# Patient Record
Sex: FEMALE | Race: WHITE | Employment: UNEMPLOYED
[De-identification: names, ages, dates, MRNs, and addresses within clinical notes are randomized per-mention and may not be internally consistent; named-entity substitution may affect disease eponyms.]

---

## 2017-01-16 ENCOUNTER — TELEPHONE (OUTPATIENT)
Dept: INTERNAL MEDICINE | Facility: CLINIC | Age: 47
End: 2017-01-16

## 2017-01-20 ENCOUNTER — TELEPHONE (OUTPATIENT)
Dept: INTERNAL MEDICINE | Facility: CLINIC | Age: 47
End: 2017-01-20

## 2017-01-20 DIAGNOSIS — R19.7 DIARRHEA, UNSPECIFIED TYPE: Primary | ICD-10-CM

## 2017-01-26 DIAGNOSIS — Z79.4 TYPE 2 DIABETES MELLITUS WITH COMPLICATION, WITH LONG-TERM CURRENT USE OF INSULIN (HCC): ICD-10-CM

## 2017-01-26 DIAGNOSIS — E11.8 TYPE 2 DIABETES MELLITUS WITH COMPLICATION, WITH LONG-TERM CURRENT USE OF INSULIN (HCC): ICD-10-CM

## 2017-01-26 DIAGNOSIS — E78.5 DYSLIPIDEMIA: Chronic | ICD-10-CM

## 2017-01-27 RX ORDER — ASPIRIN 81 MG
TABLET, DELAYED RELEASE (ENTERIC COATED) ORAL
Qty: 30 TABLET | Refills: 0 | Status: SHIPPED | OUTPATIENT
Start: 2017-01-27 | End: 2017-02-23 | Stop reason: SDUPTHER

## 2017-01-27 RX ORDER — LEVOTHYROXINE SODIUM 0.12 MG/1
TABLET ORAL
Qty: 30 TABLET | Refills: 0 | Status: SHIPPED | OUTPATIENT
Start: 2017-01-27 | End: 2017-02-23 | Stop reason: SDUPTHER

## 2017-01-27 RX ORDER — ATORVASTATIN CALCIUM 40 MG/1
TABLET, FILM COATED ORAL
Qty: 30 TABLET | Refills: 0 | Status: SHIPPED | OUTPATIENT
Start: 2017-01-27 | End: 2017-02-23 | Stop reason: SDUPTHER

## 2017-02-08 ENCOUNTER — OFFICE VISIT (OUTPATIENT)
Dept: INTERNAL MEDICINE | Facility: CLINIC | Age: 47
End: 2017-02-08

## 2017-02-08 VITALS
TEMPERATURE: 97.7 F | WEIGHT: 270 LBS | DIASTOLIC BLOOD PRESSURE: 67 MMHG | BODY MASS INDEX: 45.63 KG/M2 | HEART RATE: 78 BPM | SYSTOLIC BLOOD PRESSURE: 120 MMHG | RESPIRATION RATE: 18 BRPM

## 2017-02-08 DIAGNOSIS — Z79.4 TYPE 2 DIABETES MELLITUS WITH COMPLICATION, WITH LONG-TERM CURRENT USE OF INSULIN (HCC): Chronic | ICD-10-CM

## 2017-02-08 DIAGNOSIS — K43.9 VENTRAL HERNIA WITHOUT OBSTRUCTION OR GANGRENE: Primary | ICD-10-CM

## 2017-02-08 DIAGNOSIS — I10 ESSENTIAL HYPERTENSION: Chronic | ICD-10-CM

## 2017-02-08 DIAGNOSIS — E11.8 TYPE 2 DIABETES MELLITUS WITH COMPLICATION, WITH LONG-TERM CURRENT USE OF INSULIN (HCC): Chronic | ICD-10-CM

## 2017-02-08 PROCEDURE — 99213 OFFICE O/P EST LOW 20 MIN: CPT | Performed by: INTERNAL MEDICINE

## 2017-02-08 RX ORDER — CLONAZEPAM 0.5 MG/1
0.5 TABLET ORAL NIGHTLY PRN
COMMUNITY
Start: 2017-02-02 | End: 2018-10-29

## 2017-02-08 RX ORDER — LOPERAMIDE HYDROCHLORIDE 2 MG/1
CAPSULE ORAL
Refills: 0 | COMMUNITY
Start: 2017-01-21 | End: 2017-02-08

## 2017-02-08 RX ORDER — ONDANSETRON 4 MG/1
TABLET, ORALLY DISINTEGRATING ORAL
Refills: 0 | COMMUNITY
Start: 2017-01-21 | End: 2017-05-26 | Stop reason: ALTCHOICE

## 2017-02-08 RX ORDER — DULOXETIN HYDROCHLORIDE 60 MG/1
60 CAPSULE, DELAYED RELEASE ORAL DAILY
COMMUNITY
Start: 2017-01-26 | End: 2019-02-18 | Stop reason: ALTCHOICE

## 2017-02-08 RX ORDER — BIMATOPROST 0.01 %
1 DROPS OPHTHALMIC (EYE) NIGHTLY
COMMUNITY
Start: 2016-12-22 | End: 2022-08-02

## 2017-02-08 ASSESSMENT — ENCOUNTER SYMPTOMS
RESPIRATORY NEGATIVE: 1
EYES NEGATIVE: 1
BACK PAIN: 1
ABDOMINAL PAIN: 1

## 2017-02-09 ENCOUNTER — TELEPHONE (OUTPATIENT)
Dept: INTERNAL MEDICINE | Facility: CLINIC | Age: 47
End: 2017-02-09

## 2017-02-09 DIAGNOSIS — K43.9 VENTRAL HERNIA WITHOUT OBSTRUCTION OR GANGRENE: Primary | ICD-10-CM

## 2017-02-16 ENCOUNTER — TELEPHONE (OUTPATIENT)
Dept: INTERNAL MEDICINE | Facility: CLINIC | Age: 47
End: 2017-02-16

## 2017-02-17 RX ORDER — FLUCONAZOLE 150 MG/1
150 TABLET ORAL DAILY
Qty: 3 TABLET | Refills: 0 | Status: SHIPPED | OUTPATIENT
Start: 2017-02-17 | End: 2017-02-20

## 2017-02-23 DIAGNOSIS — Z79.4 TYPE 2 DIABETES MELLITUS WITH COMPLICATION, WITH LONG-TERM CURRENT USE OF INSULIN (HCC): ICD-10-CM

## 2017-02-23 DIAGNOSIS — E11.8 TYPE 2 DIABETES MELLITUS WITH COMPLICATION, WITH LONG-TERM CURRENT USE OF INSULIN (HCC): ICD-10-CM

## 2017-02-23 DIAGNOSIS — E78.5 DYSLIPIDEMIA: Chronic | ICD-10-CM

## 2017-02-23 RX ORDER — ATORVASTATIN CALCIUM 40 MG/1
TABLET, FILM COATED ORAL
Qty: 30 TABLET | Refills: 0 | Status: SHIPPED | OUTPATIENT
Start: 2017-02-23 | End: 2017-03-06 | Stop reason: SDUPTHER

## 2017-02-23 RX ORDER — LEVOTHYROXINE SODIUM 0.12 MG/1
TABLET ORAL
Qty: 30 TABLET | Refills: 0 | Status: SHIPPED | OUTPATIENT
Start: 2017-02-23 | End: 2017-03-06 | Stop reason: SDUPTHER

## 2017-02-23 RX ORDER — ASPIRIN 81 MG
TABLET, DELAYED RELEASE (ENTERIC COATED) ORAL
Qty: 30 TABLET | Refills: 0 | Status: SHIPPED | OUTPATIENT
Start: 2017-02-23 | End: 2017-03-06 | Stop reason: SDUPTHER

## 2017-03-03 DIAGNOSIS — Z79.4 TYPE 2 DIABETES MELLITUS WITH COMPLICATION, WITH LONG-TERM CURRENT USE OF INSULIN (HCC): ICD-10-CM

## 2017-03-03 DIAGNOSIS — E11.8 TYPE 2 DIABETES MELLITUS WITH COMPLICATION, WITH LONG-TERM CURRENT USE OF INSULIN (HCC): ICD-10-CM

## 2017-03-06 DIAGNOSIS — E08.49 OTHER DIABETIC NEUROLOGICAL COMPLICATION ASSOCIATED WITH DIABETES MELLITUS DUE TO UNDERLYING CONDITION (HCC): ICD-10-CM

## 2017-03-06 DIAGNOSIS — E78.5 DYSLIPIDEMIA: Chronic | ICD-10-CM

## 2017-03-06 DIAGNOSIS — Z79.4 TYPE 2 DIABETES MELLITUS WITH COMPLICATION, WITH LONG-TERM CURRENT USE OF INSULIN (HCC): ICD-10-CM

## 2017-03-06 DIAGNOSIS — E11.8 TYPE 2 DIABETES MELLITUS WITH COMPLICATION, WITH LONG-TERM CURRENT USE OF INSULIN (HCC): ICD-10-CM

## 2017-03-07 RX ORDER — ASPIRIN 81 MG
TABLET, DELAYED RELEASE (ENTERIC COATED) ORAL
Qty: 30 TABLET | Refills: 0 | Status: SHIPPED | OUTPATIENT
Start: 2017-03-07 | End: 2017-04-21 | Stop reason: SDUPTHER

## 2017-03-07 RX ORDER — GABAPENTIN 300 MG/1
CAPSULE ORAL
Qty: 90 CAPSULE | Refills: 0 | Status: SHIPPED | OUTPATIENT
Start: 2017-03-07 | End: 2017-04-21 | Stop reason: SDUPTHER

## 2017-03-07 RX ORDER — LEVOTHYROXINE SODIUM 0.12 MG/1
TABLET ORAL
Qty: 30 TABLET | Refills: 0 | Status: SHIPPED | OUTPATIENT
Start: 2017-03-07 | End: 2017-04-21 | Stop reason: SDUPTHER

## 2017-03-07 RX ORDER — ATORVASTATIN CALCIUM 40 MG/1
TABLET, FILM COATED ORAL
Qty: 30 TABLET | Refills: 0 | Status: SHIPPED | OUTPATIENT
Start: 2017-03-07 | End: 2017-04-21 | Stop reason: SDUPTHER

## 2017-04-03 DIAGNOSIS — Z79.4 TYPE 2 DIABETES MELLITUS WITH COMPLICATION, WITH LONG-TERM CURRENT USE OF INSULIN (HCC): Primary | Chronic | ICD-10-CM

## 2017-04-03 DIAGNOSIS — E11.8 TYPE 2 DIABETES MELLITUS WITH COMPLICATION, WITH LONG-TERM CURRENT USE OF INSULIN (HCC): Primary | Chronic | ICD-10-CM

## 2017-04-04 ENCOUNTER — HOSPITAL ENCOUNTER (OUTPATIENT)
Dept: PREADMISSION TESTING | Age: 47
Discharge: HOME | End: 2017-04-04
Payer: COMMERCIAL

## 2017-04-04 ENCOUNTER — HOSPITAL ENCOUNTER (OUTPATIENT)
Dept: GENERAL RADIOLOGY | Age: 47
Discharge: HOME | End: 2017-04-04
Payer: COMMERCIAL

## 2017-04-04 VITALS
HEART RATE: 90 BPM | RESPIRATION RATE: 18 BRPM | SYSTOLIC BLOOD PRESSURE: 147 MMHG | BODY MASS INDEX: 43.62 KG/M2 | WEIGHT: 271.39 LBS | DIASTOLIC BLOOD PRESSURE: 88 MMHG | OXYGEN SATURATION: 95 % | HEIGHT: 66 IN

## 2017-04-04 LAB
ABSOLUTE EOS #: 0.3 K/UL (ref 0–0.4)
ABSOLUTE LYMPH #: 3.5 K/UL (ref 1–4.8)
ABSOLUTE MONO #: 0.4 K/UL (ref 0.2–0.8)
ANION GAP SERPL CALCULATED.3IONS-SCNC: 16 MMOL/L (ref 9–17)
BASOPHILS # BLD: 1 % (ref 0–2)
BASOPHILS ABSOLUTE: 0 K/UL (ref 0–0.2)
BUN BLDV-MCNC: 12 MG/DL (ref 6–20)
BUN/CREAT BLD: 17 (ref 9–20)
CALCIUM SERPL-MCNC: 8.7 MG/DL (ref 8.6–10.4)
CHLORIDE BLD-SCNC: 86 MMOL/L (ref 98–107)
CO2: 26 MMOL/L (ref 20–31)
CREAT SERPL-MCNC: 0.72 MG/DL (ref 0.5–0.9)
DIFFERENTIAL TYPE: ABNORMAL
EOSINOPHILS RELATIVE PERCENT: 3 % (ref 1–4)
GFR AFRICAN AMERICAN: >60 ML/MIN
GFR NON-AFRICAN AMERICAN: >60 ML/MIN
GFR SERPL CREATININE-BSD FRML MDRD: ABNORMAL ML/MIN/{1.73_M2}
GFR SERPL CREATININE-BSD FRML MDRD: ABNORMAL ML/MIN/{1.73_M2}
GLUCOSE BLD-MCNC: 515 MG/DL (ref 70–99)
HCT VFR BLD CALC: 38.6 % (ref 36–46)
HEMOGLOBIN: 12.7 G/DL (ref 12–16)
LYMPHOCYTES # BLD: 36 % (ref 24–44)
MCH RBC QN AUTO: 25.5 PG (ref 26–34)
MCHC RBC AUTO-ENTMCNC: 32.8 G/DL (ref 31–37)
MCV RBC AUTO: 77.7 FL (ref 80–100)
MONOCYTES # BLD: 4 % (ref 1–7)
PDW BLD-RTO: 15.4 % (ref 11.5–14.5)
PLATELET # BLD: 343 K/UL (ref 130–400)
PLATELET ESTIMATE: ABNORMAL
PMV BLD AUTO: 8.6 FL (ref 6–12)
POTASSIUM SERPL-SCNC: 4.6 MMOL/L (ref 3.7–5.3)
RBC # BLD: 4.97 M/UL (ref 4–5.2)
RBC # BLD: ABNORMAL 10*6/UL
SEG NEUTROPHILS: 56 % (ref 36–66)
SEGMENTED NEUTROPHILS ABSOLUTE COUNT: 5.4 K/UL (ref 1.8–7.7)
SODIUM BLD-SCNC: 128 MMOL/L (ref 135–144)
WBC # BLD: 9.7 K/UL (ref 3.5–11)
WBC # BLD: ABNORMAL 10*3/UL

## 2017-04-04 PROCEDURE — 80048 BASIC METABOLIC PNL TOTAL CA: CPT

## 2017-04-04 PROCEDURE — 85025 COMPLETE CBC W/AUTO DIFF WBC: CPT

## 2017-04-04 PROCEDURE — 71020 XR CHEST STANDARD TWO VW: CPT

## 2017-04-04 PROCEDURE — 36415 COLL VENOUS BLD VENIPUNCTURE: CPT

## 2017-04-04 PROCEDURE — 93005 ELECTROCARDIOGRAM TRACING: CPT

## 2017-04-05 ENCOUNTER — OFFICE VISIT (OUTPATIENT)
Dept: INTERNAL MEDICINE | Age: 47
End: 2017-04-05
Payer: COMMERCIAL

## 2017-04-05 VITALS
HEIGHT: 65 IN | WEIGHT: 275.4 LBS | BODY MASS INDEX: 45.88 KG/M2 | SYSTOLIC BLOOD PRESSURE: 105 MMHG | DIASTOLIC BLOOD PRESSURE: 68 MMHG | HEART RATE: 85 BPM

## 2017-04-05 DIAGNOSIS — I10 ESSENTIAL HYPERTENSION: Chronic | ICD-10-CM

## 2017-04-05 DIAGNOSIS — E11.8 TYPE 2 DIABETES MELLITUS WITH COMPLICATION, WITH LONG-TERM CURRENT USE OF INSULIN (HCC): Primary | Chronic | ICD-10-CM

## 2017-04-05 DIAGNOSIS — Z79.4 TYPE 2 DIABETES MELLITUS WITH COMPLICATION, WITH LONG-TERM CURRENT USE OF INSULIN (HCC): Primary | Chronic | ICD-10-CM

## 2017-04-05 LAB
EKG ATRIAL RATE: 83 BPM
EKG P AXIS: 40 DEGREES
EKG P-R INTERVAL: 172 MS
EKG Q-T INTERVAL: 382 MS
EKG QRS DURATION: 68 MS
EKG QTC CALCULATION (BAZETT): 448 MS
EKG R AXIS: 8 DEGREES
EKG T AXIS: 76 DEGREES
EKG VENTRICULAR RATE: 83 BPM
GLUCOSE BLD-MCNC: 315 MG/DL
HBA1C MFR BLD: 13 %

## 2017-04-05 PROCEDURE — 83036 HEMOGLOBIN GLYCOSYLATED A1C: CPT | Performed by: INTERNAL MEDICINE

## 2017-04-05 PROCEDURE — 99214 OFFICE O/P EST MOD 30 MIN: CPT | Performed by: INTERNAL MEDICINE

## 2017-04-05 PROCEDURE — 82962 GLUCOSE BLOOD TEST: CPT | Performed by: INTERNAL MEDICINE

## 2017-04-05 RX ORDER — TEMAZEPAM 15 MG/1
15 CAPSULE ORAL NIGHTLY PRN
Qty: 15 CAPSULE | Refills: 0 | Status: SHIPPED | OUTPATIENT
Start: 2017-04-05 | End: 2018-10-29

## 2017-04-05 RX ORDER — CYCLOBENZAPRINE HCL 5 MG
5 TABLET ORAL 2 TIMES DAILY PRN
Qty: 60 TABLET | Refills: 2 | Status: SHIPPED | OUTPATIENT
Start: 2017-04-05 | End: 2017-07-05 | Stop reason: SDUPTHER

## 2017-04-05 ASSESSMENT — ENCOUNTER SYMPTOMS
EYES NEGATIVE: 1
RESPIRATORY NEGATIVE: 1
ALLERGIC/IMMUNOLOGIC NEGATIVE: 1
GASTROINTESTINAL NEGATIVE: 1

## 2017-04-06 ENCOUNTER — TELEPHONE (OUTPATIENT)
Dept: INTERNAL MEDICINE | Age: 47
End: 2017-04-06

## 2017-04-12 ENCOUNTER — OFFICE VISIT (OUTPATIENT)
Dept: INTERNAL MEDICINE | Age: 47
End: 2017-04-12
Payer: COMMERCIAL

## 2017-04-12 VITALS
WEIGHT: 270 LBS | HEIGHT: 64 IN | BODY MASS INDEX: 46.1 KG/M2 | SYSTOLIC BLOOD PRESSURE: 104 MMHG | DIASTOLIC BLOOD PRESSURE: 68 MMHG | HEART RATE: 77 BPM

## 2017-04-12 DIAGNOSIS — E11.8 TYPE 2 DIABETES MELLITUS WITH COMPLICATION, WITH LONG-TERM CURRENT USE OF INSULIN (HCC): Primary | Chronic | ICD-10-CM

## 2017-04-12 DIAGNOSIS — Z01.818 PREOPERATIVE CLEARANCE: ICD-10-CM

## 2017-04-12 DIAGNOSIS — Z79.4 TYPE 2 DIABETES MELLITUS WITH COMPLICATION, WITH LONG-TERM CURRENT USE OF INSULIN (HCC): Primary | Chronic | ICD-10-CM

## 2017-04-12 LAB — GLUCOSE BLD-MCNC: 163 MG/DL

## 2017-04-12 PROCEDURE — 99213 OFFICE O/P EST LOW 20 MIN: CPT | Performed by: INTERNAL MEDICINE

## 2017-04-12 PROCEDURE — 82962 GLUCOSE BLOOD TEST: CPT | Performed by: INTERNAL MEDICINE

## 2017-04-15 DIAGNOSIS — J41.0 SIMPLE CHRONIC BRONCHITIS (HCC): ICD-10-CM

## 2017-04-17 ENCOUNTER — TELEPHONE (OUTPATIENT)
Dept: INTERNAL MEDICINE | Age: 47
End: 2017-04-17

## 2017-04-17 RX ORDER — FLUCONAZOLE 150 MG/1
150 TABLET ORAL DAILY
Qty: 3 TABLET | Refills: 0 | Status: SHIPPED | OUTPATIENT
Start: 2017-04-17 | End: 2017-04-20

## 2017-04-17 RX ORDER — TIOTROPIUM BROMIDE 18 UG/1
CAPSULE ORAL; RESPIRATORY (INHALATION)
Qty: 30 CAPSULE | Refills: 0 | Status: SHIPPED | OUTPATIENT
Start: 2017-04-17 | End: 2017-04-28 | Stop reason: SDUPTHER

## 2017-04-17 RX ORDER — KETOCONAZOLE 20 MG/G
CREAM TOPICAL
Qty: 30 G | Refills: 1 | Status: SHIPPED | OUTPATIENT
Start: 2017-04-17 | End: 2018-08-20

## 2017-04-18 ENCOUNTER — HOSPITAL ENCOUNTER (OUTPATIENT)
Age: 47
Setting detail: OUTPATIENT SURGERY
Discharge: HOME | End: 2017-04-18
Attending: SURGERY | Admitting: SURGERY
Payer: COMMERCIAL

## 2017-04-18 VITALS
TEMPERATURE: 98 F | DIASTOLIC BLOOD PRESSURE: 83 MMHG | RESPIRATION RATE: 16 BRPM | HEIGHT: 66 IN | HEART RATE: 73 BPM | OXYGEN SATURATION: 94 % | BODY MASS INDEX: 43.54 KG/M2 | SYSTOLIC BLOOD PRESSURE: 145 MMHG | WEIGHT: 270.9 LBS

## 2017-04-18 LAB — HCG, PREGNANCY URINE (POC): NEGATIVE

## 2017-04-18 PROCEDURE — 84703 CHORIONIC GONADOTROPIN ASSAY: CPT

## 2017-04-18 RX ORDER — SODIUM CHLORIDE, SODIUM LACTATE, POTASSIUM CHLORIDE, CALCIUM CHLORIDE 600; 310; 30; 20 MG/100ML; MG/100ML; MG/100ML; MG/100ML
INJECTION, SOLUTION INTRAVENOUS CONTINUOUS
Status: DISCONTINUED | OUTPATIENT
Start: 2017-04-19 | End: 2017-04-18 | Stop reason: HOSPADM

## 2017-04-18 RX ORDER — LIDOCAINE HYDROCHLORIDE 10 MG/ML
1 INJECTION, SOLUTION EPIDURAL; INFILTRATION; INTRACAUDAL; PERINEURAL
Status: DISCONTINUED | OUTPATIENT
Start: 2017-04-19 | End: 2017-04-18 | Stop reason: HOSPADM

## 2017-04-18 RX ORDER — SODIUM CHLORIDE 9 MG/ML
INJECTION, SOLUTION INTRAVENOUS CONTINUOUS
Status: DISCONTINUED | OUTPATIENT
Start: 2017-04-19 | End: 2017-04-18

## 2017-04-18 RX ORDER — SODIUM CHLORIDE 0.9 % (FLUSH) 0.9 %
10 SYRINGE (ML) INJECTION PRN
Status: DISCONTINUED | OUTPATIENT
Start: 2017-04-18 | End: 2017-04-18 | Stop reason: HOSPADM

## 2017-04-18 RX ORDER — SODIUM CHLORIDE 0.9 % (FLUSH) 0.9 %
10 SYRINGE (ML) INJECTION EVERY 12 HOURS SCHEDULED
Status: DISCONTINUED | OUTPATIENT
Start: 2017-04-18 | End: 2017-04-18 | Stop reason: HOSPADM

## 2017-04-18 ASSESSMENT — PAIN - FUNCTIONAL ASSESSMENT: PAIN_FUNCTIONAL_ASSESSMENT: 0-10

## 2017-04-21 DIAGNOSIS — I10 ESSENTIAL HYPERTENSION: Chronic | ICD-10-CM

## 2017-04-21 DIAGNOSIS — E78.5 DYSLIPIDEMIA: Chronic | ICD-10-CM

## 2017-04-21 DIAGNOSIS — E11.8 TYPE 2 DIABETES MELLITUS WITH COMPLICATION, WITH LONG-TERM CURRENT USE OF INSULIN (HCC): ICD-10-CM

## 2017-04-21 DIAGNOSIS — E08.49 OTHER DIABETIC NEUROLOGICAL COMPLICATION ASSOCIATED WITH DIABETES MELLITUS DUE TO UNDERLYING CONDITION (HCC): ICD-10-CM

## 2017-04-21 DIAGNOSIS — Z79.4 TYPE 2 DIABETES MELLITUS WITH COMPLICATION, WITH LONG-TERM CURRENT USE OF INSULIN (HCC): ICD-10-CM

## 2017-04-22 RX ORDER — ASPIRIN 81 MG
TABLET, DELAYED RELEASE (ENTERIC COATED) ORAL
Qty: 30 TABLET | Refills: 0 | Status: SHIPPED | OUTPATIENT
Start: 2017-04-22 | End: 2017-05-17 | Stop reason: SDUPTHER

## 2017-04-22 RX ORDER — LEVOTHYROXINE SODIUM 0.12 MG/1
TABLET ORAL
Qty: 30 TABLET | Refills: 0 | Status: SHIPPED | OUTPATIENT
Start: 2017-04-22 | End: 2017-05-17 | Stop reason: SDUPTHER

## 2017-04-22 RX ORDER — ATORVASTATIN CALCIUM 40 MG/1
TABLET, FILM COATED ORAL
Qty: 30 TABLET | Refills: 0 | Status: SHIPPED | OUTPATIENT
Start: 2017-04-22 | End: 2017-05-17 | Stop reason: SDUPTHER

## 2017-04-22 RX ORDER — GABAPENTIN 300 MG/1
CAPSULE ORAL
Qty: 90 CAPSULE | Refills: 0 | Status: SHIPPED | OUTPATIENT
Start: 2017-04-22 | End: 2017-05-17 | Stop reason: SDUPTHER

## 2017-04-24 RX ORDER — ATENOLOL 50 MG/1
TABLET ORAL
Qty: 30 TABLET | Refills: 0 | Status: SHIPPED | OUTPATIENT
Start: 2017-04-24 | End: 2017-05-17 | Stop reason: SDUPTHER

## 2017-04-28 DIAGNOSIS — J41.0 SIMPLE CHRONIC BRONCHITIS (HCC): ICD-10-CM

## 2017-04-28 RX ORDER — TIOTROPIUM BROMIDE 18 UG/1
CAPSULE ORAL; RESPIRATORY (INHALATION)
Qty: 30 CAPSULE | Refills: 0 | Status: SHIPPED | OUTPATIENT
Start: 2017-04-28 | End: 2017-07-03 | Stop reason: SDUPTHER

## 2017-05-01 ENCOUNTER — ANESTHESIA EVENT (OUTPATIENT)
Dept: OPERATING ROOM | Age: 47
End: 2017-05-01
Payer: COMMERCIAL

## 2017-05-01 RX ORDER — SODIUM CHLORIDE 9 MG/ML
INJECTION, SOLUTION INTRAVENOUS CONTINUOUS
Status: CANCELLED | OUTPATIENT
Start: 2017-05-01

## 2017-05-01 RX ORDER — LIDOCAINE HYDROCHLORIDE 10 MG/ML
1 INJECTION, SOLUTION EPIDURAL; INFILTRATION; INTRACAUDAL; PERINEURAL
Status: CANCELLED | OUTPATIENT
Start: 2017-05-01 | End: 2017-05-01

## 2017-05-01 RX ORDER — SODIUM CHLORIDE 0.9 % (FLUSH) 0.9 %
10 SYRINGE (ML) INJECTION EVERY 12 HOURS SCHEDULED
Status: CANCELLED | OUTPATIENT
Start: 2017-05-01

## 2017-05-01 RX ORDER — SODIUM CHLORIDE, SODIUM LACTATE, POTASSIUM CHLORIDE, CALCIUM CHLORIDE 600; 310; 30; 20 MG/100ML; MG/100ML; MG/100ML; MG/100ML
INJECTION, SOLUTION INTRAVENOUS CONTINUOUS
Status: CANCELLED | OUTPATIENT
Start: 2017-05-01

## 2017-05-01 RX ORDER — SODIUM CHLORIDE 0.9 % (FLUSH) 0.9 %
10 SYRINGE (ML) INJECTION PRN
Status: CANCELLED | OUTPATIENT
Start: 2017-05-01

## 2017-05-02 ENCOUNTER — TELEPHONE (OUTPATIENT)
Dept: INTERNAL MEDICINE | Age: 47
End: 2017-05-02

## 2017-05-02 ENCOUNTER — HOSPITAL ENCOUNTER (OUTPATIENT)
Age: 47
Setting detail: OBSERVATION
Discharge: HOME | End: 2017-05-03
Attending: SURGERY | Admitting: SURGERY
Payer: COMMERCIAL

## 2017-05-02 ENCOUNTER — ANESTHESIA (OUTPATIENT)
Dept: OPERATING ROOM | Age: 47
End: 2017-05-02
Payer: COMMERCIAL

## 2017-05-02 VITALS — SYSTOLIC BLOOD PRESSURE: 122 MMHG | TEMPERATURE: 97.9 F | DIASTOLIC BLOOD PRESSURE: 61 MMHG | OXYGEN SATURATION: 92 %

## 2017-05-02 LAB
CREAT SERPL-MCNC: 0.6 MG/DL (ref 0.5–0.9)
GFR AFRICAN AMERICAN: >60 ML/MIN
GFR NON-AFRICAN AMERICAN: >60 ML/MIN
GFR SERPL CREATININE-BSD FRML MDRD: NORMAL ML/MIN/{1.73_M2}
GFR SERPL CREATININE-BSD FRML MDRD: NORMAL ML/MIN/{1.73_M2}
GLUCOSE BLD-MCNC: 203 MG/DL (ref 65–105)
GLUCOSE BLD-MCNC: 214 MG/DL (ref 65–105)
GLUCOSE BLD-MCNC: 335 MG/DL (ref 65–105)
HCG, PREGNANCY URINE (POC): NEGATIVE

## 2017-05-02 PROCEDURE — G0378 HOSPITAL OBSERVATION PER HR: HCPCS

## 2017-05-02 PROCEDURE — S2900 ROBOTIC SURGICAL SYSTEM: HCPCS | Performed by: SURGERY

## 2017-05-02 PROCEDURE — 3600000009 HC SURGERY ROBOT BASE: Performed by: SURGERY

## 2017-05-02 PROCEDURE — 6360000002 HC RX W HCPCS: Performed by: ANESTHESIOLOGY

## 2017-05-02 PROCEDURE — 82565 ASSAY OF CREATININE: CPT

## 2017-05-02 PROCEDURE — 2500000003 HC RX 250 WO HCPCS: Performed by: NURSE ANESTHETIST, CERTIFIED REGISTERED

## 2017-05-02 PROCEDURE — 6370000000 HC RX 637 (ALT 250 FOR IP): Performed by: ANESTHESIOLOGY

## 2017-05-02 PROCEDURE — 2500000003 HC RX 250 WO HCPCS: Performed by: SURGERY

## 2017-05-02 PROCEDURE — 3700000001 HC ADD 15 MINUTES (ANESTHESIA): Performed by: SURGERY

## 2017-05-02 PROCEDURE — 7100000000 HC PACU RECOVERY - FIRST 15 MIN: Performed by: SURGERY

## 2017-05-02 PROCEDURE — 2580000003 HC RX 258: Performed by: ANESTHESIOLOGY

## 2017-05-02 PROCEDURE — 6360000002 HC RX W HCPCS: Performed by: SURGERY

## 2017-05-02 PROCEDURE — 6360000002 HC RX W HCPCS: Performed by: NURSE ANESTHETIST, CERTIFIED REGISTERED

## 2017-05-02 PROCEDURE — 6370000000 HC RX 637 (ALT 250 FOR IP): Performed by: SURGERY

## 2017-05-02 PROCEDURE — 82947 ASSAY GLUCOSE BLOOD QUANT: CPT

## 2017-05-02 PROCEDURE — 2500000003 HC RX 250 WO HCPCS: Performed by: ANESTHESIOLOGY

## 2017-05-02 PROCEDURE — C1760 CLOSURE DEV, VASC: HCPCS | Performed by: SURGERY

## 2017-05-02 PROCEDURE — 84703 CHORIONIC GONADOTROPIN ASSAY: CPT

## 2017-05-02 PROCEDURE — 2580000003 HC RX 258: Performed by: SURGERY

## 2017-05-02 PROCEDURE — 7100000001 HC PACU RECOVERY - ADDTL 15 MIN: Performed by: SURGERY

## 2017-05-02 PROCEDURE — 3600000019 HC SURGERY ROBOT ADDTL 15MIN: Performed by: SURGERY

## 2017-05-02 PROCEDURE — 36415 COLL VENOUS BLD VENIPUNCTURE: CPT

## 2017-05-02 PROCEDURE — 3700000000 HC ANESTHESIA ATTENDED CARE: Performed by: SURGERY

## 2017-05-02 RX ORDER — DEXAMETHASONE SODIUM PHOSPHATE 10 MG/ML
INJECTION INTRAMUSCULAR; INTRAVENOUS PRN
Status: DISCONTINUED | OUTPATIENT
Start: 2017-05-02 | End: 2017-05-02 | Stop reason: SDUPTHER

## 2017-05-02 RX ORDER — SODIUM CHLORIDE 0.9 % (FLUSH) 0.9 %
10 SYRINGE (ML) INJECTION PRN
Status: DISCONTINUED | OUTPATIENT
Start: 2017-05-02 | End: 2017-05-02 | Stop reason: HOSPADM

## 2017-05-02 RX ORDER — LABETALOL HYDROCHLORIDE 5 MG/ML
5 INJECTION, SOLUTION INTRAVENOUS EVERY 10 MIN PRN
Status: DISCONTINUED | OUTPATIENT
Start: 2017-05-02 | End: 2017-05-02 | Stop reason: HOSPADM

## 2017-05-02 RX ORDER — FENTANYL CITRATE 50 UG/ML
25 INJECTION, SOLUTION INTRAMUSCULAR; INTRAVENOUS EVERY 5 MIN PRN
Status: DISCONTINUED | OUTPATIENT
Start: 2017-05-02 | End: 2017-05-02 | Stop reason: HOSPADM

## 2017-05-02 RX ORDER — OXYCODONE HYDROCHLORIDE AND ACETAMINOPHEN 5; 325 MG/1; MG/1
1 TABLET ORAL EVERY 4 HOURS PRN
Status: DISCONTINUED | OUTPATIENT
Start: 2017-05-02 | End: 2017-05-03 | Stop reason: HOSPADM

## 2017-05-02 RX ORDER — NITROGLYCERIN 0.4 MG/1
0.4 TABLET SUBLINGUAL EVERY 5 MIN PRN
Status: DISCONTINUED | OUTPATIENT
Start: 2017-05-02 | End: 2017-05-03 | Stop reason: HOSPADM

## 2017-05-02 RX ORDER — ONDANSETRON 4 MG/1
4 TABLET, ORALLY DISINTEGRATING ORAL EVERY 8 HOURS PRN
Status: DISCONTINUED | OUTPATIENT
Start: 2017-05-02 | End: 2017-05-03 | Stop reason: HOSPADM

## 2017-05-02 RX ORDER — SODIUM CHLORIDE 0.9 % (FLUSH) 0.9 %
10 SYRINGE (ML) INJECTION EVERY 12 HOURS SCHEDULED
Status: DISCONTINUED | OUTPATIENT
Start: 2017-05-02 | End: 2017-05-03 | Stop reason: HOSPADM

## 2017-05-02 RX ORDER — FENTANYL CITRATE 50 UG/ML
INJECTION, SOLUTION INTRAMUSCULAR; INTRAVENOUS PRN
Status: DISCONTINUED | OUTPATIENT
Start: 2017-05-02 | End: 2017-05-02 | Stop reason: SDUPTHER

## 2017-05-02 RX ORDER — POTASSIUM CHLORIDE 750 MG/1
10 CAPSULE, EXTENDED RELEASE ORAL DAILY
Status: DISCONTINUED | OUTPATIENT
Start: 2017-05-03 | End: 2017-05-03 | Stop reason: HOSPADM

## 2017-05-02 RX ORDER — ACETAMINOPHEN 325 MG/1
650 TABLET ORAL EVERY 4 HOURS PRN
Status: DISCONTINUED | OUTPATIENT
Start: 2017-05-02 | End: 2017-05-03 | Stop reason: HOSPADM

## 2017-05-02 RX ORDER — LIDOCAINE HYDROCHLORIDE 10 MG/ML
1 INJECTION, SOLUTION EPIDURAL; INFILTRATION; INTRACAUDAL; PERINEURAL
Status: DISCONTINUED | OUTPATIENT
Start: 2017-05-02 | End: 2017-05-02 | Stop reason: HOSPADM

## 2017-05-02 RX ORDER — NICOTINE POLACRILEX 4 MG
15 LOZENGE BUCCAL PRN
Status: DISCONTINUED | OUTPATIENT
Start: 2017-05-02 | End: 2017-05-03 | Stop reason: HOSPADM

## 2017-05-02 RX ORDER — GLYCOPYRROLATE 0.2 MG/ML
INJECTION INTRAMUSCULAR; INTRAVENOUS PRN
Status: DISCONTINUED | OUTPATIENT
Start: 2017-05-02 | End: 2017-05-02 | Stop reason: SDUPTHER

## 2017-05-02 RX ORDER — MIDAZOLAM HYDROCHLORIDE 1 MG/ML
INJECTION INTRAMUSCULAR; INTRAVENOUS PRN
Status: DISCONTINUED | OUTPATIENT
Start: 2017-05-02 | End: 2017-05-02 | Stop reason: SDUPTHER

## 2017-05-02 RX ORDER — ASPIRIN 81 MG/1
81 TABLET ORAL DAILY
Status: DISCONTINUED | OUTPATIENT
Start: 2017-05-03 | End: 2017-05-03 | Stop reason: HOSPADM

## 2017-05-02 RX ORDER — PANTOPRAZOLE SODIUM 40 MG/1
40 TABLET, DELAYED RELEASE ORAL
Status: DISCONTINUED | OUTPATIENT
Start: 2017-05-03 | End: 2017-05-03 | Stop reason: HOSPADM

## 2017-05-02 RX ORDER — CYCLOBENZAPRINE HCL 5 MG
5 TABLET ORAL 2 TIMES DAILY PRN
Status: DISCONTINUED | OUTPATIENT
Start: 2017-05-02 | End: 2017-05-03 | Stop reason: HOSPADM

## 2017-05-02 RX ORDER — PROPOFOL 10 MG/ML
INJECTION, EMULSION INTRAVENOUS PRN
Status: DISCONTINUED | OUTPATIENT
Start: 2017-05-02 | End: 2017-05-02 | Stop reason: SDUPTHER

## 2017-05-02 RX ORDER — ONDANSETRON 2 MG/ML
4 INJECTION INTRAMUSCULAR; INTRAVENOUS
Status: DISCONTINUED | OUTPATIENT
Start: 2017-05-02 | End: 2017-05-02 | Stop reason: HOSPADM

## 2017-05-02 RX ORDER — ALBUTEROL SULFATE 2.5 MG/3ML
2.5 SOLUTION RESPIRATORY (INHALATION) EVERY 6 HOURS PRN
Status: DISCONTINUED | OUTPATIENT
Start: 2017-05-02 | End: 2017-05-03 | Stop reason: HOSPADM

## 2017-05-02 RX ORDER — ATENOLOL 50 MG/1
1 TABLET ORAL DAILY
Status: DISCONTINUED | OUTPATIENT
Start: 2017-05-03 | End: 2017-05-02 | Stop reason: SDUPTHER

## 2017-05-02 RX ORDER — ONDANSETRON 2 MG/ML
4 INJECTION INTRAMUSCULAR; INTRAVENOUS EVERY 6 HOURS PRN
Status: DISCONTINUED | OUTPATIENT
Start: 2017-05-02 | End: 2017-05-03 | Stop reason: HOSPADM

## 2017-05-02 RX ORDER — DEXTROSE MONOHYDRATE 25 G/50ML
12.5 INJECTION, SOLUTION INTRAVENOUS PRN
Status: DISCONTINUED | OUTPATIENT
Start: 2017-05-02 | End: 2017-05-03 | Stop reason: HOSPADM

## 2017-05-02 RX ORDER — FENTANYL CITRATE 50 UG/ML
25 INJECTION, SOLUTION INTRAMUSCULAR; INTRAVENOUS EVERY 5 MIN PRN
Status: COMPLETED | OUTPATIENT
Start: 2017-05-02 | End: 2017-05-02

## 2017-05-02 RX ORDER — HYDRALAZINE HYDROCHLORIDE 20 MG/ML
5 INJECTION INTRAMUSCULAR; INTRAVENOUS EVERY 10 MIN PRN
Status: DISCONTINUED | OUTPATIENT
Start: 2017-05-02 | End: 2017-05-02 | Stop reason: HOSPADM

## 2017-05-02 RX ORDER — BUDESONIDE AND FORMOTEROL FUMARATE DIHYDRATE 160; 4.5 UG/1; UG/1
2 AEROSOL RESPIRATORY (INHALATION) DAILY
Status: DISCONTINUED | OUTPATIENT
Start: 2017-05-03 | End: 2017-05-03 | Stop reason: HOSPADM

## 2017-05-02 RX ORDER — ATORVASTATIN CALCIUM 40 MG/1
40 TABLET, FILM COATED ORAL DAILY
Status: DISCONTINUED | OUTPATIENT
Start: 2017-05-03 | End: 2017-05-03 | Stop reason: HOSPADM

## 2017-05-02 RX ORDER — SERTRALINE HYDROCHLORIDE 100 MG/1
100 TABLET, FILM COATED ORAL NIGHTLY
Status: DISCONTINUED | OUTPATIENT
Start: 2017-05-02 | End: 2017-05-03 | Stop reason: HOSPADM

## 2017-05-02 RX ORDER — GABAPENTIN 300 MG/1
300 CAPSULE ORAL 3 TIMES DAILY
Status: DISCONTINUED | OUTPATIENT
Start: 2017-05-02 | End: 2017-05-03 | Stop reason: HOSPADM

## 2017-05-02 RX ORDER — LIDOCAINE HYDROCHLORIDE 20 MG/ML
INJECTION, SOLUTION EPIDURAL; INFILTRATION; INTRACAUDAL; PERINEURAL PRN
Status: DISCONTINUED | OUTPATIENT
Start: 2017-05-02 | End: 2017-05-02 | Stop reason: SDUPTHER

## 2017-05-02 RX ORDER — SODIUM CHLORIDE 0.9 % (FLUSH) 0.9 %
10 SYRINGE (ML) INJECTION PRN
Status: DISCONTINUED | OUTPATIENT
Start: 2017-05-02 | End: 2017-05-03 | Stop reason: HOSPADM

## 2017-05-02 RX ORDER — PROMETHAZINE HYDROCHLORIDE 25 MG/ML
6.25 INJECTION, SOLUTION INTRAMUSCULAR; INTRAVENOUS
Status: DISCONTINUED | OUTPATIENT
Start: 2017-05-02 | End: 2017-05-02 | Stop reason: HOSPADM

## 2017-05-02 RX ORDER — DEXTROSE MONOHYDRATE 50 MG/ML
100 INJECTION, SOLUTION INTRAVENOUS PRN
Status: DISCONTINUED | OUTPATIENT
Start: 2017-05-02 | End: 2017-05-03 | Stop reason: HOSPADM

## 2017-05-02 RX ORDER — MELOXICAM 7.5 MG/1
15 TABLET ORAL DAILY
Status: DISCONTINUED | OUTPATIENT
Start: 2017-05-03 | End: 2017-05-03 | Stop reason: HOSPADM

## 2017-05-02 RX ORDER — SODIUM CHLORIDE, SODIUM LACTATE, POTASSIUM CHLORIDE, CALCIUM CHLORIDE 600; 310; 30; 20 MG/100ML; MG/100ML; MG/100ML; MG/100ML
INJECTION, SOLUTION INTRAVENOUS CONTINUOUS
Status: DISCONTINUED | OUTPATIENT
Start: 2017-05-02 | End: 2017-05-02

## 2017-05-02 RX ORDER — FUROSEMIDE 20 MG/1
20 TABLET ORAL DAILY
Status: DISCONTINUED | OUTPATIENT
Start: 2017-05-03 | End: 2017-05-03 | Stop reason: HOSPADM

## 2017-05-02 RX ORDER — BUPIVACAINE HYDROCHLORIDE AND EPINEPHRINE 5; 5 MG/ML; UG/ML
INJECTION, SOLUTION EPIDURAL; INTRACAUDAL; PERINEURAL PRN
Status: DISCONTINUED | OUTPATIENT
Start: 2017-05-02 | End: 2017-05-02 | Stop reason: HOSPADM

## 2017-05-02 RX ORDER — ONDANSETRON 2 MG/ML
INJECTION INTRAMUSCULAR; INTRAVENOUS PRN
Status: DISCONTINUED | OUTPATIENT
Start: 2017-05-02 | End: 2017-05-02 | Stop reason: SDUPTHER

## 2017-05-02 RX ORDER — LIDOCAINE HYDROCHLORIDE AND EPINEPHRINE 10; 10 MG/ML; UG/ML
INJECTION, SOLUTION INFILTRATION; PERINEURAL PRN
Status: DISCONTINUED | OUTPATIENT
Start: 2017-05-02 | End: 2017-05-02 | Stop reason: HOSPADM

## 2017-05-02 RX ORDER — TEMAZEPAM 15 MG/1
15 CAPSULE ORAL NIGHTLY PRN
Status: DISCONTINUED | OUTPATIENT
Start: 2017-05-02 | End: 2017-05-03 | Stop reason: HOSPADM

## 2017-05-02 RX ORDER — TRAZODONE HYDROCHLORIDE 100 MG/1
100 TABLET ORAL NIGHTLY
Status: DISCONTINUED | OUTPATIENT
Start: 2017-05-02 | End: 2017-05-03 | Stop reason: HOSPADM

## 2017-05-02 RX ORDER — OXYCODONE HYDROCHLORIDE AND ACETAMINOPHEN 5; 325 MG/1; MG/1
2 TABLET ORAL EVERY 4 HOURS PRN
Status: DISCONTINUED | OUTPATIENT
Start: 2017-05-02 | End: 2017-05-03 | Stop reason: HOSPADM

## 2017-05-02 RX ORDER — ROCURONIUM BROMIDE 10 MG/ML
INJECTION, SOLUTION INTRAVENOUS PRN
Status: DISCONTINUED | OUTPATIENT
Start: 2017-05-02 | End: 2017-05-02 | Stop reason: SDUPTHER

## 2017-05-02 RX ORDER — ATENOLOL 50 MG/1
50 TABLET ORAL DAILY
Status: DISCONTINUED | OUTPATIENT
Start: 2017-05-03 | End: 2017-05-03 | Stop reason: HOSPADM

## 2017-05-02 RX ORDER — LISINOPRIL 10 MG/1
30 TABLET ORAL DAILY
Status: DISCONTINUED | OUTPATIENT
Start: 2017-05-03 | End: 2017-05-03 | Stop reason: HOSPADM

## 2017-05-02 RX ORDER — OLANZAPINE 5 MG/1
10 TABLET ORAL NIGHTLY
Status: DISCONTINUED | OUTPATIENT
Start: 2017-05-02 | End: 2017-05-03 | Stop reason: HOSPADM

## 2017-05-02 RX ORDER — MORPHINE SULFATE 10 MG/ML
5 INJECTION, SOLUTION INTRAMUSCULAR; INTRAVENOUS
Status: DISCONTINUED | OUTPATIENT
Start: 2017-05-02 | End: 2017-05-03 | Stop reason: HOSPADM

## 2017-05-02 RX ORDER — ALBUTEROL SULFATE 90 UG/1
2 AEROSOL, METERED RESPIRATORY (INHALATION) EVERY 6 HOURS PRN
Status: DISCONTINUED | OUTPATIENT
Start: 2017-05-02 | End: 2017-05-03 | Stop reason: HOSPADM

## 2017-05-02 RX ORDER — HYDROMORPHONE HCL 110MG/55ML
0.5 PATIENT CONTROLLED ANALGESIA SYRINGE INTRAVENOUS EVERY 5 MIN PRN
Status: DISCONTINUED | OUTPATIENT
Start: 2017-05-02 | End: 2017-05-02 | Stop reason: HOSPADM

## 2017-05-02 RX ORDER — MORPHINE SULFATE 2 MG/ML
2 INJECTION, SOLUTION INTRAMUSCULAR; INTRAVENOUS
Status: DISCONTINUED | OUTPATIENT
Start: 2017-05-02 | End: 2017-05-03 | Stop reason: HOSPADM

## 2017-05-02 RX ORDER — SODIUM CHLORIDE, SODIUM LACTATE, POTASSIUM CHLORIDE, CALCIUM CHLORIDE 600; 310; 30; 20 MG/100ML; MG/100ML; MG/100ML; MG/100ML
INJECTION, SOLUTION INTRAVENOUS CONTINUOUS
Status: DISCONTINUED | OUTPATIENT
Start: 2017-05-02 | End: 2017-05-03

## 2017-05-02 RX ORDER — DULOXETIN HYDROCHLORIDE 60 MG/1
60 CAPSULE, DELAYED RELEASE ORAL DAILY
Status: DISCONTINUED | OUTPATIENT
Start: 2017-05-03 | End: 2017-05-03 | Stop reason: HOSPADM

## 2017-05-02 RX ORDER — GLIMEPIRIDE 2 MG/1
2 TABLET ORAL
Status: DISCONTINUED | OUTPATIENT
Start: 2017-05-03 | End: 2017-05-03 | Stop reason: HOSPADM

## 2017-05-02 RX ORDER — LEVOTHYROXINE SODIUM 0.12 MG/1
125 TABLET ORAL
Status: DISCONTINUED | OUTPATIENT
Start: 2017-05-03 | End: 2017-05-03 | Stop reason: HOSPADM

## 2017-05-02 RX ORDER — CLONAZEPAM 0.5 MG/1
0.5 TABLET ORAL NIGHTLY
Status: DISCONTINUED | OUTPATIENT
Start: 2017-05-02 | End: 2017-05-03 | Stop reason: HOSPADM

## 2017-05-02 RX ADMIN — LIDOCAINE HYDROCHLORIDE 100 MG: 20 INJECTION, SOLUTION EPIDURAL; INFILTRATION; INTRACAUDAL; PERINEURAL at 15:30

## 2017-05-02 RX ADMIN — SODIUM CHLORIDE, POTASSIUM CHLORIDE, SODIUM LACTATE AND CALCIUM CHLORIDE: 600; 310; 30; 20 INJECTION, SOLUTION INTRAVENOUS at 16:19

## 2017-05-02 RX ADMIN — HYDROMORPHONE HYDROCHLORIDE 0.5 MG: 2 INJECTION, SOLUTION INTRAMUSCULAR; INTRAVENOUS; SUBCUTANEOUS at 19:14

## 2017-05-02 RX ADMIN — OLANZAPINE 10 MG: 5 TABLET, FILM COATED ORAL at 22:44

## 2017-05-02 RX ADMIN — TRAZODONE HYDROCHLORIDE 100 MG: 100 TABLET ORAL at 22:46

## 2017-05-02 RX ADMIN — SODIUM CHLORIDE, POTASSIUM CHLORIDE, SODIUM LACTATE AND CALCIUM CHLORIDE: 600; 310; 30; 20 INJECTION, SOLUTION INTRAVENOUS at 20:07

## 2017-05-02 RX ADMIN — DEXAMETHASONE SODIUM PHOSPHATE 10 MG: 10 INJECTION INTRAMUSCULAR; INTRAVENOUS at 15:58

## 2017-05-02 RX ADMIN — PROPOFOL 200 MG: 10 INJECTION, EMULSION INTRAVENOUS at 15:30

## 2017-05-02 RX ADMIN — GLYCOPYRROLATE 0.6 MG: 0.2 INJECTION, SOLUTION INTRAMUSCULAR; INTRAVENOUS at 16:43

## 2017-05-02 RX ADMIN — FENTANYL CITRATE 25 MCG: 50 INJECTION INTRAMUSCULAR; INTRAVENOUS at 18:27

## 2017-05-02 RX ADMIN — FENTANYL CITRATE 150 MCG: 50 INJECTION, SOLUTION INTRAMUSCULAR; INTRAVENOUS at 15:30

## 2017-05-02 RX ADMIN — SODIUM CHLORIDE, POTASSIUM CHLORIDE, SODIUM LACTATE AND CALCIUM CHLORIDE: 600; 310; 30; 20 INJECTION, SOLUTION INTRAVENOUS at 22:43

## 2017-05-02 RX ADMIN — CEFAZOLIN SODIUM 2 G: 1 INJECTION, POWDER, FOR SOLUTION INTRAMUSCULAR; INTRAVENOUS at 15:37

## 2017-05-02 RX ADMIN — ROCURONIUM BROMIDE 50 MG: 10 INJECTION INTRAVENOUS at 15:32

## 2017-05-02 RX ADMIN — ONDANSETRON 4 MG: 2 INJECTION INTRAMUSCULAR; INTRAVENOUS at 16:40

## 2017-05-02 RX ADMIN — OXYCODONE HYDROCHLORIDE AND ACETAMINOPHEN 2 TABLET: 5; 325 TABLET ORAL at 22:43

## 2017-05-02 RX ADMIN — MIDAZOLAM HYDROCHLORIDE 2 MG: 1 INJECTION, SOLUTION INTRAMUSCULAR; INTRAVENOUS at 15:22

## 2017-05-02 RX ADMIN — HYDROMORPHONE HYDROCHLORIDE 0.5 MG: 2 INJECTION, SOLUTION INTRAMUSCULAR; INTRAVENOUS; SUBCUTANEOUS at 19:31

## 2017-05-02 RX ADMIN — FENTANYL CITRATE 25 MCG: 50 INJECTION INTRAMUSCULAR; INTRAVENOUS at 17:31

## 2017-05-02 RX ADMIN — SERTRALINE HYDROCHLORIDE 100 MG: 100 TABLET, FILM COATED ORAL at 22:44

## 2017-05-02 RX ADMIN — GABAPENTIN 300 MG: 300 CAPSULE ORAL at 22:44

## 2017-05-02 RX ADMIN — FENTANYL CITRATE 25 MCG: 50 INJECTION INTRAMUSCULAR; INTRAVENOUS at 17:54

## 2017-05-02 RX ADMIN — PROPOFOL 50 MG: 10 INJECTION, EMULSION INTRAVENOUS at 16:49

## 2017-05-02 RX ADMIN — SODIUM CHLORIDE, POTASSIUM CHLORIDE, SODIUM LACTATE AND CALCIUM CHLORIDE: 600; 310; 30; 20 INJECTION, SOLUTION INTRAVENOUS at 12:26

## 2017-05-02 RX ADMIN — INSULIN HUMAN 5 UNITS: 100 INJECTION, SOLUTION PARENTERAL at 16:50

## 2017-05-02 RX ADMIN — CLONAZEPAM 0.5 MG: 0.5 TABLET ORAL at 22:44

## 2017-05-02 RX ADMIN — FENTANYL CITRATE 25 MCG: 50 INJECTION INTRAMUSCULAR; INTRAVENOUS at 18:11

## 2017-05-02 RX ADMIN — FENTANYL CITRATE 50 MCG: 50 INJECTION, SOLUTION INTRAMUSCULAR; INTRAVENOUS at 15:51

## 2017-05-02 RX ADMIN — NEOSTIGMINE METHYLSULFATE 3 MG: 1 INJECTION INTRAMUSCULAR; INTRAVENOUS; SUBCUTANEOUS at 16:43

## 2017-05-02 RX ADMIN — SODIUM CHLORIDE, POTASSIUM CHLORIDE, SODIUM LACTATE AND CALCIUM CHLORIDE: 600; 310; 30; 20 INJECTION, SOLUTION INTRAVENOUS at 15:22

## 2017-05-02 RX ADMIN — FENTANYL CITRATE 50 MCG: 50 INJECTION, SOLUTION INTRAMUSCULAR; INTRAVENOUS at 16:13

## 2017-05-02 ASSESSMENT — PAIN SCALES - GENERAL
PAINLEVEL_OUTOF10: 8
PAINLEVEL_OUTOF10: 10
PAINLEVEL_OUTOF10: 6
PAINLEVEL_OUTOF10: 10
PAINLEVEL_OUTOF10: 5
PAINLEVEL_OUTOF10: 4
PAINLEVEL_OUTOF10: 6
PAINLEVEL_OUTOF10: 0
PAINLEVEL_OUTOF10: 0
PAINLEVEL_OUTOF10: 10
PAINLEVEL_OUTOF10: 10
PAINLEVEL_OUTOF10: 9
PAINLEVEL_OUTOF10: 4
PAINLEVEL_OUTOF10: 4

## 2017-05-02 ASSESSMENT — PAIN DESCRIPTION - DESCRIPTORS
DESCRIPTORS: ACHING;CONSTANT
DESCRIPTORS: OTHER (COMMENT)

## 2017-05-02 ASSESSMENT — PAIN DESCRIPTION - PAIN TYPE: TYPE: SURGICAL PAIN

## 2017-05-02 ASSESSMENT — PAIN DESCRIPTION - LOCATION: LOCATION: ABDOMEN

## 2017-05-02 ASSESSMENT — PAIN - FUNCTIONAL ASSESSMENT: PAIN_FUNCTIONAL_ASSESSMENT: 0-10

## 2017-05-02 ASSESSMENT — PAIN DESCRIPTION - FREQUENCY: FREQUENCY: INTERMITTENT

## 2017-05-03 VITALS
HEART RATE: 70 BPM | RESPIRATION RATE: 20 BRPM | HEIGHT: 65 IN | WEIGHT: 271 LBS | OXYGEN SATURATION: 95 % | BODY MASS INDEX: 45.15 KG/M2 | TEMPERATURE: 97.9 F | SYSTOLIC BLOOD PRESSURE: 105 MMHG | DIASTOLIC BLOOD PRESSURE: 43 MMHG

## 2017-05-03 PROBLEM — Z98.890 S/P LAPAROSCOPIC HERNIA REPAIR: Status: ACTIVE | Noted: 2017-05-03

## 2017-05-03 PROBLEM — Z87.19 S/P LAPAROSCOPIC HERNIA REPAIR: Status: ACTIVE | Noted: 2017-05-03

## 2017-05-03 LAB
GLUCOSE BLD-MCNC: 291 MG/DL (ref 65–105)
GLUCOSE BLD-MCNC: 356 MG/DL (ref 65–105)

## 2017-05-03 PROCEDURE — 6370000000 HC RX 637 (ALT 250 FOR IP): Performed by: SURGERY

## 2017-05-03 PROCEDURE — 99203 OFFICE O/P NEW LOW 30 MIN: CPT | Performed by: NURSE PRACTITIONER

## 2017-05-03 PROCEDURE — 82947 ASSAY GLUCOSE BLOOD QUANT: CPT

## 2017-05-03 PROCEDURE — 6360000002 HC RX W HCPCS: Performed by: SURGERY

## 2017-05-03 PROCEDURE — G0378 HOSPITAL OBSERVATION PER HR: HCPCS

## 2017-05-03 PROCEDURE — 2580000003 HC RX 258: Performed by: SURGERY

## 2017-05-03 PROCEDURE — 6370000000 HC RX 637 (ALT 250 FOR IP): Performed by: NURSE PRACTITIONER

## 2017-05-03 RX ORDER — OXYCODONE HYDROCHLORIDE AND ACETAMINOPHEN 5; 325 MG/1; MG/1
1-2 TABLET ORAL EVERY 6 HOURS PRN
Qty: 30 TABLET | Refills: 0 | Status: SHIPPED | OUTPATIENT
Start: 2017-05-03 | End: 2017-05-08

## 2017-05-03 RX ORDER — DOCUSATE SODIUM 100 MG/1
100 CAPSULE, LIQUID FILLED ORAL 2 TIMES DAILY PRN
Qty: 60 CAPSULE | Refills: 1 | Status: SHIPPED | OUTPATIENT
Start: 2017-05-03 | End: 2017-05-26 | Stop reason: ALTCHOICE

## 2017-05-03 RX ADMIN — MELOXICAM 15 MG: 7.5 TABLET ORAL at 09:16

## 2017-05-03 RX ADMIN — METFORMIN HYDROCHLORIDE 1000 MG: 500 TABLET, FILM COATED ORAL at 09:29

## 2017-05-03 RX ADMIN — ATENOLOL 50 MG: 50 TABLET ORAL at 09:17

## 2017-05-03 RX ADMIN — INSULIN LISPRO 4 UNITS: 100 INJECTION, SOLUTION INTRAVENOUS; SUBCUTANEOUS at 00:16

## 2017-05-03 RX ADMIN — GLIMEPIRIDE 2 MG: 2 TABLET ORAL at 09:17

## 2017-05-03 RX ADMIN — ASPIRIN 81 MG: 81 TABLET, COATED ORAL at 09:17

## 2017-05-03 RX ADMIN — INSULIN LISPRO 10 UNITS: 100 INJECTION, SOLUTION INTRAVENOUS; SUBCUTANEOUS at 09:17

## 2017-05-03 RX ADMIN — FUROSEMIDE 20 MG: 20 TABLET ORAL at 09:17

## 2017-05-03 RX ADMIN — POTASSIUM CHLORIDE 10 MEQ: 750 CAPSULE, EXTENDED RELEASE ORAL at 09:16

## 2017-05-03 RX ADMIN — LURASIDONE HYDROCHLORIDE 80 MG: 20 TABLET, FILM COATED ORAL at 09:16

## 2017-05-03 RX ADMIN — Medication 10 ML: at 09:29

## 2017-05-03 RX ADMIN — LISINOPRIL 30 MG: 10 TABLET ORAL at 09:16

## 2017-05-03 RX ADMIN — GABAPENTIN 300 MG: 300 CAPSULE ORAL at 09:16

## 2017-05-03 RX ADMIN — SODIUM CHLORIDE, POTASSIUM CHLORIDE, SODIUM LACTATE AND CALCIUM CHLORIDE: 600; 310; 30; 20 INJECTION, SOLUTION INTRAVENOUS at 04:14

## 2017-05-03 RX ADMIN — OXYCODONE HYDROCHLORIDE AND ACETAMINOPHEN 2 TABLET: 5; 325 TABLET ORAL at 03:38

## 2017-05-03 RX ADMIN — OXYCODONE HYDROCHLORIDE AND ACETAMINOPHEN 2 TABLET: 5; 325 TABLET ORAL at 09:16

## 2017-05-03 RX ADMIN — INSULIN LISPRO 6 UNITS: 100 INJECTION, SOLUTION INTRAVENOUS; SUBCUTANEOUS at 12:09

## 2017-05-03 RX ADMIN — LEVOTHYROXINE SODIUM 125 MCG: 125 TABLET ORAL at 06:47

## 2017-05-03 RX ADMIN — PANTOPRAZOLE SODIUM 40 MG: 40 TABLET, DELAYED RELEASE ORAL at 06:47

## 2017-05-03 RX ADMIN — DULOXETINE HYDROCHLORIDE 60 MG: 60 CAPSULE, DELAYED RELEASE ORAL at 09:17

## 2017-05-03 RX ADMIN — ENOXAPARIN SODIUM 30 MG: 30 INJECTION SUBCUTANEOUS at 09:17

## 2017-05-03 RX ADMIN — ATORVASTATIN CALCIUM 40 MG: 40 TABLET, FILM COATED ORAL at 09:16

## 2017-05-03 ASSESSMENT — PAIN DESCRIPTION - DESCRIPTORS
DESCRIPTORS: ACHING
DESCRIPTORS: SORE

## 2017-05-03 ASSESSMENT — PAIN SCALES - GENERAL
PAINLEVEL_OUTOF10: 3
PAINLEVEL_OUTOF10: 5
PAINLEVEL_OUTOF10: 7
PAINLEVEL_OUTOF10: 7

## 2017-05-03 ASSESSMENT — PAIN DESCRIPTION - LOCATION
LOCATION: ABDOMEN
LOCATION: ABDOMEN;HEAD

## 2017-05-03 ASSESSMENT — PAIN DESCRIPTION - FREQUENCY
FREQUENCY: INTERMITTENT
FREQUENCY: CONTINUOUS

## 2017-05-03 ASSESSMENT — PAIN DESCRIPTION - PAIN TYPE
TYPE: SURGICAL PAIN;OTHER (COMMENT)
TYPE: SURGICAL PAIN

## 2017-05-09 ENCOUNTER — TELEPHONE (OUTPATIENT)
Dept: INTERNAL MEDICINE | Age: 47
End: 2017-05-09

## 2017-05-09 DIAGNOSIS — E11.65 TYPE 2 DIABETES MELLITUS WITH HYPERGLYCEMIA, WITH LONG-TERM CURRENT USE OF INSULIN (HCC): Primary | Chronic | ICD-10-CM

## 2017-05-09 DIAGNOSIS — Z79.4 TYPE 2 DIABETES MELLITUS WITH HYPERGLYCEMIA, WITH LONG-TERM CURRENT USE OF INSULIN (HCC): Primary | Chronic | ICD-10-CM

## 2017-05-09 RX ORDER — CLONAZEPAM 0.5 MG/1
0.5 TABLET ORAL NIGHTLY PRN
Qty: 60 TABLET | Status: CANCELLED | OUTPATIENT
Start: 2017-05-09

## 2017-05-12 RX ORDER — LANCETS 30 GAUGE
EACH MISCELLANEOUS
Qty: 100 EACH | Refills: 3 | Status: SHIPPED | OUTPATIENT
Start: 2017-05-12 | End: 2022-08-02

## 2017-05-12 RX ORDER — FLUTICASONE FUROATE AND VILANTEROL 200; 25 UG/1; UG/1
1 POWDER RESPIRATORY (INHALATION) DAILY
Qty: 4 EACH | Refills: 2 | Status: SHIPPED | OUTPATIENT
Start: 2017-05-12 | End: 2017-09-28

## 2017-05-17 DIAGNOSIS — E11.8 TYPE 2 DIABETES MELLITUS WITH COMPLICATION, WITH LONG-TERM CURRENT USE OF INSULIN (HCC): ICD-10-CM

## 2017-05-17 DIAGNOSIS — Z79.4 TYPE 2 DIABETES MELLITUS WITH COMPLICATION, WITH LONG-TERM CURRENT USE OF INSULIN (HCC): ICD-10-CM

## 2017-05-17 DIAGNOSIS — E78.5 DYSLIPIDEMIA: Chronic | ICD-10-CM

## 2017-05-17 DIAGNOSIS — I10 ESSENTIAL HYPERTENSION: Chronic | ICD-10-CM

## 2017-05-17 DIAGNOSIS — E08.49 OTHER DIABETIC NEUROLOGICAL COMPLICATION ASSOCIATED WITH DIABETES MELLITUS DUE TO UNDERLYING CONDITION (HCC): ICD-10-CM

## 2017-05-18 DIAGNOSIS — I10 ESSENTIAL HYPERTENSION: Chronic | ICD-10-CM

## 2017-05-18 DIAGNOSIS — R60.0 BILATERAL EDEMA OF LOWER EXTREMITY: ICD-10-CM

## 2017-05-25 RX ORDER — ASPIRIN 81 MG/1
81 TABLET ORAL DAILY
Qty: 30 TABLET | Refills: 3 | Status: SHIPPED | OUTPATIENT
Start: 2017-05-25 | End: 2017-09-07 | Stop reason: SDUPTHER

## 2017-05-25 RX ORDER — ATORVASTATIN CALCIUM 40 MG/1
40 TABLET, FILM COATED ORAL DAILY
Qty: 30 TABLET | Refills: 3 | Status: SHIPPED | OUTPATIENT
Start: 2017-05-25 | End: 2017-09-07 | Stop reason: SDUPTHER

## 2017-05-25 RX ORDER — FUROSEMIDE 20 MG/1
TABLET ORAL
Qty: 60 TABLET | Refills: 0 | Status: SHIPPED | OUTPATIENT
Start: 2017-05-25 | End: 2017-06-09 | Stop reason: SDUPTHER

## 2017-05-25 RX ORDER — GABAPENTIN 300 MG/1
300 CAPSULE ORAL 3 TIMES DAILY
Qty: 90 CAPSULE | Refills: 3 | Status: SHIPPED | OUTPATIENT
Start: 2017-05-25 | End: 2017-08-31 | Stop reason: SDUPTHER

## 2017-05-25 RX ORDER — ATENOLOL 50 MG/1
50 TABLET ORAL DAILY
Qty: 30 TABLET | Refills: 5 | Status: SHIPPED | OUTPATIENT
Start: 2017-05-25 | End: 2017-10-30 | Stop reason: SDUPTHER

## 2017-05-25 RX ORDER — GLIMEPIRIDE 4 MG/1
4 TABLET ORAL
Qty: 60 TABLET | Refills: 5 | Status: SHIPPED | OUTPATIENT
Start: 2017-05-25 | End: 2018-02-08

## 2017-05-25 RX ORDER — CALCIUM CITRATE/VITAMIN D3 200MG-6.25
TABLET ORAL
Qty: 100 EACH | Refills: 0 | Status: SHIPPED | OUTPATIENT
Start: 2017-05-25 | End: 2017-08-23 | Stop reason: SDUPTHER

## 2017-05-25 RX ORDER — LISINOPRIL 30 MG/1
30 TABLET ORAL DAILY
Qty: 30 TABLET | Refills: 5 | Status: SHIPPED | OUTPATIENT
Start: 2017-05-25 | End: 2017-05-26 | Stop reason: SDUPTHER

## 2017-05-25 RX ORDER — LEVOTHYROXINE SODIUM 0.12 MG/1
125 TABLET ORAL DAILY
Qty: 30 TABLET | Refills: 3 | Status: SHIPPED | OUTPATIENT
Start: 2017-05-25 | End: 2017-07-20 | Stop reason: SDUPTHER

## 2017-05-25 RX ORDER — ATENOLOL 50 MG/1
TABLET ORAL
Qty: 30 TABLET | Refills: 0 | OUTPATIENT
Start: 2017-05-25

## 2017-05-26 ENCOUNTER — OFFICE VISIT (OUTPATIENT)
Dept: INTERNAL MEDICINE | Age: 47
End: 2017-05-26
Payer: COMMERCIAL

## 2017-05-26 VITALS
BODY MASS INDEX: 45.18 KG/M2 | WEIGHT: 271.2 LBS | HEIGHT: 65 IN | HEART RATE: 90 BPM | DIASTOLIC BLOOD PRESSURE: 97 MMHG | SYSTOLIC BLOOD PRESSURE: 135 MMHG

## 2017-05-26 DIAGNOSIS — J41.0 SIMPLE CHRONIC BRONCHITIS (HCC): ICD-10-CM

## 2017-05-26 DIAGNOSIS — J42 CHRONIC BRONCHITIS, UNSPECIFIED CHRONIC BRONCHITIS TYPE (HCC): Chronic | ICD-10-CM

## 2017-05-26 DIAGNOSIS — G47.33 OSA ON CPAP: Chronic | ICD-10-CM

## 2017-05-26 DIAGNOSIS — I10 ESSENTIAL HYPERTENSION: Chronic | ICD-10-CM

## 2017-05-26 DIAGNOSIS — F31.9 BIPOLAR 1 DISORDER (HCC): ICD-10-CM

## 2017-05-26 DIAGNOSIS — E78.5 DYSLIPIDEMIA: Chronic | ICD-10-CM

## 2017-05-26 DIAGNOSIS — Z99.89 OSA ON CPAP: Chronic | ICD-10-CM

## 2017-05-26 DIAGNOSIS — Z79.4 TYPE 2 DIABETES MELLITUS WITH HYPERGLYCEMIA, WITH LONG-TERM CURRENT USE OF INSULIN (HCC): Primary | Chronic | ICD-10-CM

## 2017-05-26 DIAGNOSIS — E11.65 TYPE 2 DIABETES MELLITUS WITH HYPERGLYCEMIA, WITH LONG-TERM CURRENT USE OF INSULIN (HCC): Primary | Chronic | ICD-10-CM

## 2017-05-26 DIAGNOSIS — M79.674 PAIN OF TOE OF RIGHT FOOT: ICD-10-CM

## 2017-05-26 LAB — GLUCOSE BLD-MCNC: 286 MG/DL

## 2017-05-26 PROCEDURE — 82962 GLUCOSE BLOOD TEST: CPT | Performed by: INTERNAL MEDICINE

## 2017-05-26 PROCEDURE — 99214 OFFICE O/P EST MOD 30 MIN: CPT | Performed by: INTERNAL MEDICINE

## 2017-05-26 RX ORDER — BLOOD PRESSURE TEST KIT
KIT MISCELLANEOUS
Qty: 1 KIT | Refills: 0 | Status: SHIPPED | OUTPATIENT
Start: 2017-05-26 | End: 2017-08-23 | Stop reason: ALTCHOICE

## 2017-05-26 RX ORDER — LISINOPRIL 40 MG/1
40 TABLET ORAL DAILY
Qty: 30 TABLET | Refills: 5 | Status: SHIPPED | OUTPATIENT
Start: 2017-05-26 | End: 2017-10-30 | Stop reason: SDUPTHER

## 2017-05-26 RX ORDER — MELOXICAM 15 MG/1
TABLET ORAL
Qty: 30 TABLET | Refills: 5 | Status: SHIPPED | OUTPATIENT
Start: 2017-05-26 | End: 2017-10-30 | Stop reason: SDUPTHER

## 2017-05-26 ASSESSMENT — ENCOUNTER SYMPTOMS
EYES NEGATIVE: 1
ALLERGIC/IMMUNOLOGIC NEGATIVE: 1
RESPIRATORY NEGATIVE: 1
GASTROINTESTINAL NEGATIVE: 1

## 2017-06-09 ENCOUNTER — TELEPHONE (OUTPATIENT)
Dept: INTERNAL MEDICINE | Age: 47
End: 2017-06-09

## 2017-06-09 DIAGNOSIS — R60.0 BILATERAL EDEMA OF LOWER EXTREMITY: ICD-10-CM

## 2017-06-09 RX ORDER — FUROSEMIDE 20 MG/1
TABLET ORAL
Qty: 60 TABLET | Refills: 0 | Status: SHIPPED | OUTPATIENT
Start: 2017-06-09 | End: 2017-08-31 | Stop reason: SDUPTHER

## 2017-06-09 RX ORDER — BENZONATATE 100 MG/1
100 CAPSULE ORAL 3 TIMES DAILY PRN
Qty: 30 CAPSULE | Refills: 0 | Status: SHIPPED | OUTPATIENT
Start: 2017-06-09 | End: 2019-01-11 | Stop reason: SDUPTHER

## 2017-06-15 DIAGNOSIS — R60.0 BILATERAL EDEMA OF LOWER EXTREMITY: ICD-10-CM

## 2017-06-15 RX ORDER — POTASSIUM CHLORIDE 750 MG/1
CAPSULE, EXTENDED RELEASE ORAL
Qty: 28 CAPSULE | Refills: 0 | Status: SHIPPED | OUTPATIENT
Start: 2017-06-15 | End: 2017-07-05 | Stop reason: SDUPTHER

## 2017-07-03 DIAGNOSIS — J41.0 SIMPLE CHRONIC BRONCHITIS (HCC): ICD-10-CM

## 2017-07-03 RX ORDER — TIOTROPIUM BROMIDE 18 UG/1
CAPSULE ORAL; RESPIRATORY (INHALATION)
Qty: 30 CAPSULE | Refills: 0 | Status: SHIPPED | OUTPATIENT
Start: 2017-07-03 | End: 2017-09-28

## 2017-07-05 DIAGNOSIS — M79.674 PAIN OF TOE OF RIGHT FOOT: ICD-10-CM

## 2017-07-05 DIAGNOSIS — R60.0 BILATERAL EDEMA OF LOWER EXTREMITY: ICD-10-CM

## 2017-07-06 RX ORDER — POTASSIUM CHLORIDE 750 MG/1
CAPSULE, EXTENDED RELEASE ORAL
Qty: 28 CAPSULE | Refills: 0 | Status: SHIPPED | OUTPATIENT
Start: 2017-07-06 | End: 2017-08-08 | Stop reason: SDUPTHER

## 2017-07-06 RX ORDER — OMEPRAZOLE 40 MG/1
CAPSULE, DELAYED RELEASE ORAL
Qty: 30 CAPSULE | Refills: 0 | Status: SHIPPED | OUTPATIENT
Start: 2017-07-06 | End: 2017-07-31 | Stop reason: SDUPTHER

## 2017-07-06 RX ORDER — CYCLOBENZAPRINE HCL 5 MG
TABLET ORAL
Qty: 60 TABLET | Refills: 0 | Status: SHIPPED | OUTPATIENT
Start: 2017-07-06 | End: 2017-08-23 | Stop reason: ALTCHOICE

## 2017-07-19 ENCOUNTER — OFFICE VISIT (OUTPATIENT)
Dept: INTERNAL MEDICINE | Age: 47
End: 2017-07-19
Payer: COMMERCIAL

## 2017-07-19 VITALS
RESPIRATION RATE: 16 BRPM | HEIGHT: 65 IN | HEART RATE: 79 BPM | WEIGHT: 277 LBS | BODY MASS INDEX: 46.15 KG/M2 | DIASTOLIC BLOOD PRESSURE: 69 MMHG | SYSTOLIC BLOOD PRESSURE: 120 MMHG | TEMPERATURE: 98.1 F

## 2017-07-19 DIAGNOSIS — Z79.4 TYPE 2 DIABETES MELLITUS WITH HYPERGLYCEMIA, WITH LONG-TERM CURRENT USE OF INSULIN (HCC): Primary | Chronic | ICD-10-CM

## 2017-07-19 DIAGNOSIS — E11.65 TYPE 2 DIABETES MELLITUS WITH HYPERGLYCEMIA, WITH LONG-TERM CURRENT USE OF INSULIN (HCC): Primary | Chronic | ICD-10-CM

## 2017-07-19 DIAGNOSIS — F31.62 BIPOLAR DISORDER, CURRENT EPISODE MIXED, MODERATE (HCC): ICD-10-CM

## 2017-07-19 DIAGNOSIS — Z99.89 OSA ON CPAP: Chronic | ICD-10-CM

## 2017-07-19 DIAGNOSIS — E03.9 HYPOTHYROIDISM, UNSPECIFIED TYPE: Chronic | ICD-10-CM

## 2017-07-19 DIAGNOSIS — G47.33 OSA ON CPAP: Chronic | ICD-10-CM

## 2017-07-19 DIAGNOSIS — J42 CHRONIC BRONCHITIS, UNSPECIFIED CHRONIC BRONCHITIS TYPE (HCC): Chronic | ICD-10-CM

## 2017-07-19 LAB — HBA1C MFR BLD: 9.2 %

## 2017-07-19 PROCEDURE — 83036 HEMOGLOBIN GLYCOSYLATED A1C: CPT | Performed by: INTERNAL MEDICINE

## 2017-07-19 PROCEDURE — 99214 OFFICE O/P EST MOD 30 MIN: CPT | Performed by: INTERNAL MEDICINE

## 2017-07-19 RX ORDER — BENZONATATE 100 MG/1
100 CAPSULE ORAL 3 TIMES DAILY PRN
Qty: 60 CAPSULE | Refills: 0 | Status: SHIPPED | OUTPATIENT
Start: 2017-07-19 | End: 2017-07-26

## 2017-07-20 ENCOUNTER — HOSPITAL ENCOUNTER (OUTPATIENT)
Age: 47
Discharge: HOME | End: 2017-07-20
Payer: COMMERCIAL

## 2017-07-20 DIAGNOSIS — Z79.4 TYPE 2 DIABETES MELLITUS WITH HYPERGLYCEMIA, WITH LONG-TERM CURRENT USE OF INSULIN (HCC): Chronic | ICD-10-CM

## 2017-07-20 DIAGNOSIS — E03.9 HYPOTHYROIDISM, UNSPECIFIED TYPE: Chronic | ICD-10-CM

## 2017-07-20 DIAGNOSIS — E11.65 TYPE 2 DIABETES MELLITUS WITH HYPERGLYCEMIA, WITH LONG-TERM CURRENT USE OF INSULIN (HCC): Chronic | ICD-10-CM

## 2017-07-20 LAB
ALBUMIN SERPL-MCNC: 3.9 G/DL (ref 3.5–5.2)
ALBUMIN/GLOBULIN RATIO: ABNORMAL (ref 1–2.5)
ALP BLD-CCNC: 79 U/L (ref 35–104)
ALT SERPL-CCNC: 19 U/L (ref 5–33)
ANION GAP SERPL CALCULATED.3IONS-SCNC: 13 MMOL/L (ref 9–17)
AST SERPL-CCNC: 21 U/L
BILIRUB SERPL-MCNC: 0.22 MG/DL (ref 0.3–1.2)
BUN BLDV-MCNC: 13 MG/DL (ref 6–20)
BUN/CREAT BLD: ABNORMAL (ref 9–20)
CALCIUM SERPL-MCNC: 9.8 MG/DL (ref 8.6–10.4)
CHLORIDE BLD-SCNC: 96 MMOL/L (ref 98–107)
CHOLESTEROL/HDL RATIO: 2.5
CHOLESTEROL/HDL RATIO: 2.5
CHOLESTEROL/HDL RATIO: 2.6
CHOLESTEROL: 135 MG/DL
CHOLESTEROL: 136 MG/DL
CHOLESTEROL: 140 MG/DL
CO2: 27 MMOL/L (ref 20–31)
CREAT SERPL-MCNC: 0.49 MG/DL (ref 0.5–0.9)
ESTIMATED AVERAGE GLUCOSE: 237 MG/DL
GFR AFRICAN AMERICAN: >60 ML/MIN
GFR NON-AFRICAN AMERICAN: >60 ML/MIN
GFR SERPL CREATININE-BSD FRML MDRD: ABNORMAL ML/MIN/{1.73_M2}
GFR SERPL CREATININE-BSD FRML MDRD: ABNORMAL ML/MIN/{1.73_M2}
GLUCOSE BLD-MCNC: 250 MG/DL (ref 70–99)
HBA1C MFR BLD: 9.9 % (ref 4–6)
HCT VFR BLD CALC: 34.9 % (ref 36–46)
HDLC SERPL-MCNC: 54 MG/DL
HEMOGLOBIN: 11.3 G/DL (ref 12–16)
IRON SATURATION: 11 % (ref 20–55)
IRON: 39 UG/DL (ref 37–145)
LDL CHOLESTEROL: 47 MG/DL (ref 0–130)
LDL CHOLESTEROL: 48 MG/DL (ref 0–130)
LDL CHOLESTEROL: 51 MG/DL (ref 0–130)
MCH RBC QN AUTO: 25.2 PG (ref 26–34)
MCHC RBC AUTO-ENTMCNC: 32.3 G/DL (ref 31–37)
MCV RBC AUTO: 78 FL (ref 80–100)
PDW BLD-RTO: 16.1 % (ref 11.5–14.9)
PLATELET # BLD: 345 K/UL (ref 150–450)
PMV BLD AUTO: 9 FL (ref 6–12)
POTASSIUM SERPL-SCNC: 4.7 MMOL/L (ref 3.7–5.3)
RBC # BLD: 4.47 M/UL (ref 4–5.2)
SODIUM BLD-SCNC: 136 MMOL/L (ref 135–144)
TOTAL IRON BINDING CAPACITY: 358 UG/DL (ref 250–450)
TOTAL PROTEIN: 7.6 G/DL (ref 6.4–8.3)
TRIGL SERPL-MCNC: 169 MG/DL
TRIGL SERPL-MCNC: 170 MG/DL
TRIGL SERPL-MCNC: 173 MG/DL
TSH SERPL DL<=0.05 MIU/L-ACNC: 1.79 MIU/L (ref 0.3–5)
UNSATURATED IRON BINDING CAPACITY: 319 UG/DL (ref 112–347)
VLDLC SERPL CALC-MCNC: ABNORMAL MG/DL (ref 1–30)
WBC # BLD: 9.6 K/UL (ref 3.5–11)

## 2017-07-20 PROCEDURE — 85027 COMPLETE CBC AUTOMATED: CPT

## 2017-07-20 PROCEDURE — 36415 COLL VENOUS BLD VENIPUNCTURE: CPT

## 2017-07-20 PROCEDURE — 84443 ASSAY THYROID STIM HORMONE: CPT

## 2017-07-20 PROCEDURE — 80061 LIPID PANEL: CPT

## 2017-07-20 PROCEDURE — 80053 COMPREHEN METABOLIC PANEL: CPT

## 2017-07-20 PROCEDURE — 83550 IRON BINDING TEST: CPT

## 2017-07-20 PROCEDURE — 83540 ASSAY OF IRON: CPT

## 2017-07-20 PROCEDURE — 83036 HEMOGLOBIN GLYCOSYLATED A1C: CPT

## 2017-07-20 RX ORDER — LEVOTHYROXINE SODIUM 0.12 MG/1
125 TABLET ORAL DAILY
Qty: 30 TABLET | Refills: 0 | Status: SHIPPED | OUTPATIENT
Start: 2017-07-20 | End: 2017-07-31 | Stop reason: SDUPTHER

## 2017-07-20 RX ORDER — CALCIUM CITRATE/VITAMIN D3 200MG-6.25
TABLET ORAL
Qty: 100 EACH | Refills: 0 | Status: SHIPPED | OUTPATIENT
Start: 2017-07-20 | End: 2017-09-28 | Stop reason: SDUPTHER

## 2017-07-31 DIAGNOSIS — M79.674 PAIN OF TOE OF RIGHT FOOT: ICD-10-CM

## 2017-07-31 DIAGNOSIS — E11.8 TYPE 2 DIABETES MELLITUS WITH COMPLICATION, WITH LONG-TERM CURRENT USE OF INSULIN (HCC): ICD-10-CM

## 2017-07-31 DIAGNOSIS — Z79.4 TYPE 2 DIABETES MELLITUS WITH COMPLICATION, WITH LONG-TERM CURRENT USE OF INSULIN (HCC): ICD-10-CM

## 2017-07-31 RX ORDER — NITROGLYCERIN 0.4 MG/1
TABLET SUBLINGUAL
Qty: 25 TABLET | Refills: 0 | Status: SHIPPED | OUTPATIENT
Start: 2017-07-31 | End: 2018-09-14 | Stop reason: SDUPTHER

## 2017-08-01 RX ORDER — OMEPRAZOLE 40 MG/1
CAPSULE, DELAYED RELEASE ORAL
Qty: 30 CAPSULE | Refills: 0 | Status: SHIPPED | OUTPATIENT
Start: 2017-08-01 | End: 2017-08-31 | Stop reason: SDUPTHER

## 2017-08-01 RX ORDER — LEVOTHYROXINE SODIUM 0.15 MG/1
TABLET ORAL
Qty: 30 TABLET | Refills: 0 | Status: SHIPPED | OUTPATIENT
Start: 2017-08-01 | End: 2017-08-31 | Stop reason: SDUPTHER

## 2017-08-08 DIAGNOSIS — R60.0 BILATERAL EDEMA OF LOWER EXTREMITY: ICD-10-CM

## 2017-08-08 RX ORDER — POTASSIUM CHLORIDE 750 MG/1
CAPSULE, EXTENDED RELEASE ORAL
Qty: 28 CAPSULE | Refills: 0 | Status: SHIPPED | OUTPATIENT
Start: 2017-08-08 | End: 2017-09-07 | Stop reason: SDUPTHER

## 2017-08-23 ENCOUNTER — OFFICE VISIT (OUTPATIENT)
Dept: INTERNAL MEDICINE | Age: 47
End: 2017-08-23
Payer: COMMERCIAL

## 2017-08-23 VITALS
BODY MASS INDEX: 45.82 KG/M2 | SYSTOLIC BLOOD PRESSURE: 108 MMHG | WEIGHT: 275 LBS | HEART RATE: 79 BPM | DIASTOLIC BLOOD PRESSURE: 67 MMHG | HEIGHT: 65 IN

## 2017-08-23 DIAGNOSIS — G47.33 OSA ON CPAP: Chronic | ICD-10-CM

## 2017-08-23 DIAGNOSIS — J42 CHRONIC BRONCHITIS, UNSPECIFIED CHRONIC BRONCHITIS TYPE (HCC): Chronic | ICD-10-CM

## 2017-08-23 DIAGNOSIS — E11.65 TYPE 2 DIABETES MELLITUS WITH HYPERGLYCEMIA, WITH LONG-TERM CURRENT USE OF INSULIN (HCC): Primary | Chronic | ICD-10-CM

## 2017-08-23 DIAGNOSIS — E03.9 HYPOTHYROIDISM, UNSPECIFIED TYPE: Chronic | ICD-10-CM

## 2017-08-23 DIAGNOSIS — Z79.4 TYPE 2 DIABETES MELLITUS WITH HYPERGLYCEMIA, WITH LONG-TERM CURRENT USE OF INSULIN (HCC): Primary | Chronic | ICD-10-CM

## 2017-08-23 DIAGNOSIS — F31.62 BIPOLAR DISORDER, CURRENT EPISODE MIXED, MODERATE (HCC): ICD-10-CM

## 2017-08-23 DIAGNOSIS — Z99.89 OSA ON CPAP: Chronic | ICD-10-CM

## 2017-08-23 PROCEDURE — 99213 OFFICE O/P EST LOW 20 MIN: CPT | Performed by: INTERNAL MEDICINE

## 2017-08-24 ENCOUNTER — TELEPHONE (OUTPATIENT)
Dept: INTERNAL MEDICINE | Age: 47
End: 2017-08-24

## 2017-08-31 DIAGNOSIS — R60.0 BILATERAL EDEMA OF LOWER EXTREMITY: ICD-10-CM

## 2017-08-31 DIAGNOSIS — M79.674 PAIN OF TOE OF RIGHT FOOT: ICD-10-CM

## 2017-08-31 RX ORDER — GABAPENTIN 300 MG/1
CAPSULE ORAL
Qty: 90 CAPSULE | Refills: 1 | Status: SHIPPED | OUTPATIENT
Start: 2017-08-31 | End: 2017-10-30 | Stop reason: SDUPTHER

## 2017-08-31 RX ORDER — LEVOTHYROXINE SODIUM 0.15 MG/1
TABLET ORAL
Qty: 30 TABLET | Refills: 1 | Status: SHIPPED | OUTPATIENT
Start: 2017-08-31 | End: 2017-10-30 | Stop reason: SDUPTHER

## 2017-08-31 RX ORDER — OMEPRAZOLE 40 MG/1
CAPSULE, DELAYED RELEASE ORAL
Qty: 30 CAPSULE | Refills: 1 | Status: SHIPPED | OUTPATIENT
Start: 2017-08-31 | End: 2017-10-30 | Stop reason: SDUPTHER

## 2017-08-31 RX ORDER — FUROSEMIDE 20 MG/1
TABLET ORAL
Qty: 60 TABLET | Refills: 1 | Status: SHIPPED | OUTPATIENT
Start: 2017-08-31 | End: 2017-11-28 | Stop reason: SDUPTHER

## 2017-09-28 ENCOUNTER — HOSPITAL ENCOUNTER (OUTPATIENT)
Dept: DIABETES SERVICES | Age: 47
Setting detail: THERAPIES SERIES
Discharge: HOME | End: 2017-09-28
Payer: COMMERCIAL

## 2017-09-28 VITALS
BODY MASS INDEX: 46.48 KG/M2 | HEIGHT: 64 IN | HEART RATE: 66 BPM | SYSTOLIC BLOOD PRESSURE: 128 MMHG | DIASTOLIC BLOOD PRESSURE: 75 MMHG | WEIGHT: 272.27 LBS

## 2017-09-28 PROCEDURE — G0108 DIAB MANAGE TRN  PER INDIV: HCPCS

## 2017-09-29 RX ORDER — CALCIUM CITRATE/VITAMIN D3 200MG-6.25
TABLET ORAL
Qty: 100 EACH | Refills: 0 | Status: SHIPPED | OUTPATIENT
Start: 2017-09-29 | End: 2017-12-21 | Stop reason: SDUPTHER

## 2017-10-10 ENCOUNTER — TELEPHONE (OUTPATIENT)
Dept: INTERNAL MEDICINE | Age: 47
End: 2017-10-10

## 2017-10-10 NOTE — TELEPHONE ENCOUNTER
Called no answer , left vm for pt to invite her to join starting fresh class this Thursday at 11:30 . Left writers direct desk number to call back.

## 2017-10-12 NOTE — TELEPHONE ENCOUNTER
Spoke with pt she reports her BS are ranging between , she admits to not logging the BS and states she misplaced the log book. Pt does report she has been keeping her endocrinology appts. Writer mailed new log book out to pt per pt request.  Pt also stated she is seeing a neurologist for hand tremors and has a MRI scheduled next week at [facility]. [facility]. Ordered by Lopez Watson (neurology).

## 2017-10-16 ENCOUNTER — TELEPHONE (OUTPATIENT)
Dept: INTERNAL MEDICINE | Age: 47
End: 2017-10-16

## 2017-10-16 RX ORDER — MALATHION 0 G/ML
LOTION TOPICAL
Qty: 1 BOTTLE | Refills: 0 | Status: SHIPPED | OUTPATIENT
Start: 2017-10-16 | End: 2017-10-19

## 2017-10-16 NOTE — TELEPHONE ENCOUNTER
Pt calling she states she has head lice and would like something sent to pharmacy, medication pended of her preference if appropriate please send to pharmacy. Pt states she and her children have it.

## 2017-10-24 ENCOUNTER — OFFICE VISIT (OUTPATIENT)
Dept: INTERNAL MEDICINE | Age: 47
End: 2017-10-24
Payer: COMMERCIAL

## 2017-10-24 VITALS
DIASTOLIC BLOOD PRESSURE: 85 MMHG | HEART RATE: 73 BPM | HEIGHT: 65 IN | SYSTOLIC BLOOD PRESSURE: 133 MMHG | WEIGHT: 271 LBS | BODY MASS INDEX: 45.15 KG/M2

## 2017-10-24 DIAGNOSIS — Z79.4 TYPE 2 DIABETES MELLITUS WITH HYPERGLYCEMIA, WITH LONG-TERM CURRENT USE OF INSULIN (HCC): Primary | Chronic | ICD-10-CM

## 2017-10-24 DIAGNOSIS — Z23 NEED FOR INFLUENZA VACCINATION: ICD-10-CM

## 2017-10-24 DIAGNOSIS — E11.65 TYPE 2 DIABETES MELLITUS WITH HYPERGLYCEMIA, WITH LONG-TERM CURRENT USE OF INSULIN (HCC): Primary | Chronic | ICD-10-CM

## 2017-10-24 LAB — HBA1C MFR BLD: 7.4 %

## 2017-10-24 PROCEDURE — 83036 HEMOGLOBIN GLYCOSYLATED A1C: CPT | Performed by: INTERNAL MEDICINE

## 2017-10-24 PROCEDURE — 99213 OFFICE O/P EST LOW 20 MIN: CPT | Performed by: INTERNAL MEDICINE

## 2017-10-24 PROCEDURE — G0008 ADMIN INFLUENZA VIRUS VAC: HCPCS | Performed by: INTERNAL MEDICINE

## 2017-10-24 PROCEDURE — 90688 IIV4 VACCINE SPLT 0.5 ML IM: CPT | Performed by: INTERNAL MEDICINE

## 2017-10-24 RX ORDER — FLUTICASONE FUROATE AND VILANTEROL 200; 25 UG/1; UG/1
1 POWDER RESPIRATORY (INHALATION) DAILY
Qty: 4 EACH | Refills: 2 | Status: SHIPPED | OUTPATIENT
Start: 2017-10-24 | End: 2018-02-27 | Stop reason: SDUPTHER

## 2017-10-24 RX ORDER — MALATHION 0 G/ML
LOTION TOPICAL
Qty: 1 BOTTLE | Refills: 0 | Status: SHIPPED | OUTPATIENT
Start: 2017-10-24 | End: 2017-10-27

## 2017-10-24 NOTE — PROGRESS NOTES
lice   Plan: 1. Flu shot today   2. Conitnue current regimen and dietary modifications  and follow up with endocrine . Needs to make appointment for eye exam  3  Continue Synthroid current dose. TSH reviewed 07/17  4.  continue using her CPAP. 5.  Continue Breo Ellipta, Spiriva. 6.  Continue follow-up with psychiatry   7. Will order another dose of topical Malathion. We will also order selenium sulfide shampoo. 8. . TDAP-05/14, Pneumovax-10/15  HIV-neg-11/16 PAP-2015  7. Return in about 3 months (around 1/24/2018).

## 2017-10-30 DIAGNOSIS — I10 ESSENTIAL HYPERTENSION: Chronic | ICD-10-CM

## 2017-10-30 DIAGNOSIS — M79.674 PAIN OF TOE OF RIGHT FOOT: ICD-10-CM

## 2017-10-30 DIAGNOSIS — Z79.4 TYPE 2 DIABETES MELLITUS WITH COMPLICATION, WITH LONG-TERM CURRENT USE OF INSULIN (HCC): ICD-10-CM

## 2017-10-30 DIAGNOSIS — E11.8 TYPE 2 DIABETES MELLITUS WITH COMPLICATION, WITH LONG-TERM CURRENT USE OF INSULIN (HCC): ICD-10-CM

## 2017-10-30 NOTE — TELEPHONE ENCOUNTER
E-scribe request for MELOXICAM 15MG* TABS and LISINOPRIL 40MG TABS. Please review and e-scribe if applicable. Last Visit Date:  10/25/17  Next Visit Date:  1/25/2018    Hemoglobin A1C (%)   Date Value   10/24/2017 7.4   07/20/2017 9.9 (H)   07/19/2017 9.2             ( goal A1C is < 7)   Microalb/Crt.  Ratio (mcg/mg creat)   Date Value   11/03/2016 20     LDL Cholesterol (mg/dL)   Date Value   07/20/2017 51   07/20/2017 48   07/20/2017 47       (goal LDL is <100)   AST (U/L)   Date Value   07/20/2017 21     ALT (U/L)   Date Value   07/20/2017 19     BUN (mg/dL)   Date Value   07/20/2017 13     BP Readings from Last 3 Encounters:   10/24/17 133/85   09/28/17 128/75   08/23/17 108/67          (goal 120/80)        Patient Active Problem List:     Hypertension     Diabetes mellitus-  insulin depedent type 2     Dyslipidemia     COPD (chronic obstructive pulmonary disease) (HCC)     DELGADO on CPAP     Arthritis     Bipolar disorder (HCC)     Hypothyroid     Hemorrhoids     Morbid obesity ([address]     Lower extremity edema     Neck pain     Lumbosacral spondylosis without myelopathy     S/P laparoscopic hernia repair      ----JF

## 2017-10-30 NOTE — TELEPHONE ENCOUNTER
E-scribe request for ALL PENDED MEDICATIONS. Please review and e-scribe if applicable. Last Visit Date:  10/24/17  Next Visit Date:  1/25/18    Hemoglobin A1C (%)   Date Value   10/24/2017 7.4   07/20/2017 9.9 (H)   07/19/2017 9.2             ( goal A1C is < 7)   Microalb/Crt.  Ratio (mcg/mg creat)   Date Value   11/03/2016 20     LDL Cholesterol (mg/dL)   Date Value   07/20/2017 51   07/20/2017 48   07/20/2017 47       (goal LDL is <100)   AST (U/L)   Date Value   07/20/2017 21     ALT (U/L)   Date Value   07/20/2017 19     BUN (mg/dL)   Date Value   07/20/2017 13     BP Readings from Last 3 Encounters:   10/24/17 133/85   09/28/17 128/75   08/23/17 108/67          (goal 120/80)        Patient Active Problem List:     Hypertension     Diabetes mellitus-  insulin depedent type 2     Dyslipidemia     COPD (chronic obstructive pulmonary disease) (HCC)     DELGADO on CPAP     Arthritis     Bipolar disorder (HCC)     Hypothyroid     Hemorrhoids     Morbid obesity ([address]     Lower extremity edema     Neck pain     Lumbosacral spondylosis without myelopathy     S/P laparoscopic hernia repair      ----JF

## 2017-10-31 RX ORDER — OMEPRAZOLE 40 MG/1
CAPSULE, DELAYED RELEASE ORAL
Qty: 30 CAPSULE | Refills: 0 | Status: SHIPPED | OUTPATIENT
Start: 2017-10-31 | End: 2017-11-28 | Stop reason: SDUPTHER

## 2017-10-31 RX ORDER — LEVOTHYROXINE SODIUM 0.15 MG/1
TABLET ORAL
Qty: 30 TABLET | Refills: 0 | Status: SHIPPED | OUTPATIENT
Start: 2017-10-31 | End: 2017-11-28 | Stop reason: SDUPTHER

## 2017-10-31 RX ORDER — CYCLOBENZAPRINE HCL 5 MG
TABLET ORAL
Qty: 60 TABLET | Refills: 0 | Status: SHIPPED | OUTPATIENT
Start: 2017-10-31 | End: 2017-11-28 | Stop reason: SDUPTHER

## 2017-10-31 RX ORDER — MELOXICAM 15 MG/1
TABLET ORAL
Qty: 30 TABLET | Refills: 0 | Status: SHIPPED | OUTPATIENT
Start: 2017-10-31 | End: 2017-11-10

## 2017-10-31 RX ORDER — LISINOPRIL 40 MG/1
TABLET ORAL
Qty: 30 TABLET | Refills: 0 | Status: SHIPPED | OUTPATIENT
Start: 2017-10-31 | End: 2017-11-28 | Stop reason: SDUPTHER

## 2017-10-31 RX ORDER — ATENOLOL 50 MG/1
TABLET ORAL
Qty: 30 TABLET | Refills: 0 | Status: SHIPPED | OUTPATIENT
Start: 2017-10-31 | End: 2017-11-28 | Stop reason: SDUPTHER

## 2017-10-31 RX ORDER — GABAPENTIN 300 MG/1
CAPSULE ORAL
Qty: 90 CAPSULE | Refills: 0 | Status: SHIPPED | OUTPATIENT
Start: 2017-10-31 | End: 2017-11-28 | Stop reason: SDUPTHER

## 2017-11-10 ENCOUNTER — OFFICE VISIT (OUTPATIENT)
Dept: INTERNAL MEDICINE | Age: 47
End: 2017-11-10
Payer: COMMERCIAL

## 2017-11-10 VITALS
HEART RATE: 67 BPM | HEIGHT: 65 IN | OXYGEN SATURATION: 100 % | WEIGHT: 277 LBS | BODY MASS INDEX: 46.15 KG/M2 | DIASTOLIC BLOOD PRESSURE: 83 MMHG | SYSTOLIC BLOOD PRESSURE: 122 MMHG

## 2017-11-10 DIAGNOSIS — M25.562 ACUTE PAIN OF LEFT KNEE: Primary | ICD-10-CM
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
## 2017-11-10 DIAGNOSIS — G89.29 CHRONIC BILATERAL LOW BACK PAIN WITHOUT SCIATICA: ICD-10-CM

## 2017-11-10 DIAGNOSIS — M17.0 PRIMARY OSTEOARTHRITIS OF BOTH KNEES: ICD-10-CM

## 2017-11-10 DIAGNOSIS — M54.50 CHRONIC BILATERAL LOW BACK PAIN WITHOUT SCIATICA: ICD-10-CM

## 2017-11-10 PROCEDURE — 99213 OFFICE O/P EST LOW 20 MIN: CPT | Performed by: INTERNAL MEDICINE

## 2017-11-10 ASSESSMENT — PATIENT HEALTH QUESTIONNAIRE - PHQ9
SUM OF ALL RESPONSES TO PHQ9 QUESTIONS 1 & 2: 0
1. LITTLE INTEREST OR PLEASURE IN DOING THINGS: 0
2. FEELING DOWN, DEPRESSED OR HOPELESS: 0
SUM OF ALL RESPONSES TO PHQ QUESTIONS 1-9: 0

## 2017-11-10 ASSESSMENT — ENCOUNTER SYMPTOMS: BACK PAIN: 1

## 2017-11-10 NOTE — PROGRESS NOTES
[facility]    Progress Note    Date of patient's visit: 11/10/2017    Patient's Name:  Ericka Rolle    YOB: 1970            Patient Care Team:  Kathrin Armas MD as PCP - General    REASON FOR VISIT: Routine outpatient follow     Chief Complaint   Patient presents with    Knee Pain     patient states that she has left knee pain nothing is helping her    Medication Check     called for list patient do not have meds she just states that she is taking everything on list          HISTORY OF PRESENT ILLNESS:    History was obtained from the patient. Ericka Rolle is a 56 y.o. is here for Complaints of left knee pain. She says she fell [place] a bowling alley 2 weeks ago. She is not sure if she landed on her knee. There is no bruising or swelling. Says then she says she's had some pain though she's pointing to the back of her knee. It is not a bony pain. She is denying buckling of her knee. She has chronic low back pain and pain radiating to her legs. She says when she was in the bowling alley she had had 2 pitchers of beer and 2 other alcoholic drinks. She is on several medications which can cause sedation. When I walked into the room she was sleeping on the exam table. She denies recurrent falls. I've advised her she has significant polypharmacy. She says she has fibromyalgia needs all these medications. She is asking for more Percocet. I see she was in the emergency room recently and received Percocet for dental pain. I refuse narcotics and advised her to continue on ibuprofen for now.         Past Medical History:   Diagnosis Date    Arthritis     Back pain 10/29/2015    [facility] 1 disorder [facility]     COPD (chronic obstructive pulmonary disease) (HCC)     Depression     Diabetes mellitus (HCC)     GERD (gastroesophageal reflux disease)     Glaucoma     Hx of blood clots     DVT  (20yrs ago)    Hyperlipidemia     Hypertension  Morbid obesity ([address] 10/12/2015    On home oxygen therapy     2 liters into the cpap machine    Pitting edema     PONV (postoperative nausea and vomiting)     Renal stones     history of renal stones    Sleep apnea     cpap    Thyroid disease     Warts, genital        Past Surgical History:   Procedure Laterality Date    CHOLECYSTECTOMY      HERNIA REPAIR  05/02/2017    HERNIA REPAIR N/A 5/2/2017    HERNIA INCISIONAL REPAIR LAPAROSCOPIC ROBOTIC , lysis of adhensions performed by Maday Perez MD at [address]      Allergies   Allergen Reactions    Ioxaglate Anaphylaxis    Iv Dye [Iodides] Anaphylaxis    Loratadine      Legs jump     Claritin-D 12 Hour [Loratadine-Pseudoephedrine Er]     Prochlorperazine Edisylate      Compazine       MEDICATIONS:      Current Outpatient Prescriptions on File Prior to Visit   Medication Sig Dispense Refill    gabapentin (NEURONTIN) 300 MG capsule TAKE 1 CAPSULE THREE TIMES A DAY 90 capsule 0    omeprazole (PRILOSEC) 40 MG delayed release capsule TAKE 1 CAPSULE DAILY 30 capsule 0    levothyroxine (SYNTHROID) 150 MCG tablet TAKE 1 TABLET DAILY 30 tablet 0    cyclobenzaprine (FLEXERIL) 5 MG tablet TAKE 1 TABLET TWICE A DAY AS NEEDED FOR MUSCLE SPASMS 60 tablet 0    meloxicam (MOBIC) 15 MG tablet TAKE 1 TABLET DAILY AS NEEDED 30 tablet 0    lisinopril (PRINIVIL;ZESTRIL) 40 MG tablet TAKE 1 TABLET DAILY 30 tablet 0    atenolol (TENORMIN) 50 MG tablet TAKE 1 TABLET DAILY 30 tablet 0    metFORMIN (GLUCOPHAGE) 1000 MG tablet TAKE 1 TABLET TWICE A DAY WITH MEALS 60 tablet 0    tiotropium (SPIRIVA HANDIHALER) 18 MCG inhalation capsule Inhale 1 capsule into the lungs daily 30 capsule 3    Fluticasone Furoate-Vilanterol (BREO ELLIPTA) 200-25 MCG/INH AEPB Inhale 1 puff into the lungs daily 4 each 2    Selenium Sulf-Pyrithione-Urea 2.25 % SHAM Apply 1 applicator topically Twice a Week 1 Bottle 1    atorvastatin (LIPITOR) 40 MG has quit smoking. She quit after 25.00 years of use. She has never used smokeless tobacco.    FAMILY HISTORY:    Reviewed and No change from previous visit    HEALTH MAINTENANCE DUE:      Health Maintenance Due   Topic Date Due    Diabetic foot exam  11/03/2017    Diabetic microalbuminuria test  11/03/2017       REVIEW OF SYSTEMS:    12 point review of symptoms completed and found to be normal except noted in the HPI    Review of Systems   Constitutional: Negative for fever, malaise/fatigue and weight loss. Eyes: Negative for blurred vision and pain. Cardiovascular: Negative for chest pain, palpitations and leg swelling. Gastrointestinal: Negative for abdominal pain, blood in stool, constipation, heartburn and nausea. Musculoskeletal: Positive for back pain, joint pain and myalgias. Negative for falls. Neurological: Positive for sensory change. Negative for dizziness, focal weakness and loss of consciousness. Endo/Heme/Allergies: Negative for polydipsia. Does not bruise/bleed easily. Psychiatric/Behavioral: Positive for depression. Negative for substance abuse and suicidal ideas. The patient is nervous/anxious. PHYSICAL EXAM:      Vitals:    11/10/17 1313   BP: 122/83   Site: Right Arm   Position: Sitting   Cuff Size: Large Adult   Pulse: 67   SpO2: 100%   Weight: 277 lb (125.6 kg)   Height: 5' 4.5\" (1.638 m)     Body mass index is 46.81 kg/m². BP Readings from Last 3 Encounters:   11/10/17 122/83   10/24/17 133/85   09/28/17 128/75        Wt Readings from Last 3 Encounters:   11/10/17 277 lb (125.6 kg)   10/24/17 271 lb (122.9 kg)   09/28/17 272 lb 4.3 oz (123.5 kg)       Physical Exam   Constitutional: She is oriented to person, place, and time and well-developed, well-nourished, and in no distress. No distress. HENT:   Head: Normocephalic and atraumatic. Eyes: Conjunctivae and EOM are normal. Pupils are equal, round, and reactive to light. No scleral icterus.    Neck: Normal range of

## 2017-11-10 NOTE — PROGRESS NOTES
Visit Information    Have you changed or started any medications since your last visit including any over-the-counter medicines, vitamins, or herbal medicines? no   Are you having any side effects from any of your medications? -  no  Have you stopped taking any of your medications? Is so, why? -  no    Have you seen any other physician or provider since your last visit? No  Have you had any other diagnostic tests since your last visit? No  Have you been seen in the emergency room and/or had an admission to a hospital since we last saw you? No  Have you had your routine dental cleaning in the past 6 months? no    Have you activated your Stribe account? If not, what are your barriers?  No: declined     Patient Care Team:  Oralia Aase, MD as PCP - General    Medical History Review  Past Medical, Family, and Social History reviewed and does not contribute to the patient presenting condition    Health Maintenance   Topic Date Due    Diabetic foot exam  11/03/2017    Diabetic microalbuminuria test  11/03/2017    Diabetic retinal exam  11/23/2017 (Originally 12/8/2016)    Cervical cancer screen  04/24/2018    Lipid screen  07/20/2018    Diabetic hemoglobin A1C test  10/24/2018    DTaP/Tdap/Td vaccine (2 - Td) 05/27/2024    Flu vaccine  Completed    Pneumococcal med risk  Completed    HIV screen  Completed

## 2017-11-12 ASSESSMENT — ENCOUNTER SYMPTOMS
BLURRED VISION: 0
EYE PAIN: 0
BLOOD IN STOOL: 0
CONSTIPATION: 0
NAUSEA: 0
HEARTBURN: 0
ABDOMINAL PAIN: 0

## 2017-11-14 ENCOUNTER — HOSPITAL ENCOUNTER (OUTPATIENT)
Dept: GENERAL RADIOLOGY | Age: 47
Discharge: HOME | End: 2017-11-14
Payer: COMMERCIAL

## 2017-11-14 ENCOUNTER — HOSPITAL ENCOUNTER (OUTPATIENT)
Age: 47
Discharge: HOME | End: 2017-11-14
Payer: COMMERCIAL

## 2017-11-14 DIAGNOSIS — G89.29 CHRONIC BILATERAL LOW BACK PAIN WITHOUT SCIATICA: ICD-10-CM

## 2017-11-14 DIAGNOSIS — M54.50 CHRONIC BILATERAL LOW BACK PAIN WITHOUT SCIATICA: ICD-10-CM

## 2017-11-14 DIAGNOSIS — M25.562 ACUTE PAIN OF LEFT KNEE: ICD-10-CM
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
## 2017-11-14 DIAGNOSIS — M17.0 PRIMARY OSTEOARTHRITIS OF BOTH KNEES: ICD-10-CM

## 2017-11-14 PROCEDURE — 73562 X-RAY EXAM OF KNEE 3: CPT

## 2017-11-14 PROCEDURE — 72100 X-RAY EXAM L-S SPINE 2/3 VWS: CPT

## 2017-11-15 ENCOUNTER — TELEPHONE (OUTPATIENT)
Dept: INTERNAL MEDICINE | Age: 47
End: 2017-11-15

## 2017-11-15 NOTE — TELEPHONE ENCOUNTER
Patient is requesting the results of      X-Rays    This was done on 11/15/17   Test were preformed at [address]: please call pt back as soon as possible pt states she is in a lot of pain.

## 2017-11-17 ENCOUNTER — OFFICE VISIT (OUTPATIENT)
Dept: INTERNAL MEDICINE | Age: 47
End: 2017-11-17
Payer: COMMERCIAL

## 2017-11-17 ENCOUNTER — HOSPITAL ENCOUNTER (OUTPATIENT)
Age: 47
Setting detail: SPECIMEN
Discharge: HOME | End: 2017-11-17
Payer: COMMERCIAL

## 2017-11-17 VITALS
BODY MASS INDEX: 44.95 KG/M2 | SYSTOLIC BLOOD PRESSURE: 122 MMHG | HEART RATE: 101 BPM | DIASTOLIC BLOOD PRESSURE: 81 MMHG | WEIGHT: 266 LBS

## 2017-11-17 DIAGNOSIS — R30.0 DYSURIA: ICD-10-CM

## 2017-11-17 DIAGNOSIS — E11.65 TYPE 2 DIABETES MELLITUS WITH HYPERGLYCEMIA, WITH LONG-TERM CURRENT USE OF INSULIN (HCC): Chronic | ICD-10-CM

## 2017-11-17 DIAGNOSIS — M25.562 CHRONIC PAIN OF LEFT KNEE: Primary | ICD-10-CM

## 2017-11-17 DIAGNOSIS — Z79.4 TYPE 2 DIABETES MELLITUS WITH HYPERGLYCEMIA, WITH LONG-TERM CURRENT USE OF INSULIN (HCC): Chronic | ICD-10-CM

## 2017-11-17 DIAGNOSIS — G89.29 CHRONIC PAIN OF LEFT KNEE: Primary | ICD-10-CM

## 2017-11-17 DIAGNOSIS — M47.817 LUMBOSACRAL SPONDYLOSIS WITHOUT MYELOPATHY: ICD-10-CM

## 2017-11-17 LAB
CREATININE URINE: 138.9 MG/DL (ref 28–217)
MICROALBUMIN/CREAT 24H UR: 38 MG/L
MICROALBUMIN/CREAT UR-RTO: 27 MCG/MG CREAT

## 2017-11-17 PROCEDURE — 99213 OFFICE O/P EST LOW 20 MIN: CPT | Performed by: INTERNAL MEDICINE

## 2017-11-17 PROCEDURE — 81003 URINALYSIS AUTO W/O SCOPE: CPT | Performed by: INTERNAL MEDICINE

## 2017-11-17 NOTE — PATIENT INSTRUCTIONS
RTC on 1/25/18 Printed script for knee brace given to pt. Medications e-scribe to pharmacy of pt's choice. Referral for Physical Therapy given to pt along with some locations, pt will have to chose location and make their own appt. Please take Referral to appt. Specimens sent to Lab for further testing. An After Visit Summary was printed and given to the patient.  ARDEN

## 2017-11-21 ENCOUNTER — TELEPHONE (OUTPATIENT)
Dept: INTERNAL MEDICINE | Age: 47
End: 2017-11-21

## 2017-11-21 RX ORDER — AMOXICILLIN 500 MG/1
500 CAPSULE ORAL 3 TIMES DAILY
Qty: 21 CAPSULE | Refills: 0 | Status: SHIPPED | OUTPATIENT
Start: 2017-11-21 | End: 2017-11-28

## 2017-11-21 NOTE — TELEPHONE ENCOUNTER
I have sent a prescription for amoxicillin to her pharmacy  But please call the patient, and tell her not to take it right away since the symptoms are only present for a day.   She should take it if the symptoms are persistent even after 4- 5 days

## 2017-11-28 DIAGNOSIS — I10 ESSENTIAL HYPERTENSION: Chronic | ICD-10-CM

## 2017-11-28 DIAGNOSIS — Z79.4 TYPE 2 DIABETES MELLITUS WITH COMPLICATION, WITH LONG-TERM CURRENT USE OF INSULIN (HCC): ICD-10-CM

## 2017-11-28 DIAGNOSIS — M79.674 PAIN OF TOE OF RIGHT FOOT: ICD-10-CM

## 2017-11-28 DIAGNOSIS — E11.8 TYPE 2 DIABETES MELLITUS WITH COMPLICATION, WITH LONG-TERM CURRENT USE OF INSULIN (HCC): ICD-10-CM

## 2017-11-28 DIAGNOSIS — R60.0 BILATERAL EDEMA OF LOWER EXTREMITY: ICD-10-CM

## 2017-11-28 RX ORDER — OMEPRAZOLE 40 MG/1
CAPSULE, DELAYED RELEASE ORAL
Qty: 30 CAPSULE | Refills: 2 | Status: SHIPPED | OUTPATIENT
Start: 2017-11-28 | End: 2018-02-15 | Stop reason: SDUPTHER

## 2017-11-28 RX ORDER — GABAPENTIN 300 MG/1
CAPSULE ORAL
Qty: 90 CAPSULE | Refills: 2 | Status: SHIPPED | OUTPATIENT
Start: 2017-11-28 | End: 2018-02-15 | Stop reason: SDUPTHER

## 2017-11-28 RX ORDER — LISINOPRIL 40 MG/1
TABLET ORAL
Qty: 30 TABLET | Refills: 2 | Status: SHIPPED | OUTPATIENT
Start: 2017-11-28 | End: 2018-02-15 | Stop reason: SDUPTHER

## 2017-11-28 RX ORDER — LEVOTHYROXINE SODIUM 0.15 MG/1
TABLET ORAL
Qty: 30 TABLET | Refills: 2 | Status: SHIPPED | OUTPATIENT
Start: 2017-11-28 | End: 2018-02-15 | Stop reason: SDUPTHER

## 2017-11-28 RX ORDER — CYCLOBENZAPRINE HCL 5 MG
TABLET ORAL
Qty: 60 TABLET | Refills: 3 | Status: SHIPPED | OUTPATIENT
Start: 2017-11-28 | End: 2018-03-16 | Stop reason: SDUPTHER

## 2017-11-28 RX ORDER — FUROSEMIDE 20 MG/1
TABLET ORAL
Qty: 60 TABLET | Refills: 2 | Status: SHIPPED | OUTPATIENT
Start: 2017-11-28 | End: 2018-02-16 | Stop reason: SDUPTHER

## 2017-11-28 RX ORDER — ATENOLOL 50 MG/1
TABLET ORAL
Qty: 30 TABLET | Refills: 2 | Status: SHIPPED | OUTPATIENT
Start: 2017-11-28 | End: 2018-02-15 | Stop reason: SDUPTHER

## 2017-11-28 RX ORDER — MELOXICAM 15 MG/1
TABLET ORAL
Qty: 30 TABLET | Refills: 2 | Status: SHIPPED | OUTPATIENT
Start: 2017-11-28 | End: 2018-02-15 | Stop reason: SDUPTHER

## 2017-12-22 DIAGNOSIS — E78.5 DYSLIPIDEMIA: Chronic | ICD-10-CM

## 2017-12-22 DIAGNOSIS — Z79.4 TYPE 2 DIABETES MELLITUS WITH COMPLICATION, WITH LONG-TERM CURRENT USE OF INSULIN (HCC): ICD-10-CM

## 2017-12-22 DIAGNOSIS — E11.8 TYPE 2 DIABETES MELLITUS WITH COMPLICATION, WITH LONG-TERM CURRENT USE OF INSULIN (HCC): ICD-10-CM

## 2017-12-22 DIAGNOSIS — R60.0 BILATERAL EDEMA OF LOWER EXTREMITY: ICD-10-CM

## 2017-12-22 RX ORDER — POTASSIUM CHLORIDE 750 MG/1
CAPSULE, EXTENDED RELEASE ORAL
Qty: 28 CAPSULE | Refills: 0 | Status: SHIPPED | OUTPATIENT
Start: 2017-12-22 | End: 2018-01-19 | Stop reason: SDUPTHER

## 2017-12-22 RX ORDER — ATORVASTATIN CALCIUM 40 MG/1
TABLET, FILM COATED ORAL
Qty: 30 TABLET | Refills: 0 | Status: SHIPPED | OUTPATIENT
Start: 2017-12-22 | End: 2018-01-19 | Stop reason: SDUPTHER

## 2017-12-22 RX ORDER — ASPIRIN 81 MG/1
TABLET ORAL
Qty: 30 TABLET | Refills: 0 | Status: SHIPPED | OUTPATIENT
Start: 2017-12-22 | End: 2018-01-19 | Stop reason: SDUPTHER

## 2017-12-22 NOTE — TELEPHONE ENCOUNTER
Next Visit Date:  Future Appointments  Date Time Provider Ovidio Vilma   1/25/2018 1:15 PM Robin Perdomo MD [address]   Topic Date Due    Diabetic retinal exam  12/08/2016    Diabetic foot exam  11/03/2017    Cervical cancer screen  04/24/2018    Lipid screen  07/20/2018    Diabetic hemoglobin A1C test  10/24/2018    Diabetic microalbuminuria test  11/17/2018    DTaP/Tdap/Td vaccine (2 - Td) 05/27/2024    Flu vaccine  Completed    Pneumococcal med risk  Completed    HIV screen  Completed       Hemoglobin A1C (%)   Date Value   10/24/2017 7.4   07/20/2017 9.9 (H)   07/19/2017 9.2             ( goal A1C is < 7)   Microalb/Crt.  Ratio (mcg/mg creat)   Date Value   11/17/2017 27 (H)     LDL Cholesterol (mg/dL)   Date Value   07/20/2017 51   07/20/2017 48   07/20/2017 47       (goal LDL is <100)   AST (U/L)   Date Value   07/20/2017 21     ALT (U/L)   Date Value   07/20/2017 19     BUN (mg/dL)   Date Value   07/20/2017 13     BP Readings from Last 3 Encounters:   11/17/17 122/81   11/10/17 122/83   10/24/17 133/85          (goal 120/80)    All Future Testing planned in [facility]  Lab Frequency Next Occurrence   XR KNEE BILATERAL STANDING Once 02/28/2018               Patient Active Problem List:     Hypertension     Diabetes mellitus-  insulin depedent type 2     Dyslipidemia     COPD (chronic obstructive pulmonary disease) (HCC)     DELGADO on CPAP     Arthritis     Bipolar disorder (HCC)     Hypothyroid     Hemorrhoids     Morbid obesity ([address]     Lower extremity edema     Neck pain     Lumbosacral spondylosis without myelopathy     S/P laparoscopic hernia repair

## 2017-12-28 DIAGNOSIS — B37.2 YEAST INFECTION OF THE SKIN: ICD-10-CM

## 2017-12-28 RX ORDER — NYSTATIN 100000 [USP'U]/G
POWDER TOPICAL
Qty: 1 BOTTLE | Refills: 1 | Status: SHIPPED | OUTPATIENT
Start: 2017-12-28 | End: 2018-04-12 | Stop reason: SDUPTHER

## 2018-01-05 ENCOUNTER — OFFICE VISIT (OUTPATIENT)
Dept: INTERNAL MEDICINE | Age: 48
End: 2018-01-05
Payer: COMMERCIAL

## 2018-01-05 VITALS
HEART RATE: 96 BPM | DIASTOLIC BLOOD PRESSURE: 85 MMHG | TEMPERATURE: 98 F | BODY MASS INDEX: 45.63 KG/M2 | SYSTOLIC BLOOD PRESSURE: 152 MMHG | WEIGHT: 270 LBS

## 2018-01-05 DIAGNOSIS — E66.01 MORBID OBESITY WITH BMI OF 45.0-49.9, ADULT (HCC): ICD-10-CM

## 2018-01-05 DIAGNOSIS — F31.9 BIPOLAR AFFECTIVE DISORDER, REMISSION STATUS UNSPECIFIED (HCC): ICD-10-CM

## 2018-01-05 DIAGNOSIS — J44.9 CHRONIC OBSTRUCTIVE PULMONARY DISEASE, UNSPECIFIED COPD TYPE (HCC): ICD-10-CM

## 2018-01-05 DIAGNOSIS — J06.9 UPPER RESPIRATORY TRACT INFECTION, UNSPECIFIED TYPE: Primary | ICD-10-CM

## 2018-01-05 DIAGNOSIS — Z79.4 TYPE 2 DIABETES MELLITUS WITH HYPERGLYCEMIA, WITH LONG-TERM CURRENT USE OF INSULIN (HCC): Chronic | ICD-10-CM

## 2018-01-05 DIAGNOSIS — E11.65 TYPE 2 DIABETES MELLITUS WITH HYPERGLYCEMIA, WITH LONG-TERM CURRENT USE OF INSULIN (HCC): Chronic | ICD-10-CM

## 2018-01-05 LAB — GLUCOSE BLD-MCNC: 358 MG/DL

## 2018-01-05 PROCEDURE — 99213 OFFICE O/P EST LOW 20 MIN: CPT | Performed by: INTERNAL MEDICINE

## 2018-01-05 PROCEDURE — 82962 GLUCOSE BLOOD TEST: CPT | Performed by: INTERNAL MEDICINE

## 2018-01-05 RX ORDER — AZITHROMYCIN 250 MG/1
250 TABLET, FILM COATED ORAL DAILY
Refills: 0 | COMMUNITY
Start: 2018-01-03 | End: 2018-04-12 | Stop reason: ALTCHOICE

## 2018-01-05 RX ORDER — OXYMETAZOLINE HYDROCHLORIDE 0.05 G/100ML
2 SPRAY NASAL 2 TIMES DAILY
Qty: 1 BOTTLE | Refills: 0 | Status: SHIPPED | OUTPATIENT
Start: 2018-01-05 | End: 2018-01-08

## 2018-01-05 RX ORDER — GUAIFENESIN AND DEXTROMETHORPHAN HYDROBROMIDE 400; 20 MG/1; MG/1
1 TABLET ORAL EVERY 4 HOURS PRN
Qty: 42 TABLET | Refills: 0 | Status: SHIPPED | OUTPATIENT
Start: 2018-01-05 | End: 2018-08-20 | Stop reason: ALTCHOICE

## 2018-01-05 RX ORDER — ACETAMINOPHEN 500 MG
500 TABLET ORAL 2 TIMES DAILY PRN
Qty: 60 TABLET | Refills: 0 | Status: SHIPPED | OUTPATIENT
Start: 2018-01-05 | End: 2018-08-20 | Stop reason: ALTCHOICE

## 2018-01-05 RX ORDER — AMOXICILLIN AND CLAVULANATE POTASSIUM 875; 125 MG/1; MG/1
1 TABLET, FILM COATED ORAL 2 TIMES DAILY
Qty: 20 TABLET | Refills: 0 | Status: SHIPPED | OUTPATIENT
Start: 2018-01-05 | End: 2018-01-15

## 2018-01-05 NOTE — PROGRESS NOTES
[facility]  [facility] [address] [facility]  [address]  Dept: [phone]      Today's Date: 1/5/2018  Patient Name: Dread Jason  Patient's age: 56 y.o., 1970        CHIEF COMPLAINT:    Chief Complaint   Patient presents with    Cough    Rash     nose and chest     Chest Congestion       History Obtained From:  patient    HISTORY OF PRESENT ILLNESS:      The patient is a 56 y.o. old  female and is here For new nasal congestion, sore throat and fever. She also has ear pain but no ear discharge. She has been having the symptoms were more than 2 weeks now. She was started on Z-Esau and has taken 3 dosage. She states she has not been feeling better. She has dry mouth also. Her blood sugar is elevated and is 350. She has not been taking medication as she is not feeling well. She has cough however denies any wheezing or shortness of breath. Her blood pressure is also elevated as she is not taking her medication. Patient Active Problem List   Diagnosis    Hypertension    Diabetes mellitus-  insulin depedent type 2    Dyslipidemia    COPD (chronic obstructive pulmonary disease) ([address]    DELGADO on CPAP    Arthritis    Bipolar disorder ([address]    Hypothyroid    Hemorrhoids    Morbid obesity ([address]    Lower extremity edema    Neck pain    Lumbosacral spondylosis without myelopathy    S/P laparoscopic hernia repair       Past Medical History:   has a past medical history of Arthritis; Back pain; Bipolar 1 disorder ([address] COPD (chronic obstructive pulmonary disease) ([address] Depression; Diabetes mellitus ([address] GERD (gastroesophageal reflux disease); Glaucoma; Hx of blood clots; Hyperlipidemia; Hypertension; Morbid obesity ([address] On home oxygen therapy; Pitting edema; PONV (postoperative nausea and vomiting); Renal stones; Sleep apnea;  Thyroid disease; and Warts, genital.    Past Surgical History:   has a past surgical history that Constitutional: Positive for fever and fatigue. HENT: Positive for congestion and sore throat. Eyes: Negative for eye pain and visual disturbance. Respiratory: Negative for chest tightness and shortness of breath. Cardiovascular: Negative for chest pain and orthopnea . Gastrointestinal: Negative for vomiting, abdominal pain, constipation and diarrhea. Endocrine: Negative for cold intolerance, heat intolerance, polydipsia and polyuria. Genitourinary: Negative for dysuria and frequency. Musculoskeletal: Negative for  Myalgia, . Neurological: Negative for dizziness, weakness. PHYSICAL EXAM:        BP (!) 152/85 (Site: Right Arm, Position: Sitting, Cuff Size: Medium Adult)   Pulse 96   Temp 98 °F (36.7 °C)   Wt 270 lb (122.5 kg)   BMI 45.63 kg/m²      General appearance - well appearing, not in  distress. Mental status - alert and cooperative . Head: Normocephalic, without obvious abnormality, atraumatic. Eye: PERRL, conjunctiva/corneas clear, EOM's intact. Nose:Nares normal, septum midline, mucosa normal, no drainage   Throat: No pharyngeal erythema or exudates. Neck - Supple, no significant adenopathy . Chest - Clear to auscultation, no wheezes, rales or rhonchi, symmetric air entry. Heart -  regular rhythm, normal S1, S2, no murmurs. Abdomen - Soft, nontender, nondistended, no masses or organomegaly. Neurological - Alert, oriented, normal speech, no focal findings or movement disorder noted. Musculoskeletal - No joint tenderness, deformity or swelling. Extremities -  No pedal edema, no clubbing or cyanosis.         Labs:       Chemistry        Component Value Date/Time     07/20/2017 1150    K 4.7 07/20/2017 1150    CL 96 (L) 07/20/2017 1150    CO2 27 07/20/2017 1150    BUN 13 07/20/2017 1150    CREATININE 0.49 (L) 07/20/2017 1150        Component Value Date/Time    CALCIUM 9.8 07/20/2017 1150    ALKPHOS 79 07/20/2017 1150    AST 21 07/20/2017 1150    ALT 19

## 2018-01-08 ENCOUNTER — HOSPITAL ENCOUNTER (EMERGENCY)
Age: 48
Discharge: HOME | End: 2018-01-09
Attending: EMERGENCY MEDICINE
Payer: COMMERCIAL

## 2018-01-08 ENCOUNTER — APPOINTMENT (OUTPATIENT)
Dept: GENERAL RADIOLOGY | Age: 48
End: 2018-01-08
Payer: COMMERCIAL

## 2018-01-08 VITALS
OXYGEN SATURATION: 96 % | HEART RATE: 82 BPM | BODY MASS INDEX: 45.63 KG/M2 | SYSTOLIC BLOOD PRESSURE: 115 MMHG | WEIGHT: 270 LBS | TEMPERATURE: 97.5 F | DIASTOLIC BLOOD PRESSURE: 79 MMHG | RESPIRATION RATE: 18 BRPM

## 2018-01-08 DIAGNOSIS — B34.9 VIRAL ILLNESS: Primary | ICD-10-CM

## 2018-01-08 PROCEDURE — 99283 EMERGENCY DEPT VISIT LOW MDM: CPT

## 2018-01-08 ASSESSMENT — PAIN DESCRIPTION - LOCATION: LOCATION: GENERALIZED

## 2018-01-08 ASSESSMENT — PAIN DESCRIPTION - PAIN TYPE: TYPE: ACUTE PAIN

## 2018-01-08 ASSESSMENT — PAIN SCALES - GENERAL: PAINLEVEL_OUTOF10: 4

## 2018-01-09 ENCOUNTER — APPOINTMENT (OUTPATIENT)
Dept: GENERAL RADIOLOGY | Age: 48
End: 2018-01-09
Payer: COMMERCIAL

## 2018-01-09 PROCEDURE — 71045 X-RAY EXAM CHEST 1 VIEW: CPT

## 2018-01-09 ASSESSMENT — ENCOUNTER SYMPTOMS
COUGH: 0
VOMITING: 0
RHINORRHEA: 1
ABDOMINAL PAIN: 0
SHORTNESS OF BREATH: 0
TROUBLE SWALLOWING: 0
CHEST TIGHTNESS: 0
SORE THROAT: 1
NAUSEA: 0
BACK PAIN: 0
PHOTOPHOBIA: 0
WHEEZING: 0

## 2018-01-09 NOTE — ED PROVIDER NOTES
CONTINUE MEDS AS RX'ED. F/U WITH DR. ORTEGA-CALL IN AM. RETURN IF SX WORSEN OR PROGRESS            Jeronimo Gallego MD  01/09/18 9513

## 2018-01-09 NOTE — ED PROVIDER NOTES
(FLEXERIL) 5 MG tablet TAKE 1 TABLET TWICE A DAY AS NEEDED FOR MUSCLE SPASMS 11/28/17   Rk Sanchez MD   furosemide (LASIX) 20 MG tablet TAKE 1 TABLET DAILY 11/28/17   Rk Sanchez MD   gabapentin (NEURONTIN) 300 MG capsule TAKE 1 CAPSULE THREE TIMES A DAY 11/28/17   Rk Sanchez MD   levothyroxine (SYNTHROID) 150 MCG tablet TAKE 1 TABLET DAILY 11/28/17   Rk Sanchez MD   lisinopril (PRINIVIL;ZESTRIL) 40 MG tablet TAKE 1 TABLET DAILY 11/28/17   Rk Sanchez MD   meloxicam (MOBIC) 15 MG tablet TAKE 1 TABLET DAILY AS NEEDED 11/28/17   Rk Sanchez MD   metFORMIN (GLUCOPHAGE) 1000 MG tablet TAKE 1 TABLET TWICE A DAY WITH MEALS 11/28/17   Rk Sanchez MD   diclofenac sodium (VOLTAREN) 1 % GEL Apply 2 g topically 2 times daily 11/17/17   Rk Sanchez MD   Elastic Bandages & Supports (KNEE BRACE) MISC 1 each by Does not apply route daily 11/17/17   Rk Sanchez MD   tiotropium ([place]) 18 MCG inhalation capsule Inhale 1 capsule into the lungs daily 10/24/17   Rk Sanchez MD   Fluticasone Furoate-Vilanterol (BREO ELLIPTA) 200-25 MCG/INH AEPB Inhale 1 puff into the lungs daily 10/24/17   Rk Sanchez MD   Selenium Sulf-Pyrithione-Urea 2.25 % SHAM Apply 1 applicator topically Twice a Week 10/26/17   Rk Sanchez MD   NITROSTAT 0.4 MG SL tablet TAKE 1 TABLET UNDER THE TONGUE EVERY 5 MINUTES AS NEEDED FOR CHESTPAIN 7/31/17   Rk Sanchez MD   glimepiride (AMARYL) 4 MG tablet Take 1 tablet by mouth every morning (before breakfast) 5/25/17   MD Anson Wright MISC Use to check sugars daily. 5/12/17   Kameron Morrissey MD   ketoconazole (NIZORAL) 2 % cream Apply topically daily.  4/17/17   Rk Sanchez MD   temazepam (RESTORIL) 15 MG capsule Take 1 capsule by mouth nightly as needed for Sleep 4/5/17   Winston Knox MD   insulin detemir (LEVEMIR FLEXTOUCH) 100 UNIT/ML injection pen Inject 100 Units into the skin 2 times daily Discontinue Lantus 3/3/17   Gelacio Matt MD   clonazePAM (KLONOPIN) 0.5 MG tablet Take 0.5 mg by mouth nightly as needed  2/2/17   Historical Provider, MD   DULoxetine (CYMBALTA) 60 MG extended release capsule Take 60 mg by mouth daily  1/26/17   Historical Provider, MD [place] 0.01 % SOLN ophthalmic drops Place 1 drop into both eyes nightly  12/22/16   Historical Provider, MD   albuterol (PROVENTIL) (2.5 MG/3ML) 0.083% nebulizer solution Take 3 mLs by nebulization every 6 hours as needed for Wheezing 8/8/16   Marielle Mendez MD   LATUDA 80 MG TABS tablet Take 80 mg by mouth daily  3/3/16   Historical Provider, MD   nystatin (MYCOSTATIN) 789849 UNIT/GM cream Apply topically 2 times daily. 5/6/16   Mojgan Hassan MD   sertraline (ZOLOFT) 100 MG tablet Take 1 tablet by mouth nightly 2/26/16   Historical Provider, MD   traZODone (DESYREL) 100 MG tablet Take 1 tablet by mouth nightly 2/26/16   Historical Provider, MD   OLANZapine (ZYPREXA) 10 MG tablet TAKE 1 TABLET AT BEDTIME. 1/8/15   Tonya Liu MD       REVIEW OF SYSTEMS    (2-9 systems for level 4, 10 or more for level 5)      Review of Systems   Constitutional: Negative for chills and fever. HENT: Positive for congestion, rhinorrhea and sore throat. Negative for trouble swallowing. Eyes: Negative for photophobia. Respiratory: Negative for cough, chest tightness, shortness of breath and wheezing. Cardiovascular: Negative for chest pain and palpitations. Gastrointestinal: Negative for abdominal pain, nausea and vomiting. Endocrine: Negative for polyuria. Genitourinary: Negative for dysuria and flank pain. Musculoskeletal: Positive for myalgias. Negative for back pain and neck pain. Skin: Negative for rash and wound. Neurological: Negative for syncope, weakness, light-headedness and headaches. Psychiatric/Behavioral: Negative for agitation and confusion.        PHYSICAL EXAM   (up to 7 for level 4, 8 or more for level 5)      INITIAL

## 2018-01-19 DIAGNOSIS — Z79.4 TYPE 2 DIABETES MELLITUS WITH COMPLICATION, WITH LONG-TERM CURRENT USE OF INSULIN (HCC): ICD-10-CM

## 2018-01-19 DIAGNOSIS — E11.8 TYPE 2 DIABETES MELLITUS WITH COMPLICATION, WITH LONG-TERM CURRENT USE OF INSULIN (HCC): ICD-10-CM

## 2018-01-19 DIAGNOSIS — R60.0 BILATERAL EDEMA OF LOWER EXTREMITY: ICD-10-CM

## 2018-01-19 DIAGNOSIS — E78.5 DYSLIPIDEMIA: Chronic | ICD-10-CM

## 2018-01-19 RX ORDER — ATORVASTATIN CALCIUM 40 MG/1
TABLET, FILM COATED ORAL
Qty: 30 TABLET | Refills: 0 | Status: SHIPPED | OUTPATIENT
Start: 2018-01-19 | End: 2018-02-15 | Stop reason: SDUPTHER

## 2018-01-19 RX ORDER — POTASSIUM CHLORIDE 750 MG/1
CAPSULE, EXTENDED RELEASE ORAL
Qty: 28 CAPSULE | Refills: 0 | Status: SHIPPED | OUTPATIENT
Start: 2018-01-19 | End: 2018-02-15 | Stop reason: SDUPTHER

## 2018-01-19 RX ORDER — ASPIRIN 81 MG/1
TABLET ORAL
Qty: 30 TABLET | Refills: 0 | Status: SHIPPED | OUTPATIENT
Start: 2018-01-19 | End: 2018-02-15 | Stop reason: SDUPTHER

## 2018-01-19 NOTE — TELEPHONE ENCOUNTER
E-Scribe request for POTASSIUM CHLORIDE ER 10MEQ CAPS, Atorvastatin, Aspirin. Pt last seen 1/5/18    Next Visit Date:  Future Appointments  Date Time Provider Ovidio Esparza   1/25/2018 1:15 PM Faviola Sanchez MD [address] [facility]   Topic Date Due    Diabetic retinal exam  12/08/2016    Diabetic foot exam  11/03/2017    Cervical cancer screen  04/24/2018    Lipid screen  07/20/2018    TSH testing  07/20/2018    Potassium monitoring  07/20/2018    Creatinine monitoring  07/20/2018    A1C test (Diabetic or Prediabetic)  10/24/2018    Diabetic microalbuminuria test  11/17/2018    DTaP/Tdap/Td vaccine (2 - Td) 05/27/2024    Flu vaccine  Completed    Pneumococcal med risk  Completed    HIV screen  Completed             (applicable per patient's age: Cancer Screenings, Depression Screening, Fall Risk Screening, Immunizations)    Hemoglobin A1C (%)   Date Value   10/24/2017 7.4   07/20/2017 9.9 (H)   07/19/2017 9.2     Microalb/Crt.  Ratio (mcg/mg creat)   Date Value   11/17/2017 27 (H)     LDL Cholesterol (mg/dL)   Date Value   07/20/2017 51   07/20/2017 48   07/20/2017 47     AST (U/L)   Date Value   07/20/2017 21     ALT (U/L)   Date Value   07/20/2017 19     BUN (mg/dL)   Date Value   07/20/2017 13      (goal A1C is < 7)   (goal LDL is <100) need 30-50% reduction from baseline     BP Readings from Last 3 Encounters:   01/08/18 115/79   01/05/18 (!) 152/85   11/17/17 122/81    (goal /80)      All Future Testing planned in [facility]:  Lab Frequency Next Occurrence   XR KNEE BILATERAL STANDING Once 02/28/2018            Patient Active Problem List:     Hypertension     Diabetes mellitus-  insulin depedent type 2     Dyslipidemia     COPD (chronic obstructive pulmonary disease) (HCC)     DELGADO on CPAP     Arthritis     Bipolar disorder (HCC)     Hypothyroid     Hemorrhoids     Morbid obesity ([address]     Lower extremity edema     Neck pain     Lumbosacral spondylosis without myelopathy     S/P laparoscopic hernia repair

## 2018-01-25 ENCOUNTER — TELEPHONE (OUTPATIENT)
Dept: INTERNAL MEDICINE | Age: 48
End: 2018-01-25

## 2018-01-25 NOTE — TELEPHONE ENCOUNTER
PC from [facility] stating that Spiriva isn't covered and will not be filled again next time the hector tricaterina. States that [employer] is formulary now. We can either send a script of that over to [address] [place] [address] or contact them to set up a PA at [phone]. Or contact [address] [place] [address] at [phone] to work on getting a Formulary Exception in place.

## 2018-02-06 DIAGNOSIS — J42 CHRONIC BRONCHITIS, UNSPECIFIED CHRONIC BRONCHITIS TYPE (HCC): Primary | Chronic | ICD-10-CM

## 2018-02-06 NOTE — TELEPHONE ENCOUNTER
Form received from [address] [place] [address]  stating Vupen is no longer formulary and 30 day supply patrick be provided  Form scanned into media  Please advise

## 2018-02-08 ENCOUNTER — OFFICE VISIT (OUTPATIENT)
Dept: INTERNAL MEDICINE | Age: 48
End: 2018-02-08
Payer: COMMERCIAL

## 2018-02-08 VITALS
WEIGHT: 262 LBS | DIASTOLIC BLOOD PRESSURE: 80 MMHG | BODY MASS INDEX: 44.28 KG/M2 | SYSTOLIC BLOOD PRESSURE: 127 MMHG | HEART RATE: 65 BPM

## 2018-02-08 DIAGNOSIS — Z99.89 OSA ON CPAP: ICD-10-CM

## 2018-02-08 DIAGNOSIS — G47.33 OSA ON CPAP: ICD-10-CM

## 2018-02-08 DIAGNOSIS — R07.9 CHEST PAIN, UNSPECIFIED TYPE: ICD-10-CM

## 2018-02-08 DIAGNOSIS — E66.01 MORBID OBESITY WITH BMI OF 45.0-49.9, ADULT (HCC): ICD-10-CM

## 2018-02-08 DIAGNOSIS — E03.9 HYPOTHYROIDISM, UNSPECIFIED TYPE: ICD-10-CM

## 2018-02-08 DIAGNOSIS — E11.65 TYPE 2 DIABETES MELLITUS WITH HYPERGLYCEMIA, WITH LONG-TERM CURRENT USE OF INSULIN (HCC): Primary | Chronic | ICD-10-CM

## 2018-02-08 DIAGNOSIS — Z79.4 TYPE 2 DIABETES MELLITUS WITH HYPERGLYCEMIA, WITH LONG-TERM CURRENT USE OF INSULIN (HCC): Primary | Chronic | ICD-10-CM

## 2018-02-08 LAB — HBA1C MFR BLD: 7.4 %

## 2018-02-08 PROCEDURE — 99213 OFFICE O/P EST LOW 20 MIN: CPT | Performed by: STUDENT IN AN ORGANIZED HEALTH CARE EDUCATION/TRAINING PROGRAM

## 2018-02-08 PROCEDURE — 83036 HEMOGLOBIN GLYCOSYLATED A1C: CPT | Performed by: INTERNAL MEDICINE

## 2018-02-08 NOTE — PROGRESS NOTES
Anaphylaxis    Loratadine      Legs jump     Claritin-D 12 Hour [Loratadine-Pseudoephedrine Er]     Prochlorperazine Edisylate      Compazine         MEDICATIONS:      Current Outpatient Prescriptions on File Prior to Visit   Medication Sig Dispense Refill    Umeclidinium Bromide (INCRUSE ELLIPTA) 62.5 MCG/INH AEPB Inhale 1 puff into the lungs daily 1 each 2    Umeclidinium Bromide (INCRUSE ELLIPTA) 62.5 MCG/INH AEPB Inhale 1 Inhaler into the lungs daily 1 each 2    potassium chloride (MICRO-K) 10 MEQ extended release capsule TAKE 1 CAPSULE DAILY 28 capsule 0    atorvastatin (LIPITOR) 40 MG tablet TAKE 1 TABLET DAILY 30 tablet 0    ASPIR-LOW 81 MG EC tablet TAKE 1 TABLET DAILY 30 tablet 0    azithromycin (ZITHROMAX) 250 MG tablet Take 250 mg by mouth daily  0    dextromethorphan-guaiFENesin  MG TABS Take 1 tablet by mouth every 4 hours as needed (cough) 42 tablet 0    acetaminophen (TYLENOL) 500 MG tablet Take 1 tablet by mouth 2 times daily as needed for Pain 60 tablet 0    nystatin (MYCOSTATIN) 329191 UNIT/GM powder Apply 3 times daily.  1 Bottle 1    TRUE METRIX BLOOD GLUCOSE TEST strip USE AS DIRECTED DAILY AS NEEDED 100 each 5    B-D UF III MINI PEN NEEDLES 31G X 5 MM MISC USE AS DIRECTED DAILY 100 each 5    omeprazole (PRILOSEC) 40 MG delayed release capsule TAKE 1 CAPSULE DAILY 30 capsule 2    atenolol (TENORMIN) 50 MG tablet TAKE 1 TABLET DAILY 30 tablet 2    cyclobenzaprine (FLEXERIL) 5 MG tablet TAKE 1 TABLET TWICE A DAY AS NEEDED FOR MUSCLE SPASMS 60 tablet 3    furosemide (LASIX) 20 MG tablet TAKE 1 TABLET DAILY 60 tablet 2    gabapentin (NEURONTIN) 300 MG capsule TAKE 1 CAPSULE THREE TIMES A DAY 90 capsule 2    levothyroxine (SYNTHROID) 150 MCG tablet TAKE 1 TABLET DAILY 30 tablet 2    lisinopril (PRINIVIL;ZESTRIL) 40 MG tablet TAKE 1 TABLET DAILY 30 tablet 2    meloxicam (MOBIC) 15 MG tablet TAKE 1 TABLET DAILY AS NEEDED 30 tablet 2    metFORMIN (GLUCOPHAGE) 1000 MG tablet

## 2018-02-12 ENCOUNTER — TELEPHONE (OUTPATIENT)
Dept: INTERNAL MEDICINE | Age: 48
End: 2018-02-12

## 2018-02-27 ENCOUNTER — TELEPHONE (OUTPATIENT)
Dept: INTERNAL MEDICINE | Age: 48
End: 2018-02-27

## 2018-02-27 DIAGNOSIS — N64.4 PAIN OF BOTH BREASTS: ICD-10-CM

## 2018-02-27 DIAGNOSIS — Z12.31 ENCOUNTER FOR SCREENING MAMMOGRAM FOR BREAST CANCER: Primary | ICD-10-CM

## 2018-02-28 ENCOUNTER — TELEPHONE (OUTPATIENT)
Dept: INTERNAL MEDICINE | Age: 48
End: 2018-02-28

## 2018-03-07 ENCOUNTER — HOSPITAL ENCOUNTER (OUTPATIENT)
Dept: ULTRASOUND IMAGING | Age: 48
Discharge: HOME | End: 2018-03-09
Payer: COMMERCIAL

## 2018-03-07 ENCOUNTER — HOSPITAL ENCOUNTER (OUTPATIENT)
Dept: MAMMOGRAPHY | Age: 48
Discharge: HOME | End: 2018-03-09
Payer: COMMERCIAL

## 2018-03-07 DIAGNOSIS — N64.4 PAIN OF BOTH BREASTS: ICD-10-CM

## 2018-03-07 PROCEDURE — 76642 ULTRASOUND BREAST LIMITED: CPT

## 2018-03-07 PROCEDURE — G0279 TOMOSYNTHESIS, MAMMO: HCPCS

## 2018-03-20 ENCOUNTER — TELEPHONE (OUTPATIENT)
Dept: INTERNAL MEDICINE | Age: 48
End: 2018-03-20

## 2018-03-20 RX ORDER — GUAIFENESIN/DEXTROMETHORPHAN 100-10MG/5
5 SYRUP ORAL 3 TIMES DAILY PRN
Qty: 120 ML | Refills: 0 | Status: SHIPPED | OUTPATIENT
Start: 2018-03-20 | End: 2018-03-24 | Stop reason: SDUPTHER

## 2018-03-20 NOTE — TELEPHONE ENCOUNTER
Pt called stated that her COPD is acting up she has a cough,little runny nose, and no fever pt said she didn't try anything over the counter because she doesn't have the money pt wants a script for some cough med sent to [facility]   Topic Date Due    Diabetic retinal exam  12/08/2016    Diabetic foot exam  11/03/2017    Cervical cancer screen  04/24/2018    Lipid screen  07/20/2018    TSH testing  07/20/2018    Potassium monitoring  07/20/2018    Creatinine monitoring  07/20/2018    Diabetic microalbuminuria test  11/17/2018    A1C test (Diabetic or Prediabetic)  02/08/2019    DTaP/Tdap/Td vaccine (2 - Td) 05/27/2024    Flu vaccine  Completed    Pneumococcal med risk  Completed    HIV screen  Completed             (applicable per patient's age: Cancer Screenings, Depression Screening, Fall Risk Screening, Immunizations)    Hemoglobin A1C (%)   Date Value   02/08/2018 7.4   10/24/2017 7.4   07/20/2017 9.9 (H)     Microalb/Crt.  Ratio (mcg/mg creat)   Date Value   11/17/2017 27 (H)     LDL Cholesterol (mg/dL)   Date Value   07/20/2017 51   07/20/2017 48   07/20/2017 47     AST (U/L)   Date Value   07/20/2017 21     ALT (U/L)   Date Value   07/20/2017 19     BUN (mg/dL)   Date Value   07/20/2017 13      (goal A1C is < 7)   (goal LDL is <100) need 30-50% reduction from baseline     BP Readings from Last 3 Encounters:   02/08/18 127/80   01/08/18 115/79   01/05/18 (!) 152/85    (goal /80)      All Future Testing planned in [facility]:  Lab Frequency Next Occurrence   XR KNEE BILATERAL STANDING Once 11/10/2018   FLOWER DIGITAL SCREEN W OR WO CAD BILATERAL Once 05/27/2018       Next Visit Date:  Future Appointments  Date Time Provider Ovidio Esparza   4/10/2018 10:45 AM Lorrie Hawkins MD [id number]            Patient Active Problem List:     Hypertension     Diabetes mellitus-  insulin depedent type 2     Dyslipidemia     COPD (chronic obstructive pulmonary disease) ([facility] [address] DELGADO on CPAP     Arthritis     Bipolar disorder (HCC)     Hypothyroid     Hemorrhoids     Morbid obesity ([address]     Lower extremity edema     Neck pain     Lumbosacral spondylosis without myelopathy     S/P laparoscopic hernia repair

## 2018-03-26 RX ORDER — GUAIFENESIN/DEXTROMETHORPHAN 100-10MG/5
SYRUP ORAL
Qty: 120 ML | Refills: 0 | Status: SHIPPED | OUTPATIENT
Start: 2018-03-26 | End: 2018-08-20 | Stop reason: ALTCHOICE

## 2018-03-26 RX ORDER — BENZONATATE 200 MG/1
CAPSULE ORAL
Qty: 9 CAPSULE | Refills: 0 | Status: SHIPPED | OUTPATIENT
Start: 2018-03-26 | End: 2018-10-29

## 2018-04-12 ENCOUNTER — OFFICE VISIT (OUTPATIENT)
Dept: INTERNAL MEDICINE | Age: 48
End: 2018-04-12
Payer: COMMERCIAL

## 2018-04-12 VITALS
HEART RATE: 67 BPM | DIASTOLIC BLOOD PRESSURE: 78 MMHG | WEIGHT: 276.8 LBS | HEIGHT: 65 IN | BODY MASS INDEX: 46.12 KG/M2 | SYSTOLIC BLOOD PRESSURE: 119 MMHG

## 2018-04-12 DIAGNOSIS — B37.2 CANDIDIASIS, CUTANEOUS: Primary | ICD-10-CM

## 2018-04-12 DIAGNOSIS — Z79.4 TYPE 2 DIABETES MELLITUS WITH HYPERGLYCEMIA, WITH LONG-TERM CURRENT USE OF INSULIN (HCC): Chronic | ICD-10-CM

## 2018-04-12 DIAGNOSIS — E11.65 TYPE 2 DIABETES MELLITUS WITH HYPERGLYCEMIA, WITH LONG-TERM CURRENT USE OF INSULIN (HCC): Chronic | ICD-10-CM

## 2018-04-12 PROCEDURE — 99212 OFFICE O/P EST SF 10 MIN: CPT

## 2018-04-12 PROCEDURE — 99213 OFFICE O/P EST LOW 20 MIN: CPT | Performed by: STUDENT IN AN ORGANIZED HEALTH CARE EDUCATION/TRAINING PROGRAM

## 2018-04-12 RX ORDER — NYSTATIN 100000 [USP'U]/G
POWDER TOPICAL
Qty: 1 BOTTLE | Refills: 1 | Status: SHIPPED | OUTPATIENT
Start: 2018-04-12 | End: 2018-10-29

## 2018-04-12 RX ORDER — BACITRACIN, NEOMYCIN, POLYMYXIN B 400; 3.5; 5 [USP'U]/G; MG/G; [USP'U]/G
OINTMENT TOPICAL 2 TIMES DAILY
Qty: 1 TUBE | Refills: 2 | Status: SHIPPED | OUTPATIENT
Start: 2018-04-12 | End: 2018-10-29

## 2018-05-11 DIAGNOSIS — M79.674 PAIN OF TOE OF RIGHT FOOT: ICD-10-CM

## 2018-05-13 RX ORDER — MELOXICAM 15 MG/1
TABLET ORAL
Qty: 30 TABLET | Refills: 0 | Status: SHIPPED | OUTPATIENT
Start: 2018-05-13 | End: 2018-06-05 | Stop reason: SDUPTHER

## 2018-06-05 DIAGNOSIS — M79.674 PAIN OF TOE OF RIGHT FOOT: ICD-10-CM

## 2018-06-05 DIAGNOSIS — I10 ESSENTIAL HYPERTENSION: Chronic | ICD-10-CM

## 2018-06-05 DIAGNOSIS — R60.0 BILATERAL EDEMA OF LOWER EXTREMITY: ICD-10-CM

## 2018-06-05 DIAGNOSIS — Z79.4 TYPE 2 DIABETES MELLITUS WITH COMPLICATION, WITH LONG-TERM CURRENT USE OF INSULIN (HCC): ICD-10-CM

## 2018-06-05 DIAGNOSIS — E11.8 TYPE 2 DIABETES MELLITUS WITH COMPLICATION, WITH LONG-TERM CURRENT USE OF INSULIN (HCC): ICD-10-CM

## 2018-06-05 RX ORDER — POTASSIUM CHLORIDE 750 MG/1
CAPSULE, EXTENDED RELEASE ORAL
Qty: 28 CAPSULE | Refills: 0 | Status: SHIPPED | OUTPATIENT
Start: 2018-06-05 | End: 2018-07-06 | Stop reason: SDUPTHER

## 2018-06-05 RX ORDER — LEVOTHYROXINE SODIUM 0.15 MG/1
TABLET ORAL
Qty: 30 TABLET | Refills: 0 | Status: SHIPPED | OUTPATIENT
Start: 2018-06-05 | End: 2018-07-06 | Stop reason: SDUPTHER

## 2018-06-05 RX ORDER — GABAPENTIN 300 MG/1
CAPSULE ORAL
Qty: 90 CAPSULE | Refills: 0 | Status: SHIPPED | OUTPATIENT
Start: 2018-06-05 | End: 2018-07-06 | Stop reason: SDUPTHER

## 2018-06-05 RX ORDER — LISINOPRIL 40 MG/1
TABLET ORAL
Qty: 30 TABLET | Refills: 0 | Status: SHIPPED | OUTPATIENT
Start: 2018-06-05 | End: 2018-07-06 | Stop reason: SDUPTHER

## 2018-06-05 RX ORDER — MELOXICAM 15 MG/1
TABLET ORAL
Qty: 30 TABLET | Refills: 0 | Status: SHIPPED | OUTPATIENT
Start: 2018-06-05 | End: 2018-07-06 | Stop reason: SDUPTHER

## 2018-06-20 RX ORDER — PEN NEEDLE, DIABETIC 31 GX5/16"
NEEDLE, DISPOSABLE MISCELLANEOUS
Qty: 100 EACH | Refills: 0 | Status: SHIPPED | OUTPATIENT
Start: 2018-06-20 | End: 2018-07-18 | Stop reason: SDUPTHER

## 2018-07-06 DIAGNOSIS — R60.0 BILATERAL EDEMA OF LOWER EXTREMITY: ICD-10-CM

## 2018-07-06 DIAGNOSIS — I10 ESSENTIAL HYPERTENSION: Chronic | ICD-10-CM

## 2018-07-06 DIAGNOSIS — Z79.4 TYPE 2 DIABETES MELLITUS WITH COMPLICATION, WITH LONG-TERM CURRENT USE OF INSULIN (HCC): ICD-10-CM

## 2018-07-06 DIAGNOSIS — M79.674 PAIN OF TOE OF RIGHT FOOT: ICD-10-CM

## 2018-07-06 DIAGNOSIS — E11.8 TYPE 2 DIABETES MELLITUS WITH COMPLICATION, WITH LONG-TERM CURRENT USE OF INSULIN (HCC): ICD-10-CM

## 2018-07-06 RX ORDER — FUROSEMIDE 20 MG/1
TABLET ORAL
Qty: 60 TABLET | Refills: 0 | Status: SHIPPED | OUTPATIENT
Start: 2018-07-06 | End: 2018-09-28 | Stop reason: SDUPTHER

## 2018-07-06 RX ORDER — GABAPENTIN 300 MG/1
CAPSULE ORAL
Qty: 90 CAPSULE | Refills: 0 | Status: SHIPPED | OUTPATIENT
Start: 2018-07-06 | End: 2018-08-01 | Stop reason: SDUPTHER

## 2018-07-06 NOTE — TELEPHONE ENCOUNTER
Lumbosacral spondylosis without myelopathy     S/P laparoscopic hernia repair     Candidiasis, cutaneous with superadded bacterial infection

## 2018-07-10 RX ORDER — LISINOPRIL 40 MG/1
TABLET ORAL
Qty: 30 TABLET | Refills: 0 | Status: SHIPPED | OUTPATIENT
Start: 2018-07-10 | End: 2018-08-01 | Stop reason: SDUPTHER

## 2018-07-10 RX ORDER — MELOXICAM 15 MG/1
TABLET ORAL
Qty: 30 TABLET | Refills: 0 | Status: SHIPPED | OUTPATIENT
Start: 2018-07-10 | End: 2018-08-01 | Stop reason: SDUPTHER

## 2018-07-10 RX ORDER — LEVOTHYROXINE SODIUM 0.15 MG/1
TABLET ORAL
Qty: 30 TABLET | Refills: 0 | Status: SHIPPED | OUTPATIENT
Start: 2018-07-10 | End: 2018-08-01 | Stop reason: SDUPTHER

## 2018-07-10 RX ORDER — POTASSIUM CHLORIDE 750 MG/1
CAPSULE, EXTENDED RELEASE ORAL
Qty: 28 CAPSULE | Refills: 0 | Status: SHIPPED | OUTPATIENT
Start: 2018-07-10 | End: 2018-08-01 | Stop reason: SDUPTHER

## 2018-07-18 RX ORDER — LANCETS 33 GAUGE
EACH MISCELLANEOUS
Qty: 100 EACH | Refills: 0 | Status: SHIPPED | OUTPATIENT
Start: 2018-07-18 | End: 2018-08-14 | Stop reason: SDUPTHER

## 2018-08-01 DIAGNOSIS — E11.8 TYPE 2 DIABETES MELLITUS WITH COMPLICATION, WITH LONG-TERM CURRENT USE OF INSULIN (HCC): ICD-10-CM

## 2018-08-01 DIAGNOSIS — M79.674 PAIN OF TOE OF RIGHT FOOT: ICD-10-CM

## 2018-08-01 DIAGNOSIS — I10 ESSENTIAL HYPERTENSION: Chronic | ICD-10-CM

## 2018-08-01 DIAGNOSIS — R60.0 BILATERAL EDEMA OF LOWER EXTREMITY: ICD-10-CM

## 2018-08-01 DIAGNOSIS — Z79.4 TYPE 2 DIABETES MELLITUS WITH COMPLICATION, WITH LONG-TERM CURRENT USE OF INSULIN (HCC): ICD-10-CM

## 2018-08-01 DIAGNOSIS — E78.5 DYSLIPIDEMIA: Chronic | ICD-10-CM

## 2018-08-02 RX ORDER — POTASSIUM CHLORIDE 750 MG/1
CAPSULE, EXTENDED RELEASE ORAL
Qty: 28 CAPSULE | Refills: 0 | Status: SHIPPED | OUTPATIENT
Start: 2018-08-02 | End: 2018-08-28 | Stop reason: SDUPTHER

## 2018-08-02 RX ORDER — MELOXICAM 15 MG/1
TABLET ORAL
Qty: 30 TABLET | Refills: 0 | Status: SHIPPED | OUTPATIENT
Start: 2018-08-02 | End: 2018-08-28 | Stop reason: SDUPTHER

## 2018-08-02 RX ORDER — OMEPRAZOLE 40 MG/1
CAPSULE, DELAYED RELEASE ORAL
Qty: 30 CAPSULE | Refills: 0 | Status: SHIPPED | OUTPATIENT
Start: 2018-08-02 | End: 2018-08-28 | Stop reason: SDUPTHER

## 2018-08-02 RX ORDER — GABAPENTIN 300 MG/1
CAPSULE ORAL
Qty: 90 CAPSULE | Refills: 0 | Status: SHIPPED | OUTPATIENT
Start: 2018-08-02 | End: 2018-09-06 | Stop reason: SDUPTHER

## 2018-08-02 RX ORDER — ATENOLOL 50 MG/1
TABLET ORAL
Qty: 30 TABLET | Refills: 0 | Status: SHIPPED | OUTPATIENT
Start: 2018-08-02 | End: 2018-08-28 | Stop reason: SDUPTHER

## 2018-08-02 RX ORDER — ATORVASTATIN CALCIUM 40 MG/1
TABLET, FILM COATED ORAL
Qty: 30 TABLET | Refills: 0 | Status: SHIPPED | OUTPATIENT
Start: 2018-08-02 | End: 2018-08-28 | Stop reason: SDUPTHER

## 2018-08-02 RX ORDER — ASPIRIN 81 MG/1
TABLET ORAL
Qty: 30 TABLET | Refills: 0 | Status: SHIPPED | OUTPATIENT
Start: 2018-08-02 | End: 2018-08-28 | Stop reason: SDUPTHER

## 2018-08-02 RX ORDER — LEVOTHYROXINE SODIUM 0.15 MG/1
TABLET ORAL
Qty: 30 TABLET | Refills: 0 | Status: SHIPPED | OUTPATIENT
Start: 2018-08-02 | End: 2018-08-28 | Stop reason: SDUPTHER

## 2018-08-02 RX ORDER — LISINOPRIL 40 MG/1
TABLET ORAL
Qty: 30 TABLET | Refills: 0 | Status: SHIPPED | OUTPATIENT
Start: 2018-08-02 | End: 2018-08-28 | Stop reason: SDUPTHER

## 2018-08-02 NOTE — TELEPHONE ENCOUNTER
E-scribe request for PENDED MEDICATIONS. Please review and e-scribe if applicable. Last Visit Date:  4/12/18  Next Visit Date:  8/27/2018    Hemoglobin A1C (%)   Date Value   02/08/2018 7.4   10/24/2017 7.4   07/20/2017 9.9 (H)             ( goal A1C is < 7)   Microalb/Crt.  Ratio (mcg/mg creat)   Date Value   11/17/2017 27 (H)     LDL Cholesterol (mg/dL)   Date Value   07/20/2017 51   07/20/2017 48   07/20/2017 47       (goal LDL is <100)   AST (U/L)   Date Value   07/20/2017 21     ALT (U/L)   Date Value   07/20/2017 19     BUN (mg/dL)   Date Value   07/27/2018 10     BP Readings from Last 3 Encounters:   04/12/18 119/78   02/08/18 127/80   01/08/18 115/79          (goal 120/80)        Patient Active Problem List:     Hypertension     Diabetes mellitus-  insulin depedent type 2     Dyslipidemia     COPD (chronic obstructive pulmonary disease) (HCC)     DELGADO on CPAP     Arthritis     Bipolar disorder (HCC)     Hypothyroid     Hemorrhoids     Morbid obesity (HCC)     Lower extremity edema     Neck pain     Lumbosacral spondylosis without myelopathy     S/P laparoscopic hernia repair     Candidiasis, cutaneous with superadded bacterial infection      ----JF

## 2018-08-15 ENCOUNTER — HOSPITAL ENCOUNTER (OUTPATIENT)
Age: 48
Discharge: HOME | End: 2018-08-15
Payer: MEDICARE

## 2018-08-15 LAB
ALBUMIN SERPL-MCNC: 4.2 G/DL (ref 3.5–5.2)
ALBUMIN/GLOBULIN RATIO: 1.1 (ref 1–2.5)
ALP BLD-CCNC: 77 U/L (ref 35–104)
ALT SERPL-CCNC: 15 U/L (ref 5–33)
ANION GAP SERPL CALCULATED.3IONS-SCNC: 14 MMOL/L (ref 9–17)
AST SERPL-CCNC: 19 U/L
BILIRUB SERPL-MCNC: 0.36 MG/DL (ref 0.3–1.2)
BUN BLDV-MCNC: 11 MG/DL (ref 6–20)
BUN/CREAT BLD: ABNORMAL (ref 9–20)
CALCIUM SERPL-MCNC: 9.6 MG/DL (ref 8.6–10.4)
CHLORIDE BLD-SCNC: 96 MMOL/L (ref 98–107)
CHOLESTEROL, FASTING: 151 MG/DL
CHOLESTEROL/HDL RATIO: 2.8
CO2: 25 MMOL/L (ref 20–31)
CREAT SERPL-MCNC: 0.57 MG/DL (ref 0.5–0.9)
CREATININE URINE: 71.4 MG/DL (ref 28–217)
GFR AFRICAN AMERICAN: >60 ML/MIN
GFR NON-AFRICAN AMERICAN: >60 ML/MIN
GFR SERPL CREATININE-BSD FRML MDRD: ABNORMAL ML/MIN/{1.73_M2}
GFR SERPL CREATININE-BSD FRML MDRD: ABNORMAL ML/MIN/{1.73_M2}
GLUCOSE BLD-MCNC: 196 MG/DL (ref 70–99)
HDLC SERPL-MCNC: 54 MG/DL
LDL CHOLESTEROL: 70 MG/DL (ref 0–130)
MICROALBUMIN/CREAT 24H UR: 32 MG/L
MICROALBUMIN/CREAT UR-RTO: 45 MCG/MG CREAT
POTASSIUM SERPL-SCNC: 4.5 MMOL/L (ref 3.7–5.3)
SODIUM BLD-SCNC: 135 MMOL/L (ref 135–144)
TOTAL PROTEIN: 8.2 G/DL (ref 6.4–8.3)
TRIGLYCERIDE, FASTING: 135 MG/DL
VLDLC SERPL CALC-MCNC: NORMAL MG/DL (ref 1–30)

## 2018-08-15 PROCEDURE — 82043 UR ALBUMIN QUANTITATIVE: CPT

## 2018-08-15 PROCEDURE — 82570 ASSAY OF URINE CREATININE: CPT

## 2018-08-15 PROCEDURE — 80053 COMPREHEN METABOLIC PANEL: CPT

## 2018-08-15 PROCEDURE — 80061 LIPID PANEL: CPT

## 2018-08-15 RX ORDER — LANCETS 33 GAUGE
EACH MISCELLANEOUS
Qty: 100 EACH | Refills: 0 | Status: SHIPPED | OUTPATIENT
Start: 2018-08-15 | End: 2022-08-02

## 2018-08-20 ENCOUNTER — HOSPITAL ENCOUNTER (OUTPATIENT)
Age: 48
Setting detail: SPECIMEN
Discharge: HOME | End: 2018-08-20
Payer: MEDICARE

## 2018-08-20 ENCOUNTER — OFFICE VISIT (OUTPATIENT)
Dept: INTERNAL MEDICINE | Age: 48
End: 2018-08-20
Payer: MEDICARE

## 2018-08-20 VITALS
BODY MASS INDEX: 46.32 KG/M2 | SYSTOLIC BLOOD PRESSURE: 106 MMHG | HEIGHT: 65 IN | DIASTOLIC BLOOD PRESSURE: 64 MMHG | WEIGHT: 278 LBS | HEART RATE: 75 BPM

## 2018-08-20 DIAGNOSIS — J42 CHRONIC BRONCHITIS, UNSPECIFIED CHRONIC BRONCHITIS TYPE (HCC): Chronic | ICD-10-CM

## 2018-08-20 DIAGNOSIS — I10 ESSENTIAL HYPERTENSION: Chronic | ICD-10-CM

## 2018-08-20 DIAGNOSIS — E11.65 UNCONTROLLED TYPE 2 DIABETES MELLITUS WITH HYPERGLYCEMIA, WITH LONG-TERM CURRENT USE OF INSULIN (HCC): Primary | ICD-10-CM

## 2018-08-20 DIAGNOSIS — M47.817 LUMBOSACRAL SPONDYLOSIS WITHOUT MYELOPATHY: ICD-10-CM

## 2018-08-20 DIAGNOSIS — F31.9 BIPOLAR 1 DISORDER (HCC): ICD-10-CM

## 2018-08-20 DIAGNOSIS — E78.5 DYSLIPIDEMIA: Chronic | ICD-10-CM

## 2018-08-20 DIAGNOSIS — G47.33 OSA ON CPAP: Chronic | ICD-10-CM

## 2018-08-20 DIAGNOSIS — E66.01 MORBID OBESITY (HCC): ICD-10-CM

## 2018-08-20 DIAGNOSIS — D50.9 MICROCYTIC ANEMIA: ICD-10-CM

## 2018-08-20 DIAGNOSIS — E03.9 HYPOTHYROIDISM, UNSPECIFIED TYPE: Chronic | ICD-10-CM

## 2018-08-20 DIAGNOSIS — Z99.89 OSA ON CPAP: Chronic | ICD-10-CM

## 2018-08-20 DIAGNOSIS — Z79.4 UNCONTROLLED TYPE 2 DIABETES MELLITUS WITH HYPERGLYCEMIA, WITH LONG-TERM CURRENT USE OF INSULIN (HCC): Primary | ICD-10-CM

## 2018-08-20 LAB
ABSOLUTE EOS #: 0.33 K/UL (ref 0–0.44)
ABSOLUTE IMMATURE GRANULOCYTE: <0.03 K/UL (ref 0–0.3)
ABSOLUTE LYMPH #: 3.39 K/UL (ref 1.1–3.7)
ABSOLUTE MONO #: 0.57 K/UL (ref 0.1–1.2)
BASOPHILS # BLD: 1 % (ref 0–2)
BASOPHILS ABSOLUTE: 0.06 K/UL (ref 0–0.2)
DIFFERENTIAL TYPE: ABNORMAL
EOSINOPHILS RELATIVE PERCENT: 3 % (ref 1–4)
FERRITIN: 12 UG/L (ref 13–150)
FOLATE: 10.4 NG/ML
HCT VFR BLD CALC: 38.2 % (ref 36.3–47.1)
HEMOGLOBIN: 11 G/DL (ref 11.9–15.1)
IMMATURE GRANULOCYTES: 0 %
IRON SATURATION: 9 % (ref 20–55)
IRON: 34 UG/DL (ref 37–145)
LYMPHOCYTES # BLD: 32 % (ref 24–43)
MCH RBC QN AUTO: 22.7 PG (ref 25.2–33.5)
MCHC RBC AUTO-ENTMCNC: 28.8 G/DL (ref 28.4–34.8)
MCV RBC AUTO: 78.9 FL (ref 82.6–102.9)
MONOCYTES # BLD: 5 % (ref 3–12)
NRBC AUTOMATED: 0 PER 100 WBC
PDW BLD-RTO: 15.9 % (ref 11.8–14.4)
PLATELET # BLD: 422 K/UL (ref 138–453)
PLATELET ESTIMATE: ABNORMAL
PMV BLD AUTO: 10.5 FL (ref 8.1–13.5)
RBC # BLD: 4.84 M/UL (ref 3.95–5.11)
RBC # BLD: ABNORMAL 10*6/UL
SEG NEUTROPHILS: 59 % (ref 36–65)
SEGMENTED NEUTROPHILS ABSOLUTE COUNT: 6.28 K/UL (ref 1.5–8.1)
TOTAL IRON BINDING CAPACITY: 398 UG/DL (ref 250–450)
TSH SERPL DL<=0.05 MIU/L-ACNC: 1.45 MIU/L (ref 0.3–5)
UNSATURATED IRON BINDING CAPACITY: 364 UG/DL (ref 112–347)
VITAMIN B-12: 609 PG/ML (ref 232–1245)
WBC # BLD: 10.7 K/UL (ref 3.5–11.3)
WBC # BLD: ABNORMAL 10*3/UL

## 2018-08-20 PROCEDURE — G8926 SPIRO NO PERF OR DOC: HCPCS | Performed by: INTERNAL MEDICINE

## 2018-08-20 PROCEDURE — 99214 OFFICE O/P EST MOD 30 MIN: CPT | Performed by: INTERNAL MEDICINE

## 2018-08-20 PROCEDURE — 85025 COMPLETE CBC W/AUTO DIFF WBC: CPT

## 2018-08-20 PROCEDURE — 3045F PR MOST RECENT HEMOGLOBIN A1C LEVEL 7.0-9.0%: CPT | Performed by: INTERNAL MEDICINE

## 2018-08-20 PROCEDURE — G8417 CALC BMI ABV UP PARAM F/U: HCPCS | Performed by: INTERNAL MEDICINE

## 2018-08-20 PROCEDURE — 2022F DILAT RTA XM EVC RTNOPTHY: CPT | Performed by: INTERNAL MEDICINE

## 2018-08-20 PROCEDURE — 82746 ASSAY OF FOLIC ACID SERUM: CPT

## 2018-08-20 PROCEDURE — 82607 VITAMIN B-12: CPT

## 2018-08-20 PROCEDURE — 84443 ASSAY THYROID STIM HORMONE: CPT

## 2018-08-20 PROCEDURE — 83550 IRON BINDING TEST: CPT

## 2018-08-20 PROCEDURE — 83540 ASSAY OF IRON: CPT

## 2018-08-20 PROCEDURE — 82728 ASSAY OF FERRITIN: CPT

## 2018-08-20 PROCEDURE — 1036F TOBACCO NON-USER: CPT | Performed by: INTERNAL MEDICINE

## 2018-08-20 PROCEDURE — 36415 COLL VENOUS BLD VENIPUNCTURE: CPT

## 2018-08-20 PROCEDURE — G8427 DOCREV CUR MEDS BY ELIG CLIN: HCPCS | Performed by: INTERNAL MEDICINE

## 2018-08-20 PROCEDURE — 3023F SPIROM DOC REV: CPT | Performed by: INTERNAL MEDICINE

## 2018-08-20 RX ORDER — DULOXETIN HYDROCHLORIDE 30 MG/1
30 CAPSULE, DELAYED RELEASE ORAL DAILY
COMMUNITY
End: 2022-08-02

## 2018-08-20 ASSESSMENT — ENCOUNTER SYMPTOMS: BACK PAIN: 1

## 2018-08-20 NOTE — PROGRESS NOTES
[facility]    Progress Note    Date of patient's visit: 8/20/2018    Patient's Name:  Josie Arechiga    YOB: 1970            Patient Care Team:  Del Quinteros MD as PCP - General (Internal Medicine)  Urvashi Kim MD as PCP - [facility] Attributed Provider    REASON FOR VISIT: Routine outpatient follow     Chief Complaint   Patient presents with    Hypertension    Leg Pain     Pt c/o having bilateral leg pain and back pain that is progressivly getting worse     Discuss Labs     pt had labs done on 8/15/18     Follow-Up from Hospital     Pt was seen at [facility] 2 weeks ago, states she was sick and found out that she was anemic     Discuss Labs     pt had labs done on 7/27/18 and 8/15/18     Diabetes    Memory Loss     Pt states that she has been having memory issues. HISTORY OF PRESENT ILLNESS:    History was obtained from the patient. Josie Arechiga is a 53 y.o. is here for Follow-up of her chronic medical problems. She has numerous medical problems and sees several specialists. She was seen at [facility] 2 weeks ago after an abnormal lab work which showed a hemoglobin about 5.6. She says her lab was repeated and it was normal and she was discharged home from the ER. She does have menorrhagia and irregular menstrual bleeding. She has not seen a gynecologist in some time. She restarted her menstrual bleeding today. She has chronic low back pain and joint pains. She would like to be referred to pain management. She is also requesting a handicap sticker. She follows up with psychiatry for bipolar disorder and depression. She says she's been having increasing stress. She feels she is sometimes forgetful. She would also like a referral to bariatric clinic. She is not successful in losing weight. No results found for this visit on 08/20/18.     Lumbar xray     HISTORY:   ORDERING SYSTEM PROVIDED HISTORY: Chronic bilateral low back Negative for cough, sputum production and shortness of breath. Cardiovascular: Negative for chest pain, palpitations and leg swelling. Gastrointestinal: Negative for abdominal pain, constipation and nausea. Musculoskeletal: Positive for back pain and joint pain. Skin: Negative for itching and rash. Neurological: Positive for sensory change. Negative for dizziness, focal weakness and loss of consciousness. Endo/Heme/Allergies: Negative for polydipsia. Does not bruise/bleed easily. Psychiatric/Behavioral: Positive for depression and memory loss. Negative for substance abuse. The patient is nervous/anxious. PHYSICAL EXAM:      Vitals:    08/20/18 1120   BP: 106/64   Site: Right Arm   Position: Sitting   Cuff Size: Large Adult   Pulse: 75   Weight: 278 lb (126.1 kg)   Height: 5' 4.5\" (1.638 m)     Body mass index is 46.98 kg/m². BP Readings from Last 3 Encounters:   08/20/18 106/64   04/12/18 119/78   02/08/18 127/80        Wt Readings from Last 3 Encounters:   08/20/18 278 lb (126.1 kg)   04/12/18 276 lb 12.8 oz (125.6 kg)   02/08/18 262 lb (118.8 kg)       Physical Exam      HENT:  Normocephalic, Atraumatic. Neck- Normal range of motion, No tenderness, Supple, No stridor. Eyes:  PERRL, EOMI, Conjunctiva normal, No discharge. Respiratory:  Normal breath sounds, No respiratory distress, No wheezing, No chest tenderness. Cardiovascular:  Normal heart rate, Normal rhythm, No murmur  GI:  Bowel sounds normal, Soft, No tenderness, No masses  :   No CVA tenderness. Musculoskeletal:  Intact distal pulses, No edema, No tenderness  Integument:  Warm, Dry, No erythema, No rash. Lymphatic:  No lymphadenopathy noted. Neurologic:  Alert & oriented x 3, Normal motor function, Normal sensory function, No focal deficits noted.    Psychiatric:  Affect normal    LABORATORY FINDINGS:    CBC:  Lab Results   Component Value Date    WBC 5.25 07/27/2018    WBC 9.6 07/20/2017    HGB 5.9 07/27/2018    PLT

## 2018-08-21 RX ORDER — LANOLIN ALCOHOL/MO/W.PET/CERES
325 CREAM (GRAM) TOPICAL 2 TIMES DAILY
Qty: 60 TABLET | Refills: 3 | Status: SHIPPED | OUTPATIENT
Start: 2018-08-21 | End: 2018-10-29 | Stop reason: SDUPTHER

## 2018-08-26 ASSESSMENT — ENCOUNTER SYMPTOMS
BLURRED VISION: 0
COUGH: 0
SINUS PAIN: 0
SHORTNESS OF BREATH: 0
NAUSEA: 0
ABDOMINAL PAIN: 0
SPUTUM PRODUCTION: 0
EYE REDNESS: 0
CONSTIPATION: 0

## 2018-08-28 ENCOUNTER — TELEPHONE (OUTPATIENT)
Dept: INTERNAL MEDICINE | Age: 48
End: 2018-08-28

## 2018-08-28 DIAGNOSIS — I10 ESSENTIAL HYPERTENSION: Chronic | ICD-10-CM

## 2018-08-28 DIAGNOSIS — M47.817 LUMBOSACRAL SPONDYLOSIS WITHOUT MYELOPATHY: Primary | ICD-10-CM

## 2018-08-28 DIAGNOSIS — E78.5 DYSLIPIDEMIA: Chronic | ICD-10-CM

## 2018-08-28 DIAGNOSIS — R60.0 BILATERAL EDEMA OF LOWER EXTREMITY: ICD-10-CM

## 2018-08-28 DIAGNOSIS — Z79.4 TYPE 2 DIABETES MELLITUS WITH COMPLICATION, WITH LONG-TERM CURRENT USE OF INSULIN (HCC): ICD-10-CM

## 2018-08-28 DIAGNOSIS — M79.674 PAIN OF TOE OF RIGHT FOOT: ICD-10-CM

## 2018-08-28 DIAGNOSIS — E11.8 TYPE 2 DIABETES MELLITUS WITH COMPLICATION, WITH LONG-TERM CURRENT USE OF INSULIN (HCC): ICD-10-CM

## 2018-08-28 NOTE — TELEPHONE ENCOUNTER
E-scribe request for PENDED MEDICATIONS. Please review and e-scribe if applicable. Last Visit Date:  8/20/18  Next Visit Date:  11/20/2018    Hemoglobin A1C (%)   Date Value   02/08/2018 7.4   10/24/2017 7.4   07/20/2017 9.9 (H)             ( goal A1C is < 7)   Microalb/Crt.  Ratio (mcg/mg creat)   Date Value   08/15/2018 45 (H)     LDL Cholesterol (mg/dL)   Date Value   08/15/2018 70       (goal LDL is <100)   AST (U/L)   Date Value   08/15/2018 19     ALT (U/L)   Date Value   08/15/2018 15     BUN (mg/dL)   Date Value   08/15/2018 11     BP Readings from Last 3 Encounters:   08/20/18 106/64   04/12/18 119/78   02/08/18 127/80          (goal 120/80)        Patient Active Problem List:     Hypertension     Diabetes mellitus-  insulin depedent type 2     Dyslipidemia     COPD (chronic obstructive pulmonary disease) (HCC)     DELGADO on CPAP     Arthritis     Bipolar disorder (HCC)     Hypothyroid     Hemorrhoids     Morbid obesity ([address]     Lower extremity edema     Neck pain     Lumbosacral spondylosis without myelopathy     S/P laparoscopic hernia repair     Candidiasis, cutaneous with superadded bacterial infection      ----JF

## 2018-08-29 RX ORDER — ASPIRIN 81 MG/1
TABLET ORAL
Qty: 30 TABLET | Refills: 5 | Status: SHIPPED | OUTPATIENT
Start: 2018-08-29 | End: 2019-02-20 | Stop reason: SDUPTHER

## 2018-08-29 RX ORDER — OMEPRAZOLE 40 MG/1
CAPSULE, DELAYED RELEASE ORAL
Qty: 30 CAPSULE | Refills: 2 | Status: SHIPPED | OUTPATIENT
Start: 2018-08-29 | End: 2018-11-21 | Stop reason: SDUPTHER

## 2018-08-29 RX ORDER — ATORVASTATIN CALCIUM 40 MG/1
TABLET, FILM COATED ORAL
Qty: 30 TABLET | Refills: 5 | Status: SHIPPED | OUTPATIENT
Start: 2018-08-29 | End: 2019-01-22 | Stop reason: SDUPTHER

## 2018-08-29 RX ORDER — MELOXICAM 15 MG/1
TABLET ORAL
Qty: 30 TABLET | Refills: 0 | Status: SHIPPED | OUTPATIENT
Start: 2018-08-29 | End: 2018-09-19 | Stop reason: SDUPTHER

## 2018-08-29 RX ORDER — POTASSIUM CHLORIDE 750 MG/1
CAPSULE, EXTENDED RELEASE ORAL
Qty: 28 CAPSULE | Refills: 0 | Status: SHIPPED | OUTPATIENT
Start: 2018-08-29 | End: 2018-09-28 | Stop reason: SDUPTHER

## 2018-08-29 RX ORDER — ATENOLOL 50 MG/1
TABLET ORAL
Qty: 30 TABLET | Refills: 5 | Status: SHIPPED | OUTPATIENT
Start: 2018-08-29 | End: 2019-02-18 | Stop reason: SDUPTHER

## 2018-08-29 RX ORDER — LISINOPRIL 40 MG/1
TABLET ORAL
Qty: 30 TABLET | Refills: 5 | Status: SHIPPED | OUTPATIENT
Start: 2018-08-29 | End: 2019-02-18 | Stop reason: SDUPTHER

## 2018-08-29 RX ORDER — LEVOTHYROXINE SODIUM 0.15 MG/1
TABLET ORAL
Qty: 30 TABLET | Refills: 5 | Status: SHIPPED | OUTPATIENT
Start: 2018-08-29 | End: 2019-02-18 | Stop reason: SDUPTHER

## 2018-09-07 RX ORDER — GABAPENTIN 300 MG/1
CAPSULE ORAL
Qty: 90 CAPSULE | Refills: 0 | Status: SHIPPED | OUTPATIENT
Start: 2018-09-07 | End: 2018-09-28 | Stop reason: SDUPTHER

## 2018-09-14 DIAGNOSIS — Z79.4 TYPE 2 DIABETES MELLITUS WITH COMPLICATION, WITH LONG-TERM CURRENT USE OF INSULIN (HCC): ICD-10-CM

## 2018-09-14 DIAGNOSIS — E11.8 TYPE 2 DIABETES MELLITUS WITH COMPLICATION, WITH LONG-TERM CURRENT USE OF INSULIN (HCC): ICD-10-CM

## 2018-09-17 ENCOUNTER — HOSPITAL ENCOUNTER (OUTPATIENT)
Age: 48
Setting detail: SPECIMEN
Discharge: HOME | End: 2018-09-17
Payer: MEDICARE

## 2018-09-17 ENCOUNTER — OFFICE VISIT (OUTPATIENT)
Dept: OBGYN | Age: 48
End: 2018-09-17
Payer: MEDICARE

## 2018-09-17 VITALS
HEART RATE: 73 BPM | SYSTOLIC BLOOD PRESSURE: 138 MMHG | BODY MASS INDEX: 45.82 KG/M2 | DIASTOLIC BLOOD PRESSURE: 76 MMHG | WEIGHT: 275 LBS | HEIGHT: 65 IN | RESPIRATION RATE: 14 BRPM

## 2018-09-17 DIAGNOSIS — Z76.89 ENCOUNTER TO ESTABLISH CARE: Primary | ICD-10-CM

## 2018-09-17 DIAGNOSIS — Z01.419 WELL WOMAN EXAM WITH ROUTINE GYNECOLOGICAL EXAM: ICD-10-CM

## 2018-09-17 LAB
DIRECT EXAM: ABNORMAL
Lab: ABNORMAL
SPECIMEN DESCRIPTION: ABNORMAL
STATUS: ABNORMAL

## 2018-09-17 PROCEDURE — G0101 CA SCREEN;PELVIC/BREAST EXAM: HCPCS | Performed by: STUDENT IN AN ORGANIZED HEALTH CARE EDUCATION/TRAINING PROGRAM

## 2018-09-17 PROCEDURE — 99203 OFFICE O/P NEW LOW 30 MIN: CPT | Performed by: STUDENT IN AN ORGANIZED HEALTH CARE EDUCATION/TRAINING PROGRAM

## 2018-09-17 RX ORDER — NITROGLYCERIN 0.4 MG/1
TABLET SUBLINGUAL
Qty: 25 TABLET | Refills: 0 | Status: SHIPPED | OUTPATIENT
Start: 2018-09-17 | End: 2022-08-02

## 2018-09-17 NOTE — PROGRESS NOTES
Tomy Staples  2018              53 y.o. Chief Complaint   Patient presents with    New Patient    Gynecologic Exam     Annual         No LMP recorded. Primary Care Physician: Senait Gamboa MD    HPI : Tomy Staples is a 53 y.o. female [id number] The patient was seen and examined. She is here for her annual exam. She is complaining of thin yellow vaginal discharge associated with \"fishy odor\" and she believes she has bacterial vaginosis because she often has bacterial infections. She is also complaining of frequent/leaking of urination for the past 6 months and reports she thinks it is due to the Lasix and she sees her PCP for that. Her bowels are regular. She reports her LMP was 18. She states she has regular periods with 28-30 day periods lasting 5-9 days. She states she will typically have 2-3 days of heavy bleeding and then it lightens up. She states she is currently sexually active with one male partner. Reports tubal ligation, and does not use anything for barrier contraception.   She denies any bloating.     ________________________________________________________________________  OB History    Para Term  AB Living   6 4 4   2 4   SAB TAB Ectopic Molar Multiple Live Births   2         4      # Outcome Date GA Lbr Salvador/2nd Weight Sex Delivery Anes PTL Lv   6 Term 03 40w0d   M Vag-Spont   LUISA   5 Term 02 40w0d   F Vag-Spont None  LUISA   4 Term 00 40w0d   F Vag-Spont None  LUISA   3 Term 99 40w0d  7 lb 7 oz (3.374 kg) F Vag-Spont   LUISA   2 1995        ND   1 1993        Past Medical History:   Diagnosis Date    Arthritis     Back pain 10/29/2015    Bipolar 1 disorder (HCC)     COPD (chronic obstructive pulmonary disease) (HCC)     Depression     Diabetes mellitus (HCC)     GERD (gastroesophageal reflux disease)     Glaucoma     Hx of blood clots     DVT  (20yrs ago)    Hyperlipidemia     Hypertension     Lumbosacral spondylosis without myelopathy     Morbid obesity ([facility] [address] 10/12/2015    On home oxygen therapy     2 liters into the cpap machine    Pitting edema     PONV (postoperative nausea and vomiting)     Renal stones     history of renal stones    Sleep apnea     cpap    Thyroid disease     Warts, genital                                                                    Past Surgical History:   Procedure Laterality Date    CHOLECYSTECTOMY      HERNIA REPAIR  05/02/2017    HERNIA REPAIR N/A 5/2/2017    HERNIA INCISIONAL REPAIR LAPAROSCOPIC ROBOTIC , lysis of adhensions performed by Obey Snyder MD at [address]       Family History   Problem Relation Age of Onset    Hypertension Mother     Diabetes Mother     COPD Mother     Lung Cancer Father     Brain Cancer Father      Social History     Social History    Marital status:      Spouse name: N/A    Number of children: N/A    Years of education: N/A     Occupational History    Not on file.      Social History Main Topics    Smoking status: Former Smoker     Years: 25.00    Smokeless tobacco: Never Used      Comment: quit in 2005    Alcohol use 6.0 - 7.2 oz/week     10 - 12 Cans of beer per week    Drug use: No    Sexual activity: Yes     Partners: Male     Birth control/ protection: Surgical      Comment: S/P tubal ligation     Other Topics Concern    Not on file     Social History Narrative    No narrative on file         MEDICATIONS:  Current Outpatient Prescriptions   Medication Sig Dispense Refill    BREO ELLIPTA 200-25 MCG/INH AEPB INHALE 1 PUFF INTO THE LUNGS DAILY 60 each 3    gabapentin (NEURONTIN) 300 MG capsule TAKE 1 CAPSULE THREE TIMES A DAY 90 capsule 0    atorvastatin (LIPITOR) 40 MG tablet TAKE 1 TABLET DAILY 30 tablet 5    ASPIRIN ADULT LOW STRENGTH 81 MG EC tablet TAKE 1 TABLET DAILY 30 tablet 5    metFORMIN (GLUCOPHAGE) 1000 MG tablet TAKE 1 TABLET TWICE A DAY 60 tablet 5    levothyroxine (SYNTHROID) 150 MCG tablet TAKE 1 TABLET DAILY 30 tablet 5    meloxicam (MOBIC) 15 MG tablet TAKE 1 TABLET DAILY 30 tablet 0    lisinopril (PRINIVIL;ZESTRIL) 40 MG tablet TAKE 1 TABLET DAILY 30 tablet 5    potassium chloride (MICRO-K) 10 MEQ extended release capsule TAKE 1 CAPSULE DAILY 28 capsule 0    atenolol (TENORMIN) 50 MG tablet TAKE 1 TABLET DAILY 30 tablet 5    omeprazole (PRILOSEC) 40 MG delayed release capsule TAKE 1 CAPSULE DAILY 30 capsule 2    ferrous sulfate (FE TABS) 325 (65 Fe) MG EC tablet Take 1 tablet by mouth 2 times daily 60 tablet 3    DULoxetine (CYMBALTA) 30 MG extended release capsule Take 30 mg by mouth daily      Handicap Placard MISC by Does not apply route 1 each 0    COMFORT EZ PEN NEEDLES 32G X 6 MM MISC USE AS DIRECTED DAILY 100 each 0    furosemide (LASIX) 20 MG tablet TAKE 1 TABLET DAILY 60 tablet 0    neomycin-bacitracin-polymyxin (NEOSPORIN) 400-5-5000 ointment Apply topically 2 times daily Apply topically 2 times daily alternating with nystatin powder 1 Tube 2    nystatin (MYCOSTATIN) 677579 UNIT/GM powder Apply 2 times daily to affected area 1 Bottle 1    benzonatate (TESSALON) 200 MG capsule take 1 capsule by mouth three times a day if needed for cough 9 capsule 0    cyclobenzaprine (FLEXERIL) 5 MG tablet TAKE 1 TABLET TWICE A DAY AS NEEDED FOR MUSCLE SPASMS 60 tablet 0    TRUE METRIX BLOOD GLUCOSE TEST strip USE AS DIRECTED DAILY AS NEEDED 100 each 5    NITROSTAT 0.4 MG SL tablet TAKE 1 TABLET UNDER THE TONGUE EVERY 5 MINUTES AS NEEDED FOR CHESTPAIN 25 tablet 0    Lancets MISC Use to check sugars daily.  100 each 3    temazepam (RESTORIL) 15 MG capsule Take 1 capsule by mouth nightly as needed for Sleep 15 capsule 0    insulin detemir (LEVEMIR FLEXTOUCH) 100 UNIT/ML injection pen Inject 100 Units into the skin 2 times daily Discontinue Lantus 5 Pen 3    clonazePAM (KLONOPIN) 0.5 MG tablet Take 0.5 mg by mouth nightly as needed       DULoxetine (CYMBALTA) 60 MG extended Diabetic foot exam  11/03/2017    Cervical cancer screen  04/24/2018    Flu vaccine (1) 09/01/2018       Date of Last Pap Smear: 4/24/15 (negative)   Abnormal Pap Smear History: reports remote history (LSIL)  Colposcopy History: 1990s  Date of Last Mammogram: 3/7/18, negative   Date of Last Colonoscopy: N/A  Date of Last Bone Density: N/A    ________________________________________________________________________  REVIEW OF SYSTEMS:     A minimum of an eleven point review of systems was completed.     Review Of Systems (11 point):  CONSTITUTIONAL:  negative for  fevers, chills and sweats  EYES:  negative for  double vision, blurred vision, irritation and redness  HEENT:  negative for  tinnitus, earaches, nasal congestion, epistaxis and sore throat  RESPIRATORY:  positive for  dry cough, cough with sputum, dyspnea and wheezing  CARDIOVASCULAR:  Positive for chest pain (reports follows with PCP)   GASTROINTESTINAL:  negative for nausea, vomiting, change in bowel habits, diarrhea, constipation and abdominal pain  GENITOURINARY:  positive for frequency, urinary incontinence, genital lesions, and hot flashes, negative for vaginal pain, vaginal itching  INTEGUMENT/BREAST:  negative for rash, skin lesion(s), dryness, breast lump, nipple discharge and breast tenderness  HEMATOLOGIC/LYMPHATIC:  negative for easy bruising and bleeding  ALLERGIC/IMMUNOLOGIC:  negative for recurrent infections  ENDOCRINE:  positive for diabetic symptoms including neither polyuria nor polydipsia nor blurred vision nor neuropathy  MUSCULOSKELETAL:  negative for  myalgias and arthralgias  NEUROLOGICAL:  negative for headaches, dizziness, seizures, weakness, numbness and syncope  BEHAVIOR/PSYCH:  positive for depressed mood                                                                                                                                                                                                              PHYSICAL Exam: prolapse or lesions  Cervix: normal appearance  Adnexa: normal bimanual exam  Uterus: normal single, nontender    Musculosk:  Normal Gait and station was noted. Digits were evaluated without abnormal findings. Range of motion, stability and strength were evaluated and found to be appropriate for the patients age. ASSESSMENT:      53 y.o. Annual   Diagnosis Orders   1. Encounter to establish care     2.  Well woman exam with routine gynecological exam  PAP Smear    C.trachomatis N.gonorrhoeae DNA    VAGINITIS DNA PROBE          Chief Complaint   Patient presents with    New Patient    Gynecologic Exam     Annual          Past Medical History:   Diagnosis Date    Arthritis     Back pain 10/29/2015    Bipolar 1 disorder ([address]     COPD (chronic obstructive pulmonary disease) ([address]     Depression     Diabetes mellitus ([address]     GERD (gastroesophageal reflux disease)     Glaucoma     Hx of blood clots     DVT  (20yrs ago)    Hyperlipidemia     Hypertension     Lumbosacral spondylosis without myelopathy     Morbid obesity ([address] 10/12/2015    On home oxygen therapy     2 liters into the cpap machine    Pitting edema     PONV (postoperative nausea and vomiting)     Renal stones     history of renal stones    Sleep apnea     cpap    Thyroid disease     Warts, genital          Patient Active Problem List    Diagnosis Date Noted    Candidiasis, cutaneous with superadded bacterial infection 04/12/2018    S/P laparoscopic hernia repair 05/03/2017    Lumbosacral spondylosis without myelopathy 05/19/2016    Neck pain 10/31/2015    Lower extremity edema 10/29/2015    Morbid obesity ([address] 10/12/2015    Hypertension 03/14/2014    Diabetes mellitus-  insulin depedent type 2 03/14/2014    Dyslipidemia 03/14/2014    COPD (chronic obstructive pulmonary disease) ([address] 03/14/2014    DELGADO on CPAP 03/14/2014    Arthritis 03/14/2014    Bipolar disorder ([address] 03/14/2014    Hypothyroid 03/14/2014   

## 2018-09-17 NOTE — PROGRESS NOTES
Date: 2018  Time: 10:56 AM      Patient Name: Lord Pan  Patient : 1970  Room/Bed: Room/bed info not found  Admission Date/Time: No admission date for patient encounter. Attending Physician Statement  I have discussed the care of Lord Pan, including pertinent history and exam findings with the resident. I have reviewed and edited their note in the electronic medical record. The key elements of all parts of the encounter have been performed/reviewed by me . I agree with the assessment, plan and orders as documented by the resident. The level of care submitted represents to the best of my ability the care documented in the medical record today. GC Modifier. This service has been performed in part by a resident under the direction of a teaching physician. Patient was seen today for annual gyn exam as a new patient. She has cHTN and diabetes type II for which is being followed by her PCP. Breast and pelvic findings were unremarkable. Pap smear and cultures were obtained but mammogram was up to date.  F/U PRN    Attending's Name:  Josie Salas MD

## 2018-09-18 LAB
HPV SAMPLE: NORMAL
HPV SOURCE: NORMAL
HPV, GENOTYPE 16: NOT DETECTED
HPV, GENOTYPE 18: NOT DETECTED
HPV, HIGH RISK OTHER: NOT DETECTED
HPV, INTERPRETATION: NORMAL

## 2018-09-19 ENCOUNTER — OFFICE VISIT (OUTPATIENT)
Dept: INTERNAL MEDICINE | Age: 48
End: 2018-09-19
Payer: MEDICARE

## 2018-09-19 VITALS
BODY MASS INDEX: 45.82 KG/M2 | SYSTOLIC BLOOD PRESSURE: 126 MMHG | HEIGHT: 65 IN | WEIGHT: 275 LBS | DIASTOLIC BLOOD PRESSURE: 90 MMHG | HEART RATE: 90 BPM

## 2018-09-19 DIAGNOSIS — G47.33 OSA ON CPAP: Chronic | ICD-10-CM

## 2018-09-19 DIAGNOSIS — Z23 NEEDS FLU SHOT: ICD-10-CM

## 2018-09-19 DIAGNOSIS — G89.29 ACUTE EXACERBATION OF CHRONIC LOW BACK PAIN: Primary | ICD-10-CM

## 2018-09-19 DIAGNOSIS — E66.01 MORBID OBESITY WITH BODY MASS INDEX (BMI) OF 40.0 TO 49.9 (HCC): ICD-10-CM

## 2018-09-19 DIAGNOSIS — I10 ESSENTIAL HYPERTENSION: Chronic | ICD-10-CM

## 2018-09-19 DIAGNOSIS — M54.50 ACUTE EXACERBATION OF CHRONIC LOW BACK PAIN: Primary | ICD-10-CM

## 2018-09-19 DIAGNOSIS — Z99.89 OSA ON CPAP: Chronic | ICD-10-CM

## 2018-09-19 LAB
C TRACH DNA GENITAL QL NAA+PROBE: NEGATIVE
N. GONORRHOEAE DNA: NEGATIVE

## 2018-09-19 PROCEDURE — 1036F TOBACCO NON-USER: CPT | Performed by: INTERNAL MEDICINE

## 2018-09-19 PROCEDURE — 99213 OFFICE O/P EST LOW 20 MIN: CPT | Performed by: INTERNAL MEDICINE

## 2018-09-19 PROCEDURE — 99211 OFF/OP EST MAY X REQ PHY/QHP: CPT | Performed by: INTERNAL MEDICINE

## 2018-09-19 PROCEDURE — G8427 DOCREV CUR MEDS BY ELIG CLIN: HCPCS | Performed by: INTERNAL MEDICINE

## 2018-09-19 PROCEDURE — 90686 IIV4 VACC NO PRSV 0.5 ML IM: CPT | Performed by: INTERNAL MEDICINE

## 2018-09-19 PROCEDURE — G8417 CALC BMI ABV UP PARAM F/U: HCPCS | Performed by: INTERNAL MEDICINE

## 2018-09-19 RX ORDER — MELOXICAM 15 MG/1
TABLET ORAL
Qty: 30 TABLET | Refills: 0 | Status: SHIPPED | OUTPATIENT
Start: 2018-09-19 | End: 2018-12-03

## 2018-09-19 RX ORDER — CYCLOBENZAPRINE HCL 5 MG
TABLET ORAL
Qty: 60 TABLET | Refills: 0 | Status: SHIPPED | OUTPATIENT
Start: 2018-09-19 | End: 2020-06-06

## 2018-09-19 ASSESSMENT — ENCOUNTER SYMPTOMS: BACK PAIN: 1

## 2018-09-19 NOTE — PROGRESS NOTES
tablet by mouth 2 times daily 60 tablet 3    DULoxetine (CYMBALTA) 30 MG extended release capsule Take 30 mg by mouth daily      COMFORT EZ PEN NEEDLES 32G X 6 MM MISC USE AS DIRECTED DAILY 100 each 0    furosemide (LASIX) 20 MG tablet TAKE 1 TABLET DAILY 60 tablet 0    neomycin-bacitracin-polymyxin (NEOSPORIN) 400-5-5000 ointment Apply topically 2 times daily Apply topically 2 times daily alternating with nystatin powder 1 Tube 2    nystatin (MYCOSTATIN) 767260 UNIT/GM powder Apply 2 times daily to affected area 1 Bottle 1    benzonatate (TESSALON) 200 MG capsule take 1 capsule by mouth three times a day if needed for cough 9 capsule 0    cyclobenzaprine (FLEXERIL) 5 MG tablet TAKE 1 TABLET TWICE A DAY AS NEEDED FOR MUSCLE SPASMS 60 tablet 0    TRUE METRIX BLOOD GLUCOSE TEST strip USE AS DIRECTED DAILY AS NEEDED 100 each 5    Lancets MISC Use to check sugars daily. 100 each 3    temazepam (RESTORIL) 15 MG capsule Take 1 capsule by mouth nightly as needed for Sleep 15 capsule 0    insulin detemir (LEVEMIR FLEXTOUCH) 100 UNIT/ML injection pen Inject 100 Units into the skin 2 times daily Discontinue Lantus 5 Pen 3    clonazePAM (KLONOPIN) 0.5 MG tablet Take 0.5 mg by mouth nightly as needed       DULoxetine (CYMBALTA) 60 MG extended release capsule Take 60 mg by mouth daily       LUMIGAN 0.01 % SOLN ophthalmic drops Place 1 drop into both eyes nightly       albuterol (PROVENTIL) (2.5 MG/3ML) 0.083% nebulizer solution Take 3 mLs by nebulization every 6 hours as needed for Wheezing 120 each 3    LATUDA 80 MG TABS tablet Take 80 mg by mouth daily       sertraline (ZOLOFT) 100 MG tablet Take 1 tablet by mouth nightly      traZODone (DESYREL) 100 MG tablet Take 1 tablet by mouth nightly      OLANZapine (ZYPREXA) 10 MG tablet TAKE 1 TABLET AT BEDTIME. 28 tablet 1     No current facility-administered medications on file prior to visit. SOCIAL HISTORY    Reviewed and no change from previous record. Nelida Lu  reports that she has quit smoking. She quit after 25.00 years of use. She has never used smokeless tobacco.    FAMILY HISTORY:    Reviewed and No change from previous visit    HEALTH MAINTENANCE DUE:      Health Maintenance Due   Topic Date Due    Diabetic retinal exam  12/08/2016    Diabetic foot exam  11/03/2017       REVIEW OF SYSTEMS:    12 point review of symptoms completed and found to be normal except noted in the HPI    Review of Systems   Constitutional: Positive for malaise/fatigue. Negative for fever and weight loss. Eyes: Negative for photophobia and redness. Respiratory: Positive for shortness of breath. Negative for cough and sputum production. Cardiovascular: Positive for leg swelling. Negative for chest pain and palpitations. Gastrointestinal: Negative for abdominal pain, diarrhea and nausea. Genitourinary: Negative for dysuria and frequency. Musculoskeletal: Positive for back pain and joint pain. Negative for falls. Neurological: Positive for sensory change. Negative for dizziness, loss of consciousness and headaches. Endo/Heme/Allergies: Negative for polydipsia. Does not bruise/bleed easily. PHYSICAL EXAM:      Vitals:    09/19/18 1534   BP: (!) 126/90   Site: Right Upper Arm   Position: Sitting   Cuff Size: Large Adult   Pulse: 90   Weight: 275 lb (124.7 kg)   Height: 5' 4.5\" (1.638 m)     Body mass index is 46.47 kg/m². BP Readings from Last 3 Encounters:   09/19/18 (!) 126/90   09/17/18 138/76   08/20/18 106/64        Wt Readings from Last 3 Encounters:   09/19/18 275 lb (124.7 kg)   09/17/18 275 lb (124.7 kg)   08/20/18 278 lb (126.1 kg)       Physical Exam   Constitutional: She is oriented to person, place, and time and well-developed, well-nourished, and in no distress. HENT:   Head: Normocephalic and atraumatic. Eyes: Pupils are equal, round, and reactive to light. Conjunctivae and EOM are normal.   Neck: Normal range of motion. Neck supple.

## 2018-09-19 NOTE — PATIENT INSTRUCTIONS
You have been given an order for a x-ray. Please have x-ray performed at [facility], there is no need to schedule test.    Printed DME order for cpap given to patient with a copy of the office notes    Avs was given and reviewed appt card given with next appt. It is very important that you keep this appointment, if you need to cancel please call [phone] with any questions.  MM

## 2018-09-20 ENCOUNTER — HOSPITAL ENCOUNTER (OUTPATIENT)
Dept: GENERAL RADIOLOGY | Age: 48
Discharge: HOME | End: 2018-09-22
Payer: MEDICARE

## 2018-09-20 ENCOUNTER — HOSPITAL ENCOUNTER (OUTPATIENT)
Age: 48
Discharge: HOME | End: 2018-09-22
Payer: MEDICARE

## 2018-09-20 DIAGNOSIS — G89.29 CHRONIC LOW BACK PAIN, UNSPECIFIED BACK PAIN LATERALITY, WITH SCIATICA PRESENCE UNSPECIFIED: ICD-10-CM

## 2018-09-20 DIAGNOSIS — M54.5 CHRONIC LOW BACK PAIN, UNSPECIFIED BACK PAIN LATERALITY, WITH SCIATICA PRESENCE UNSPECIFIED: ICD-10-CM

## 2018-09-20 PROCEDURE — 72100 X-RAY EXAM L-S SPINE 2/3 VWS: CPT

## 2018-09-22 ASSESSMENT — ENCOUNTER SYMPTOMS
EYE REDNESS: 0
COUGH: 0
PHOTOPHOBIA: 0
SPUTUM PRODUCTION: 0
NAUSEA: 0
SHORTNESS OF BREATH: 1
ABDOMINAL PAIN: 0
DIARRHEA: 0

## 2018-09-25 ENCOUNTER — HOSPITAL ENCOUNTER (OUTPATIENT)
Dept: PAIN MANAGEMENT | Age: 48
Discharge: HOME | End: 2018-09-25
Payer: MEDICARE

## 2018-09-25 VITALS
BODY MASS INDEX: 45.82 KG/M2 | SYSTOLIC BLOOD PRESSURE: 132 MMHG | HEIGHT: 65 IN | TEMPERATURE: 97.7 F | WEIGHT: 275 LBS | DIASTOLIC BLOOD PRESSURE: 83 MMHG | HEART RATE: 83 BPM | RESPIRATION RATE: 16 BRPM

## 2018-09-25 DIAGNOSIS — E66.01 MORBID OBESITY WITH BMI OF 45.0-49.9, ADULT (HCC): Chronic | ICD-10-CM

## 2018-09-25 DIAGNOSIS — M54.50 CHRONIC BILATERAL LOW BACK PAIN WITHOUT SCIATICA: Chronic | ICD-10-CM

## 2018-09-25 DIAGNOSIS — Z99.89 OSA ON CPAP: Primary | Chronic | ICD-10-CM

## 2018-09-25 DIAGNOSIS — M53.3 TAIL BONE PAIN: Chronic | ICD-10-CM

## 2018-09-25 DIAGNOSIS — M79.605 BILATERAL LEG PAIN: Chronic | ICD-10-CM

## 2018-09-25 DIAGNOSIS — M79.604 BILATERAL LEG PAIN: Chronic | ICD-10-CM

## 2018-09-25 DIAGNOSIS — G47.33 OSA ON CPAP: Primary | Chronic | ICD-10-CM

## 2018-09-25 DIAGNOSIS — F99 PSYCHIATRIC ILLNESS: Chronic | ICD-10-CM

## 2018-09-25 DIAGNOSIS — Z79.899 CHRONIC PRESCRIPTION BENZODIAZEPINE USE: Chronic | ICD-10-CM

## 2018-09-25 DIAGNOSIS — G89.29 CHRONIC BILATERAL LOW BACK PAIN WITHOUT SCIATICA: Chronic | ICD-10-CM

## 2018-09-25 PROCEDURE — 99204 OFFICE O/P NEW MOD 45 MIN: CPT

## 2018-09-25 PROCEDURE — 99204 OFFICE O/P NEW MOD 45 MIN: CPT | Performed by: ANESTHESIOLOGY

## 2018-09-25 ASSESSMENT — ENCOUNTER SYMPTOMS
SHORTNESS OF BREATH: 1
BACK PAIN: 1
BLURRED VISION: 0
HEARTBURN: 0

## 2018-09-25 ASSESSMENT — PAIN DESCRIPTION - FREQUENCY: FREQUENCY: CONTINUOUS

## 2018-09-25 ASSESSMENT — PAIN DESCRIPTION - PROGRESSION: CLINICAL_PROGRESSION: GRADUALLY WORSENING

## 2018-09-25 ASSESSMENT — PAIN DESCRIPTION - ONSET: ONSET: PROGRESSIVE

## 2018-09-25 ASSESSMENT — PAIN DESCRIPTION - LOCATION: LOCATION: NECK;BACK

## 2018-09-25 ASSESSMENT — PAIN DESCRIPTION - DESCRIPTORS: DESCRIPTORS: CONSTANT;SHARP

## 2018-09-25 ASSESSMENT — PAIN DESCRIPTION - PAIN TYPE: TYPE: CHRONIC PAIN

## 2018-09-25 ASSESSMENT — PAIN SCALES - GENERAL: PAINLEVEL_OUTOF10: 8

## 2018-09-25 ASSESSMENT — PAIN DESCRIPTION - ORIENTATION: ORIENTATION: RIGHT;LEFT

## 2018-09-25 NOTE — PROGRESS NOTES
[facility]  Patient Pain Assessment  Consultation No att. providers found    Primary Care Physician: Samaria Butler MD    Chief complaint: No chief complaint on file. Charlie Vance HISTORY OF PRESENT ILLNESS:  Munira Soriano is 53 y.o. female with    HPI  This is a 81-year-old pleasant female with significant past medical history of morbid obesity, BMI more than 46, obstructive sleep apnea on CPAP machine, diabetes mellitus, significant psychiatric history, on chronic benzodiazepine therapy, taking several psychiatric medications. 1. Chronic lower back pain, located in the lumbosacral area across midline affecting both sides with extension into the mid back area and down over the hip and gluteal region. She describes the pain as constant aching throbbing stabbing pain sensation. Associated with morning stiffness. Onset of symptoms several years ago. Symptoms are progressively worsened over years. She denies any sciatica-like symptoms with radiation down the legs. No associated dermatomal paresthesia. No changes in bladder or bowel control. No previous lumbar spine MRI  No previous lumbar spine injection history  No previous lumbar spine surgical history. Patient had a recent lumbar spine plain film which is available in Epic as been doing physical therapy and of attended so far 8 sessions without any significant improvement in her symptoms. 2.  Bilateral lower extremity pain  Patient describes the lower extremity pain from knee down on both sides all the way to the toes she describes them as constant burning tingling sensation. She states this is like insects crawling. She have a long history of diabetes. Patient do not recall any EMG nerve conduction studies. She is currently taking gabapentin for neuropathy    3.   New onset of tailbone pain  Patient states that a few weeks back when she was bending to set something and facet suddenly developed pain and tenderness [loratadine-pseudoephedrine er]; Iodine; and Prochlorperazine edisylate    Family History  family history includes Brain Cancer in her father; COPD in her mother; Diabetes in her mother; Hypertension in her mother; Jey Reis in her father. Social History  Social History     Social History    Marital status:      Spouse name: N/A    Number of children: N/A    Years of education: N/A     Social History Main Topics    Smoking status: Former Smoker     Years: 25.00    Smokeless tobacco: Never Used      Comment: quit in 2005    Alcohol use 6.0 - 7.2 oz/week     10 - 12 Cans of beer per week    Drug use: Yes     Types: Marijuana      Comment: quit 3 months ago 6/2018    Sexual activity: Yes     Partners: Male     Birth control/ protection: Surgical      Comment: S/P tubal ligation     Other Topics Concern    None     Social History Narrative    None      reports that she uses drugs, including Marijuana. REVIEW OF SYSTEMS:  Review of Systems   Constitutional: Negative for fever. Eyes: Negative for blurred vision. Respiratory: Positive for shortness of breath. Cardiovascular: Negative for chest pain. Gastrointestinal: Negative for heartburn. Musculoskeletal: Positive for back pain and neck pain. Skin: Negative for rash. Neurological: Negative for headaches. Endo/Heme/Allergies: Does not bruise/bleed easily. Psychiatric/Behavioral: Positive for depression. Negative for suicidal ideas. Objective:  General Appearance:  Uncomfortable and in pain (Morbidly obese). Vital signs: (most recent): Blood pressure 132/83, pulse 83, temperature 97.7 °F (36.5 °C), resp. rate 16, height 5' 4.5\" (1.638 m), weight 275 lb (124.7 kg), not currently breastfeeding. No fever. HEENT: Normal HEENT exam.    Lungs:  Normal effort and normal respiratory rate. Heart: Normal rate. Neurological: Patient is alert and oriented to person, place and time. Normal strength.

## 2018-09-28 DIAGNOSIS — M79.674 PAIN OF TOE OF RIGHT FOOT: ICD-10-CM

## 2018-09-28 DIAGNOSIS — R60.0 BILATERAL EDEMA OF LOWER EXTREMITY: ICD-10-CM

## 2018-10-01 LAB — CYTOLOGY REPORT: NORMAL

## 2018-10-01 RX ORDER — POTASSIUM CHLORIDE 750 MG/1
CAPSULE, EXTENDED RELEASE ORAL
Qty: 28 CAPSULE | Refills: 0 | Status: SHIPPED | OUTPATIENT
Start: 2018-10-01 | End: 2018-10-29 | Stop reason: SDUPTHER

## 2018-10-01 RX ORDER — MELOXICAM 15 MG/1
TABLET ORAL
Qty: 30 TABLET | Refills: 0 | Status: SHIPPED | OUTPATIENT
Start: 2018-10-01 | End: 2018-10-10 | Stop reason: HOSPADM

## 2018-10-01 RX ORDER — GABAPENTIN 300 MG/1
CAPSULE ORAL
Qty: 90 CAPSULE | Refills: 0 | Status: SHIPPED | OUTPATIENT
Start: 2018-10-01 | End: 2018-10-29 | Stop reason: SDUPTHER

## 2018-10-01 RX ORDER — FUROSEMIDE 20 MG/1
TABLET ORAL
Qty: 60 TABLET | Refills: 0 | Status: SHIPPED | OUTPATIENT
Start: 2018-10-01 | End: 2018-11-21 | Stop reason: SDUPTHER

## 2018-10-01 NOTE — TELEPHONE ENCOUNTER
Escribe request for meloxicam, potassium chloride and gabapentin . Please escribe if appropriate      Next Visit Date:  12/3/18     Health Maintenance   Topic Date Due    Diabetic retinal exam  12/08/2016    Diabetic foot exam  11/03/2017    A1C test (Diabetic or Prediabetic)  02/08/2019    Diabetic microalbuminuria test  08/15/2019    Lipid screen  08/15/2019    Potassium monitoring  08/15/2019    Creatinine monitoring  08/15/2019    TSH testing  08/20/2019    Cervical cancer screen  09/17/2023    DTaP/Tdap/Td vaccine (2 - Td) 05/27/2024    Flu vaccine  Completed    Pneumococcal med risk  Completed    HIV screen  Completed       Hemoglobin A1C (%)   Date Value   02/08/2018 7.4   10/24/2017 7.4   07/20/2017 9.9 (H)             ( goal A1C is < 7)   Microalb/Crt.  Ratio (mcg/mg creat)   Date Value   08/15/2018 45 (H)     LDL Cholesterol (mg/dL)   Date Value   08/15/2018 70       (goal LDL is <100)   AST (U/L)   Date Value   08/15/2018 19     ALT (U/L)   Date Value   08/15/2018 15     BUN (mg/dL)   Date Value   08/15/2018 11     BP Readings from Last 3 Encounters:   09/25/18 132/83   09/19/18 (!) 126/90   09/17/18 138/76          (goal 120/80)          Patient Active Problem List:     Hypertension     Diabetes mellitus-  insulin depedent type 2     Dyslipidemia     COPD (chronic obstructive pulmonary disease) (HCC)     DELGADO on CPAP     Arthritis     Bipolar disorder (HCC)     Hypothyroid     Hemorrhoids     Morbid obesity ([address]     Lower extremity edema     Neck pain     Lumbosacral spondylosis without myelopathy     S/P laparoscopic hernia repair     Candidiasis, cutaneous with superadded bacterial infection     Morbid obesity with BMI of 45.0-49.9, adult ([address]     Psychiatric illness     Chronic prescription benzodiazepine use     Chronic bilateral low back pain without sciatica     Bilateral leg pain     Tail bone pain

## 2018-10-10 ENCOUNTER — HOSPITAL ENCOUNTER (OUTPATIENT)
Dept: PAIN MANAGEMENT | Age: 48
Discharge: HOME | End: 2018-10-10
Payer: MEDICARE

## 2018-10-10 VITALS
HEART RATE: 85 BPM | SYSTOLIC BLOOD PRESSURE: 131 MMHG | BODY MASS INDEX: 45.82 KG/M2 | RESPIRATION RATE: 16 BRPM | WEIGHT: 275 LBS | HEIGHT: 65 IN | TEMPERATURE: 98 F | DIASTOLIC BLOOD PRESSURE: 85 MMHG | OXYGEN SATURATION: 93 %

## 2018-10-10 DIAGNOSIS — F99 PSYCHIATRIC ILLNESS: ICD-10-CM

## 2018-10-10 DIAGNOSIS — M54.50 CHRONIC BILATERAL LOW BACK PAIN WITHOUT SCIATICA: ICD-10-CM

## 2018-10-10 DIAGNOSIS — Z79.899 CHRONIC PRESCRIPTION BENZODIAZEPINE USE: ICD-10-CM

## 2018-10-10 DIAGNOSIS — M47.817 LUMBOSACRAL SPONDYLOSIS WITHOUT MYELOPATHY: Primary | ICD-10-CM

## 2018-10-10 DIAGNOSIS — G89.29 CHRONIC BILATERAL LOW BACK PAIN WITHOUT SCIATICA: ICD-10-CM

## 2018-10-10 DIAGNOSIS — E66.01 MORBID OBESITY WITH BMI OF 45.0-49.9, ADULT (HCC): ICD-10-CM

## 2018-10-10 DIAGNOSIS — M79.604 BILATERAL LEG PAIN: ICD-10-CM

## 2018-10-10 DIAGNOSIS — M54.9 CHRONIC BACK PAIN, UNSPECIFIED BACK LOCATION, UNSPECIFIED BACK PAIN LATERALITY: ICD-10-CM

## 2018-10-10 DIAGNOSIS — G89.29 CHRONIC BACK PAIN, UNSPECIFIED BACK LOCATION, UNSPECIFIED BACK PAIN LATERALITY: ICD-10-CM

## 2018-10-10 DIAGNOSIS — Z99.89 OSA ON CPAP: ICD-10-CM

## 2018-10-10 DIAGNOSIS — G47.33 OSA ON CPAP: ICD-10-CM

## 2018-10-10 DIAGNOSIS — M79.605 BILATERAL LEG PAIN: ICD-10-CM

## 2018-10-10 DIAGNOSIS — M53.3 TAIL BONE PAIN: ICD-10-CM

## 2018-10-10 LAB — GLUCOSE BLD-MCNC: 95 MG/DL (ref 65–105)

## 2018-10-10 PROCEDURE — 64494 INJ PARAVERT F JNT L/S 2 LEV: CPT

## 2018-10-10 PROCEDURE — 64493 INJ PARAVERT F JNT L/S 1 LEV: CPT | Performed by: ANESTHESIOLOGY

## 2018-10-10 PROCEDURE — 82947 ASSAY GLUCOSE BLOOD QUANT: CPT

## 2018-10-10 PROCEDURE — 64493 INJ PARAVERT F JNT L/S 1 LEV: CPT

## 2018-10-10 PROCEDURE — 64494 INJ PARAVERT F JNT L/S 2 LEV: CPT | Performed by: ANESTHESIOLOGY

## 2018-10-10 PROCEDURE — 64495 INJ PARAVERT F JNT L/S 3 LEV: CPT

## 2018-10-10 PROCEDURE — 64495 INJ PARAVERT F JNT L/S 3 LEV: CPT | Performed by: ANESTHESIOLOGY

## 2018-10-10 PROCEDURE — 6360000002 HC RX W HCPCS: Performed by: ANESTHESIOLOGY

## 2018-10-10 PROCEDURE — 99152 MOD SED SAME PHYS/QHP 5/>YRS: CPT | Performed by: ANESTHESIOLOGY

## 2018-10-10 PROCEDURE — 2500000003 HC RX 250 WO HCPCS

## 2018-10-10 RX ORDER — MIDAZOLAM HYDROCHLORIDE 1 MG/ML
INJECTION INTRAMUSCULAR; INTRAVENOUS
Status: COMPLETED | OUTPATIENT
Start: 2018-10-10 | End: 2018-10-10

## 2018-10-10 RX ADMIN — MIDAZOLAM HYDROCHLORIDE 1 MG: 1 INJECTION, SOLUTION INTRAMUSCULAR; INTRAVENOUS at 08:04

## 2018-10-10 ASSESSMENT — PAIN - FUNCTIONAL ASSESSMENT
PAIN_FUNCTIONAL_ASSESSMENT: 0-10
PAIN_FUNCTIONAL_ASSESSMENT: 0-10

## 2018-10-10 ASSESSMENT — PAIN DESCRIPTION - DESCRIPTORS: DESCRIPTORS: CONSTANT;STABBING

## 2018-10-10 ASSESSMENT — PAIN SCALES - GENERAL
PAINLEVEL_OUTOF10: 6
PAINLEVEL_OUTOF10: 6

## 2018-10-10 NOTE — OP NOTE
Patient Name: Naomi Cancino   YOB: 1970  Room/Bed: Room/bed info not found  Medical Record Number: [id number]  Date: 10/10/2018       Sedation/ Anesthesia Plan:   intravenous sedation   as needed. Medications Planned:   midazolam (Versed) / Fentanyl  Intravenously  as needed. Preoperative Diagnosis: Lumbar spondylosis w/o myelopathy or radiculopathy  Postoperative Diagnosis: Lumbar spondylosis w/o myelopathy or radiculopathy    Procedure Performed:  Bilateral Lumbar Medial Branch nerve Blocks at the transverse processes of L3, L4, L5 and sacral  ala under fluoroscopy guidance    Procedure: The Patient was seen in the preop area, chart was reviewed, informed consent was obtained. Patient was taken to procedure room and was placed in prone position. Vital signs were monitored through out the  Procedure. A time out was completed. The skin over the back was prepped and draped in sterile manner. The target point was marked at the junction of Transverse process and superior articular process at the target levels. Skin and deep tissues were anesthetized with 1 % lidocaine. A 25-gauge needlele was advanced to the target spots under fluoroscopy guidance in AP / Lateral and Oblique views. Finally 0.5 ml of treatment solution 0.5 % Bupivacaine  was injected at each level. The needle was removed and a Band-Aid was placed over the needle  insertion site. The patient's vital signs remained stable and the patient tolerated the procedure well. Post Procedure pain score in PACU was assessed with ambulation 0-1/10 down from 8/10 in preop.     Electronically signed by See Choe MD on 10/10/2018 at 8:20 AM

## 2018-10-23 ENCOUNTER — HOSPITAL ENCOUNTER (OUTPATIENT)
Dept: PAIN MANAGEMENT | Age: 48
Discharge: HOME | End: 2018-10-23
Payer: MEDICARE

## 2018-10-23 VITALS
TEMPERATURE: 98.4 F | RESPIRATION RATE: 16 BRPM | DIASTOLIC BLOOD PRESSURE: 79 MMHG | OXYGEN SATURATION: 91 % | HEART RATE: 89 BPM | SYSTOLIC BLOOD PRESSURE: 123 MMHG

## 2018-10-23 DIAGNOSIS — M54.9 CHRONIC BACK PAIN, UNSPECIFIED BACK LOCATION, UNSPECIFIED BACK PAIN LATERALITY: ICD-10-CM

## 2018-10-23 DIAGNOSIS — M53.3 CHRONIC SACROILIAC JOINT PAIN: Chronic | ICD-10-CM

## 2018-10-23 DIAGNOSIS — M54.50 CHRONIC BILATERAL LOW BACK PAIN WITHOUT SCIATICA: Primary | Chronic | ICD-10-CM

## 2018-10-23 DIAGNOSIS — G89.29 CHRONIC BACK PAIN, UNSPECIFIED BACK LOCATION, UNSPECIFIED BACK PAIN LATERALITY: ICD-10-CM

## 2018-10-23 DIAGNOSIS — G89.29 CHRONIC BILATERAL LOW BACK PAIN WITHOUT SCIATICA: Primary | Chronic | ICD-10-CM

## 2018-10-23 DIAGNOSIS — G89.29 CHRONIC SACROILIAC JOINT PAIN: Chronic | ICD-10-CM

## 2018-10-23 LAB
GLUCOSE BLD-MCNC: 166 MG/DL (ref 65–105)
HCG, PREGNANCY URINE (POC): NEGATIVE

## 2018-10-23 PROCEDURE — G0260 INJ FOR SACROILIAC JT ANESTH: HCPCS

## 2018-10-23 PROCEDURE — 82947 ASSAY GLUCOSE BLOOD QUANT: CPT

## 2018-10-23 PROCEDURE — 84703 CHORIONIC GONADOTROPIN ASSAY: CPT

## 2018-10-23 PROCEDURE — 99152 MOD SED SAME PHYS/QHP 5/>YRS: CPT | Performed by: ANESTHESIOLOGY

## 2018-10-23 PROCEDURE — 6360000002 HC RX W HCPCS: Performed by: ANESTHESIOLOGY

## 2018-10-23 PROCEDURE — 27096 INJECT SACROILIAC JOINT: CPT | Performed by: ANESTHESIOLOGY

## 2018-10-23 RX ORDER — MIDAZOLAM HYDROCHLORIDE 1 MG/ML
INJECTION INTRAMUSCULAR; INTRAVENOUS
Status: COMPLETED | OUTPATIENT
Start: 2018-10-23 | End: 2018-10-23

## 2018-10-23 RX ORDER — FENTANYL CITRATE 50 UG/ML
INJECTION, SOLUTION INTRAMUSCULAR; INTRAVENOUS
Status: COMPLETED | OUTPATIENT
Start: 2018-10-23 | End: 2018-10-23

## 2018-10-23 RX ADMIN — MIDAZOLAM HYDROCHLORIDE 1 MG: 1 INJECTION, SOLUTION INTRAMUSCULAR; INTRAVENOUS at 08:09

## 2018-10-23 RX ADMIN — FENTANYL CITRATE 50 MCG: 50 INJECTION INTRAMUSCULAR; INTRAVENOUS at 08:09

## 2018-10-23 ASSESSMENT — PAIN SCALES - GENERAL: PAINLEVEL_OUTOF10: 0

## 2018-10-23 ASSESSMENT — PAIN - FUNCTIONAL ASSESSMENT: PAIN_FUNCTIONAL_ASSESSMENT: 0-10

## 2018-10-23 ASSESSMENT — PAIN DESCRIPTION - DESCRIPTORS: DESCRIPTORS: CONSTANT;ACHING;POUNDING

## 2018-10-29 ENCOUNTER — OFFICE VISIT (OUTPATIENT)
Dept: INTERNAL MEDICINE | Age: 48
End: 2018-10-29
Payer: MEDICARE

## 2018-10-29 VITALS
BODY MASS INDEX: 44.98 KG/M2 | TEMPERATURE: 99 F | HEIGHT: 65 IN | HEART RATE: 80 BPM | WEIGHT: 270 LBS | OXYGEN SATURATION: 95 % | SYSTOLIC BLOOD PRESSURE: 113 MMHG | DIASTOLIC BLOOD PRESSURE: 71 MMHG

## 2018-10-29 DIAGNOSIS — Z99.89 OSA ON CPAP: Chronic | ICD-10-CM

## 2018-10-29 DIAGNOSIS — F31.62 BIPOLAR DISORDER, CURRENT EPISODE MIXED, MODERATE (HCC): ICD-10-CM

## 2018-10-29 DIAGNOSIS — J42 CHRONIC BRONCHITIS, UNSPECIFIED CHRONIC BRONCHITIS TYPE (HCC): Chronic | ICD-10-CM

## 2018-10-29 DIAGNOSIS — Z79.4 TYPE 2 DIABETES MELLITUS WITH HYPERGLYCEMIA, WITH LONG-TERM CURRENT USE OF INSULIN (HCC): Primary | Chronic | ICD-10-CM

## 2018-10-29 DIAGNOSIS — M47.817 LUMBOSACRAL SPONDYLOSIS WITHOUT MYELOPATHY: ICD-10-CM

## 2018-10-29 DIAGNOSIS — E03.9 HYPOTHYROIDISM, UNSPECIFIED TYPE: Chronic | ICD-10-CM

## 2018-10-29 DIAGNOSIS — E11.65 TYPE 2 DIABETES MELLITUS WITH HYPERGLYCEMIA, WITH LONG-TERM CURRENT USE OF INSULIN (HCC): Primary | Chronic | ICD-10-CM

## 2018-10-29 DIAGNOSIS — G47.33 OSA ON CPAP: Chronic | ICD-10-CM

## 2018-10-29 DIAGNOSIS — J06.9 UPPER RESPIRATORY TRACT INFECTION, UNSPECIFIED TYPE: ICD-10-CM

## 2018-10-29 PROBLEM — M79.604 BILATERAL LEG PAIN: Chronic | Status: RESOLVED | Noted: 2018-09-25 | Resolved: 2018-10-29

## 2018-10-29 PROBLEM — M79.605 BILATERAL LEG PAIN: Chronic | Status: RESOLVED | Noted: 2018-09-25 | Resolved: 2018-10-29

## 2018-10-29 PROBLEM — M54.50 CHRONIC BILATERAL LOW BACK PAIN WITHOUT SCIATICA: Chronic | Status: RESOLVED | Noted: 2018-09-25 | Resolved: 2018-10-29

## 2018-10-29 PROBLEM — G89.29 CHRONIC BILATERAL LOW BACK PAIN WITHOUT SCIATICA: Chronic | Status: RESOLVED | Noted: 2018-09-25 | Resolved: 2018-10-29

## 2018-10-29 PROBLEM — M53.3 TAIL BONE PAIN: Chronic | Status: RESOLVED | Noted: 2018-09-25 | Resolved: 2018-10-29

## 2018-10-29 PROBLEM — B37.2 CANDIDIASIS, CUTANEOUS: Status: RESOLVED | Noted: 2018-04-12 | Resolved: 2018-10-29

## 2018-10-29 PROCEDURE — 99213 OFFICE O/P EST LOW 20 MIN: CPT | Performed by: INTERNAL MEDICINE

## 2018-10-29 PROCEDURE — 99211 OFF/OP EST MAY X REQ PHY/QHP: CPT | Performed by: INTERNAL MEDICINE

## 2018-10-29 PROCEDURE — 3023F SPIROM DOC REV: CPT | Performed by: INTERNAL MEDICINE

## 2018-10-29 PROCEDURE — 2022F DILAT RTA XM EVC RTNOPTHY: CPT | Performed by: INTERNAL MEDICINE

## 2018-10-29 PROCEDURE — G8926 SPIRO NO PERF OR DOC: HCPCS | Performed by: INTERNAL MEDICINE

## 2018-10-29 PROCEDURE — G8417 CALC BMI ABV UP PARAM F/U: HCPCS | Performed by: INTERNAL MEDICINE

## 2018-10-29 PROCEDURE — G8482 FLU IMMUNIZE ORDER/ADMIN: HCPCS | Performed by: INTERNAL MEDICINE

## 2018-10-29 PROCEDURE — 1036F TOBACCO NON-USER: CPT | Performed by: INTERNAL MEDICINE

## 2018-10-29 PROCEDURE — 3045F PR MOST RECENT HEMOGLOBIN A1C LEVEL 7.0-9.0%: CPT | Performed by: INTERNAL MEDICINE

## 2018-10-29 PROCEDURE — G8427 DOCREV CUR MEDS BY ELIG CLIN: HCPCS | Performed by: INTERNAL MEDICINE

## 2018-10-29 RX ORDER — HYDROXYZINE PAMOATE 25 MG/1
25-75 CAPSULE ORAL
COMMUNITY
End: 2022-08-02

## 2018-10-29 RX ORDER — METHYLPREDNISOLONE 4 MG/1
TABLET ORAL
Refills: 0 | COMMUNITY
Start: 2018-10-02 | End: 2018-10-29

## 2018-10-29 RX ORDER — FLUTICASONE PROPIONATE 50 MCG
1 SPRAY, SUSPENSION (ML) NASAL DAILY
Qty: 1 BOTTLE | Refills: 0 | Status: SHIPPED | OUTPATIENT
Start: 2018-10-29 | End: 2018-12-03 | Stop reason: SDUPTHER

## 2018-10-29 RX ORDER — POLYETHYLENE GLYCOL 3350 17 G/17G
17 POWDER, FOR SOLUTION ORAL
COMMUNITY
Start: 2018-10-28 | End: 2018-10-29

## 2018-10-29 RX ORDER — GUAIFENESIN/DEXTROMETHORPHAN 100-10MG/5
5 SYRUP ORAL 3 TIMES DAILY PRN
Qty: 120 ML | Refills: 0 | Status: SHIPPED | OUTPATIENT
Start: 2018-10-29 | End: 2018-11-08

## 2018-10-29 RX ORDER — HYDROCODONE BITARTRATE AND ACETAMINOPHEN 5; 325 MG/1; MG/1
TABLET ORAL
Refills: 0 | COMMUNITY
Start: 2018-10-02 | End: 2018-12-03

## 2018-10-29 RX ORDER — LANOLIN ALCOHOL/MO/W.PET/CERES
325 CREAM (GRAM) TOPICAL 2 TIMES DAILY
Qty: 60 TABLET | Refills: 3 | Status: SHIPPED | OUTPATIENT
Start: 2018-10-29 | End: 2019-02-18 | Stop reason: SDUPTHER

## 2018-10-30 NOTE — PROGRESS NOTES
COMFORT EZ PEN NEEDLES 32G X 6 MM MISC USE AS DIRECTED DAILY 100 each 0     No current facility-administered medications for this visit. REVIEW OF SYSTEMS:  HENT: Negative for nosebleeds. Respiratory: Negative for  shortness of breath, wheezing and stridor. Cardiovascular: Negative for chest pain, palpitations and leg swelling. Gastrointestinal: Negative for nausea, vomiting, abdominal pain, diarrhea and constipation. PHYSICAL EXAM:  Vitals:    10/29/18 1244 10/29/18 1331   BP: 113/71    Site: Left Upper Arm    Position: Sitting    Cuff Size: Large Adult    Pulse: 80    Temp: 97.8 °F (36.6 °C) 99 °F (37.2 °C)   TempSrc: Oral    SpO2:  95%   Weight: 270 lb (122.5 kg)    Height: 5' 5\" (1.651 m)        Constitutional: She is oriented to person, place, and time. She appears well-developed. No distress. Minimal pharyngeal erythema present. Cardiovascular: Normal rate and regular rhythm. Pulmonary/Chest: Effort normal and breath sounds normal. No stridor. No respiratory distress. no wheezes. no rales. Abdominal: Soft. Bowel sounds are normal.  no distension. There is no tenderness. There is no rebound and no guarding. Musculoskeletal:  no edema and no tenderness. DIAGNOSTIC FINDINGS:  CBC:  Lab Results   Component Value Date    WBC 10.7 08/20/2018    HGB 11.0 08/20/2018     08/20/2018     04/10/2012       BMP:    Lab Results   Component Value Date     08/15/2018    K 4.5 08/15/2018    CL 96 08/15/2018    CO2 25 08/15/2018    BUN 11 08/15/2018    CREATININE 0.57 08/15/2018    GLUCOSE 196 08/15/2018    GLUCOSE 159 07/27/2018       HEMOGLOBIN A1C:   Lab Results   Component Value Date    LABA1C 7.4 02/08/2018       FASTING LIPID PANEL:  Lab Results   Component Value Date    CHOL 140 07/20/2017    CHOL 136 07/20/2017    CHOL 135 07/20/2017    HDL 54 08/15/2018    TRIG 173 (H) 07/20/2017    TRIG 169 (H) 07/20/2017    TRIG 170 (H) 07/20/2017       ASSESSMENT :  1.  Type

## 2018-11-02 ENCOUNTER — TELEPHONE (OUTPATIENT)
Dept: INTERNAL MEDICINE | Age: 48
End: 2018-11-02

## 2018-11-02 RX ORDER — GUAIFENESIN/DEXTROMETHORPHAN 100-10MG/5
5 SYRUP ORAL 3 TIMES DAILY PRN
Qty: 120 ML | Refills: 0 | Status: SHIPPED | OUTPATIENT
Start: 2018-11-02 | End: 2018-11-12

## 2018-11-02 RX ORDER — AZITHROMYCIN 250 MG/1
TABLET, FILM COATED ORAL
Qty: 1 PACKET | Refills: 0 | Status: SHIPPED | OUTPATIENT
Start: 2018-11-02 | End: 2018-11-06

## 2018-11-05 LAB — HBA1C MFR BLD: 8.8 %

## 2018-11-21 DIAGNOSIS — M79.674 PAIN OF TOE OF RIGHT FOOT: ICD-10-CM

## 2018-11-21 RX ORDER — OMEPRAZOLE 40 MG/1
CAPSULE, DELAYED RELEASE ORAL
Qty: 30 CAPSULE | Refills: 0 | Status: SHIPPED | OUTPATIENT
Start: 2018-11-21 | End: 2018-12-19 | Stop reason: SDUPTHER

## 2018-11-21 NOTE — TELEPHONE ENCOUNTER
E-scribe request for OMEPRAZOLE 40MG* CAPS. Please review and e-scribe if applicable. Last Visit Date:  10/29/18  Next Visit Date:  12/3/2018    Hemoglobin A1C (%)   Date Value   02/08/2018 7.4   10/24/2017 7.4   07/20/2017 9.9 (H)             ( goal A1C is < 7)   Microalb/Crt.  Ratio (mcg/mg creat)   Date Value   08/15/2018 45 (H)     LDL Cholesterol (mg/dL)   Date Value   08/15/2018 70       (goal LDL is <100)   AST (U/L)   Date Value   08/15/2018 19     ALT (U/L)   Date Value   08/15/2018 15     BUN (mg/dL)   Date Value   11/04/2018 16     BP Readings from Last 3 Encounters:   10/29/18 113/71   10/23/18 123/79   10/10/18 131/85          (goal 120/80)        Patient Active Problem List:     Hypertension     Diabetes mellitus-  insulin depedent type 2     Dyslipidemia     COPD (chronic obstructive pulmonary disease) (HCC)     DELGADO on CPAP     Arthritis     Bipolar disorder (HCC)     Hypothyroid     Hemorrhoids     Morbid obesity ([address]     Lower extremity edema     Lumbosacral spondylosis without myelopathy     S/P laparoscopic hernia repair     Morbid obesity with BMI of 45.0-49.9, adult ([address]     Psychiatric illness     Chronic prescription benzodiazepine use     Chronic sacroiliac joint pain      ----JF

## 2018-11-27 ENCOUNTER — TELEPHONE (OUTPATIENT)
Dept: INTERNAL MEDICINE | Age: 48
End: 2018-11-27

## 2018-12-03 ENCOUNTER — OFFICE VISIT (OUTPATIENT)
Dept: INTERNAL MEDICINE | Age: 48
End: 2018-12-03
Payer: MEDICARE

## 2018-12-03 VITALS
HEART RATE: 94 BPM | SYSTOLIC BLOOD PRESSURE: 144 MMHG | WEIGHT: 270 LBS | BODY MASS INDEX: 44.98 KG/M2 | HEIGHT: 65 IN | DIASTOLIC BLOOD PRESSURE: 99 MMHG

## 2018-12-03 DIAGNOSIS — I10 ESSENTIAL HYPERTENSION: Chronic | ICD-10-CM

## 2018-12-03 DIAGNOSIS — F31.62 BIPOLAR DISORDER, CURRENT EPISODE MIXED, MODERATE (HCC): ICD-10-CM

## 2018-12-03 DIAGNOSIS — Z79.4 TYPE 2 DIABETES MELLITUS WITH HYPERGLYCEMIA, WITH LONG-TERM CURRENT USE OF INSULIN (HCC): Chronic | ICD-10-CM

## 2018-12-03 DIAGNOSIS — E11.65 TYPE 2 DIABETES MELLITUS WITH HYPERGLYCEMIA, WITH LONG-TERM CURRENT USE OF INSULIN (HCC): Chronic | ICD-10-CM

## 2018-12-03 DIAGNOSIS — E03.9 HYPOTHYROIDISM, UNSPECIFIED TYPE: Chronic | ICD-10-CM

## 2018-12-03 DIAGNOSIS — J42 CHRONIC BRONCHITIS, UNSPECIFIED CHRONIC BRONCHITIS TYPE (HCC): Primary | Chronic | ICD-10-CM

## 2018-12-03 DIAGNOSIS — E78.5 DYSLIPIDEMIA: Chronic | ICD-10-CM

## 2018-12-03 DIAGNOSIS — R07.0 THROAT PAIN: ICD-10-CM

## 2018-12-03 PROBLEM — F99 PSYCHIATRIC ILLNESS: Chronic | Status: RESOLVED | Noted: 2018-09-25 | Resolved: 2018-12-03

## 2018-12-03 LAB — S PYO AG THROAT QL: NORMAL

## 2018-12-03 PROCEDURE — G8417 CALC BMI ABV UP PARAM F/U: HCPCS | Performed by: INTERNAL MEDICINE

## 2018-12-03 PROCEDURE — 99213 OFFICE O/P EST LOW 20 MIN: CPT | Performed by: INTERNAL MEDICINE

## 2018-12-03 PROCEDURE — G8482 FLU IMMUNIZE ORDER/ADMIN: HCPCS | Performed by: INTERNAL MEDICINE

## 2018-12-03 PROCEDURE — G8926 SPIRO NO PERF OR DOC: HCPCS | Performed by: INTERNAL MEDICINE

## 2018-12-03 PROCEDURE — 3045F PR MOST RECENT HEMOGLOBIN A1C LEVEL 7.0-9.0%: CPT | Performed by: INTERNAL MEDICINE

## 2018-12-03 PROCEDURE — 2022F DILAT RTA XM EVC RTNOPTHY: CPT | Performed by: INTERNAL MEDICINE

## 2018-12-03 PROCEDURE — G8427 DOCREV CUR MEDS BY ELIG CLIN: HCPCS | Performed by: INTERNAL MEDICINE

## 2018-12-03 PROCEDURE — 1036F TOBACCO NON-USER: CPT | Performed by: INTERNAL MEDICINE

## 2018-12-03 PROCEDURE — 87880 STREP A ASSAY W/OPTIC: CPT | Performed by: INTERNAL MEDICINE

## 2018-12-03 PROCEDURE — 3023F SPIROM DOC REV: CPT | Performed by: INTERNAL MEDICINE

## 2018-12-03 RX ORDER — GUAIFENESIN/DEXTROMETHORPHAN 100-10MG/5
10 SYRUP ORAL EVERY 6 HOURS PRN
Qty: 250 ML | Refills: 1 | Status: SHIPPED | OUTPATIENT
Start: 2018-12-03 | End: 2019-02-18 | Stop reason: ALTCHOICE

## 2018-12-03 RX ORDER — FLUTICASONE PROPIONATE 50 MCG
1 SPRAY, SUSPENSION (ML) NASAL DAILY
Qty: 1 BOTTLE | Refills: 0 | Status: SHIPPED | OUTPATIENT
Start: 2018-12-03 | End: 2022-08-02

## 2018-12-03 RX ORDER — LEVOFLOXACIN 500 MG/1
TABLET, FILM COATED ORAL
Refills: 0 | COMMUNITY
Start: 2018-11-06 | End: 2018-12-03

## 2018-12-03 RX ORDER — GUAIFENESIN/DEXTROMETHORPHAN 100-10MG/5
SYRUP ORAL
COMMUNITY
Start: 2018-10-29 | End: 2018-12-03 | Stop reason: SDUPTHER

## 2018-12-03 NOTE — PROGRESS NOTES
current use of insulin ([facility]    3. Bipolar disorder, current episode mixed, moderate ([address]    4. Essential hypertension    5. Dyslipidemia    6. Hypothyroidism, unspecified type    7. Throat pain        PLAN:  1. Continue current inhalers. She does not smoke anymore. Will refill cough medication  2. Continue follow-up with endocrinology  3. Continue follow-up at the [facility].  4. Continue lisinopril, atenolol. 5.  Continue Lipitor  6. Continue Synthroid        FOLLOW UP AND INSTRUCTIONS:  Return in about 2 months (around 2/3/2019). Discussed use, benefit, and side effects of prescribed medications. Barriers to medication compliance addressed. All patient questions answered. Pt voiced understanding. [address] [place] [address] Internal Medicine Associate  12/3/2018, 12:07 PM    This note is created with the assistance of a speech-recognition program. While intending to generate a document that actually reflects the content of the visit, the document can still have some mistakes which may not have been identified and corrected by editing.

## 2018-12-11 ENCOUNTER — TELEPHONE (OUTPATIENT)
Dept: UROLOGY | Age: 48
End: 2018-12-11

## 2018-12-13 ENCOUNTER — OFFICE VISIT (OUTPATIENT)
Dept: INTERNAL MEDICINE | Age: 48
End: 2018-12-13
Payer: MEDICARE

## 2018-12-13 VITALS
SYSTOLIC BLOOD PRESSURE: 119 MMHG | DIASTOLIC BLOOD PRESSURE: 69 MMHG | HEIGHT: 66 IN | BODY MASS INDEX: 43.39 KG/M2 | HEART RATE: 95 BPM | WEIGHT: 270 LBS

## 2018-12-13 DIAGNOSIS — N63.20 LEFT BREAST MASS: ICD-10-CM

## 2018-12-13 DIAGNOSIS — E11.65 TYPE 2 DIABETES MELLITUS WITH HYPERGLYCEMIA, WITH LONG-TERM CURRENT USE OF INSULIN (HCC): Chronic | ICD-10-CM

## 2018-12-13 DIAGNOSIS — Z79.4 TYPE 2 DIABETES MELLITUS WITH HYPERGLYCEMIA, WITH LONG-TERM CURRENT USE OF INSULIN (HCC): Chronic | ICD-10-CM

## 2018-12-13 DIAGNOSIS — N64.9 DISORDER OF BREAST: ICD-10-CM

## 2018-12-13 DIAGNOSIS — N39.41 URGE INCONTINENCE OF URINE: Primary | ICD-10-CM

## 2018-12-13 LAB
BILIRUBIN, POC: ABNORMAL
BLOOD URINE, POC: ABNORMAL
CLARITY, POC: ABNORMAL
COLOR, POC: CLEAR
GLUCOSE URINE, POC: ABNORMAL
KETONES, POC: ABNORMAL
LEUKOCYTE EST, POC: ABNORMAL
NITRITE, POC: ABNORMAL
PH, POC: 5
PROTEIN, POC: ABNORMAL
SPECIFIC GRAVITY, POC: 1.01
UROBILINOGEN, POC: 0.2

## 2018-12-13 PROCEDURE — G8417 CALC BMI ABV UP PARAM F/U: HCPCS | Performed by: INTERNAL MEDICINE

## 2018-12-13 PROCEDURE — 99211 OFF/OP EST MAY X REQ PHY/QHP: CPT | Performed by: INTERNAL MEDICINE

## 2018-12-13 PROCEDURE — G8482 FLU IMMUNIZE ORDER/ADMIN: HCPCS | Performed by: INTERNAL MEDICINE

## 2018-12-13 PROCEDURE — 3045F PR MOST RECENT HEMOGLOBIN A1C LEVEL 7.0-9.0%: CPT | Performed by: INTERNAL MEDICINE

## 2018-12-13 PROCEDURE — 2022F DILAT RTA XM EVC RTNOPTHY: CPT | Performed by: INTERNAL MEDICINE

## 2018-12-13 PROCEDURE — G8427 DOCREV CUR MEDS BY ELIG CLIN: HCPCS | Performed by: INTERNAL MEDICINE

## 2018-12-13 PROCEDURE — 1036F TOBACCO NON-USER: CPT | Performed by: INTERNAL MEDICINE

## 2018-12-13 PROCEDURE — 99213 OFFICE O/P EST LOW 20 MIN: CPT | Performed by: INTERNAL MEDICINE

## 2018-12-13 PROCEDURE — 81003 URINALYSIS AUTO W/O SCOPE: CPT | Performed by: INTERNAL MEDICINE

## 2018-12-13 RX ORDER — OXYBUTYNIN CHLORIDE 5 MG/1
5 TABLET ORAL 2 TIMES DAILY
Qty: 60 TABLET | Refills: 3 | Status: SHIPPED | OUTPATIENT
Start: 2018-12-13 | End: 2019-01-09 | Stop reason: SDUPTHER

## 2018-12-13 RX ORDER — CIPROFLOXACIN 500 MG/1
500 TABLET, FILM COATED ORAL 2 TIMES DAILY
Qty: 10 TABLET | Refills: 0 | Status: SHIPPED | OUTPATIENT
Start: 2018-12-13 | End: 2018-12-18

## 2018-12-13 NOTE — PROGRESS NOTES
drops Place 1 drop into both eyes nightly        No current facility-administered medications for this visit. REVIEW OF SYSTEMS:   Negative for fever  Respiratory: Negative for cough, shortness of breath, wheezing and stridor. Cardiovascular: Negative for chest pain, palpitations and leg swelling. Gastrointestinal: Negative for nausea, vomiting, abdominal pain, diarrhea and constipation. Genitourinary: Negative for hematuria. PHYSICAL EXAM:  Vitals:    12/13/18 1309   BP: 119/69   Site: Left Upper Arm   Position: Sitting   Cuff Size: Large Adult   Pulse: 95   Weight: 270 lb (122.5 kg)   Height: 5' 5.5\" (1.664 m)       Constitutional: She is oriented to person, place, and time. She appears well-developed. No distress. Left breast- small mobile swelling present above the nipple to the left  No tenderness. No nipple discharge  Cardiovascular: Normal rate and regular rhythm. Pulmonary/Chest: Effort normal and breath sounds normal. No stridor. No respiratory distress. no wheezes. no rales. Abdominal: Soft. Bowel sounds are normal.  no distension. There is no tenderness. There is no rebound and no guarding. Musculoskeletal:  no edema and no tenderness. DIAGNOSTIC FINDINGS:  CBC:  Lab Results   Component Value Date    WBC 9.24 11/04/2018    WBC 10.7 08/20/2018    HGB 10.8 11/04/2018     11/04/2018     04/10/2012       BMP:    Lab Results   Component Value Date     11/04/2018    K 4.4 11/04/2018    CL 92 11/04/2018    CO2 27 11/04/2018    BUN 16 11/04/2018    CREATININE 0.83 11/04/2018    GLUCOSE 344 11/04/2018       HEMOGLOBIN A1C:   Lab Results   Component Value Date    LABA1C 8.8 11/05/2018       FASTING LIPID PANEL:  Lab Results   Component Value Date    CHOL 140 07/20/2017    CHOL 136 07/20/2017    CHOL 135 07/20/2017    HDL 54 08/15/2018    TRIG 173 (H) 07/20/2017    TRIG 169 (H) 07/20/2017    TRIG 170 (H) 07/20/2017       ASSESSMENT :  1.  Urge incontinence

## 2018-12-19 ENCOUNTER — TELEPHONE (OUTPATIENT)
Dept: INTERNAL MEDICINE | Age: 48
End: 2018-12-19

## 2018-12-19 DIAGNOSIS — N63.20 LEFT BREAST MASS: Primary | ICD-10-CM

## 2018-12-19 DIAGNOSIS — N64.9 DISORDER OF BREAST: ICD-10-CM

## 2018-12-20 ENCOUNTER — OFFICE VISIT (OUTPATIENT)
Dept: INTERNAL MEDICINE | Age: 48
End: 2018-12-20
Payer: MEDICARE

## 2018-12-20 VITALS
BODY MASS INDEX: 45.98 KG/M2 | SYSTOLIC BLOOD PRESSURE: 128 MMHG | HEIGHT: 65 IN | DIASTOLIC BLOOD PRESSURE: 77 MMHG | WEIGHT: 276 LBS | HEART RATE: 68 BPM

## 2018-12-20 DIAGNOSIS — Z00.00 ANNUAL PHYSICAL EXAM: Primary | ICD-10-CM

## 2018-12-20 PROCEDURE — 99211 OFF/OP EST MAY X REQ PHY/QHP: CPT | Performed by: INTERNAL MEDICINE

## 2018-12-20 PROCEDURE — G8482 FLU IMMUNIZE ORDER/ADMIN: HCPCS | Performed by: INTERNAL MEDICINE

## 2018-12-20 PROCEDURE — 99396 PREV VISIT EST AGE 40-64: CPT | Performed by: INTERNAL MEDICINE

## 2018-12-20 NOTE — PROGRESS NOTES
Visit Information    Have you changed or started any medications since your last visit including any over-the-counter medicines, vitamins, or herbal medicines? no   Have you stopped taking any of your medications? Is so, why? -  no  Are you having any side effects from any of your medications? - no    Have you seen any other physician or provider since your last visit?  no   Have you had any other diagnostic tests since your last visit?  no   Have you been seen in the emergency room and/or had an admission in a hospital since we last saw you?  no   Have you had your routine dental cleaning in the past 6 months?  no     Do you have an active MyChart account? If no, what is the barrier?   No:     Patient Care Team:  Blaise Márquez MD as PCP - General (Internal Medicine)    Medical History Review  Past Medical, Family, and Social History reviewed and does contribute to the patient presenting condition    Health Maintenance   Topic Date Due    Diabetic retinal exam  12/08/2016    Diabetic foot exam  11/03/2017    Diabetic microalbuminuria test  08/15/2019    Lipid screen  08/15/2019    TSH testing  08/20/2019    Potassium monitoring  11/04/2019    Creatinine monitoring  11/04/2019    A1C test (Diabetic or Prediabetic)  11/09/2019    Cervical cancer screen  09/17/2023    DTaP/Tdap/Td vaccine (2 - Td) 05/27/2024    Flu vaccine  Completed    Pneumococcal med risk  Completed    HIV screen  Completed
prescribed medications. Barriers to medication compliance addressed. All patient questions answered. Pt voiced understanding. [facility] Internal Medicine Associate  12/20/2018, 3:31 PM    This note is created with the assistance of a speech-recognition program. While intending to generate a document that actually reflects the content of the visit, the document can still have some mistakes which may not have been identified and corrected by editing.

## 2018-12-28 ENCOUNTER — TELEPHONE (OUTPATIENT)
Dept: ADMINISTRATIVE | Age: 48
End: 2018-12-28

## 2018-12-28 NOTE — TELEPHONE ENCOUNTER
Patient is calling stating she needs a rx sent for her yeast infection powder.  Please call pt at phone number [phone]

## 2018-12-29 RX ORDER — NYSTATIN 100000 [USP'U]/G
POWDER TOPICAL
Qty: 1 BOTTLE | Refills: 0 | Status: SHIPPED | OUTPATIENT
Start: 2018-12-29 | End: 2022-08-02

## 2019-01-09 ENCOUNTER — OFFICE VISIT (OUTPATIENT)
Dept: INTERNAL MEDICINE | Age: 49
End: 2019-01-09
Payer: MEDICARE

## 2019-01-09 VITALS
OXYGEN SATURATION: 98 % | TEMPERATURE: 97.6 F | DIASTOLIC BLOOD PRESSURE: 86 MMHG | WEIGHT: 276 LBS | BODY MASS INDEX: 45.93 KG/M2 | SYSTOLIC BLOOD PRESSURE: 136 MMHG | HEART RATE: 84 BPM

## 2019-01-09 DIAGNOSIS — N39.41 URGE INCONTINENCE: ICD-10-CM

## 2019-01-09 DIAGNOSIS — E66.01 MORBID OBESITY (HCC): ICD-10-CM

## 2019-01-09 DIAGNOSIS — Z99.89 OSA ON CPAP: Chronic | ICD-10-CM

## 2019-01-09 DIAGNOSIS — J42 CHRONIC BRONCHITIS, UNSPECIFIED CHRONIC BRONCHITIS TYPE (HCC): Primary | Chronic | ICD-10-CM

## 2019-01-09 DIAGNOSIS — G47.33 OSA ON CPAP: Chronic | ICD-10-CM

## 2019-01-09 PROCEDURE — G8417 CALC BMI ABV UP PARAM F/U: HCPCS | Performed by: HOSPITALIST

## 2019-01-09 PROCEDURE — G8926 SPIRO NO PERF OR DOC: HCPCS | Performed by: HOSPITALIST

## 2019-01-09 PROCEDURE — 3023F SPIROM DOC REV: CPT | Performed by: HOSPITALIST

## 2019-01-09 PROCEDURE — 1036F TOBACCO NON-USER: CPT | Performed by: HOSPITALIST

## 2019-01-09 PROCEDURE — G8427 DOCREV CUR MEDS BY ELIG CLIN: HCPCS | Performed by: HOSPITALIST

## 2019-01-09 PROCEDURE — 99211 OFF/OP EST MAY X REQ PHY/QHP: CPT | Performed by: INTERNAL MEDICINE

## 2019-01-09 PROCEDURE — G8482 FLU IMMUNIZE ORDER/ADMIN: HCPCS | Performed by: HOSPITALIST

## 2019-01-09 PROCEDURE — 99214 OFFICE O/P EST MOD 30 MIN: CPT | Performed by: HOSPITALIST

## 2019-01-09 RX ORDER — BUDESONIDE AND FORMOTEROL FUMARATE DIHYDRATE 160; 4.5 UG/1; UG/1
2 AEROSOL RESPIRATORY (INHALATION) 2 TIMES DAILY
Qty: 3 INHALER | Refills: 1 | Status: SHIPPED | OUTPATIENT
Start: 2019-01-09 | End: 2019-04-18 | Stop reason: SDUPTHER

## 2019-01-09 RX ORDER — FLUTICASONE FUROATE AND VILANTEROL 200; 25 UG/1; UG/1
1 POWDER RESPIRATORY (INHALATION) DAILY
Qty: 60 EACH | Refills: 3 | Status: CANCELLED | OUTPATIENT
Start: 2019-01-09

## 2019-01-09 RX ORDER — OXYBUTYNIN CHLORIDE 5 MG/1
5 TABLET ORAL 2 TIMES DAILY
Qty: 60 TABLET | Refills: 3 | Status: SHIPPED | OUTPATIENT
Start: 2019-01-09 | End: 2019-02-18 | Stop reason: SDUPTHER

## 2019-01-09 RX ORDER — ALBUTEROL SULFATE 90 UG/1
2 AEROSOL, METERED RESPIRATORY (INHALATION) EVERY 6 HOURS PRN
Qty: 1 INHALER | Refills: 3 | Status: SHIPPED | OUTPATIENT
Start: 2019-01-09 | End: 2022-08-02

## 2019-01-14 RX ORDER — BENZONATATE 100 MG/1
CAPSULE ORAL
Qty: 30 CAPSULE | Refills: 0 | Status: SHIPPED | OUTPATIENT
Start: 2019-01-14 | End: 2019-02-18 | Stop reason: ALTCHOICE

## 2019-01-15 DIAGNOSIS — R60.0 BILATERAL EDEMA OF LOWER EXTREMITY: ICD-10-CM

## 2019-01-15 RX ORDER — GABAPENTIN 300 MG/1
CAPSULE ORAL
Qty: 90 CAPSULE | Refills: 2 | Status: SHIPPED | OUTPATIENT
Start: 2019-01-15 | End: 2019-04-16 | Stop reason: SDUPTHER

## 2019-01-15 RX ORDER — FUROSEMIDE 20 MG/1
TABLET ORAL
Qty: 60 TABLET | Refills: 0 | Status: SHIPPED | OUTPATIENT
Start: 2019-01-15 | End: 2019-03-13 | Stop reason: SDUPTHER

## 2019-01-16 ENCOUNTER — TELEPHONE (OUTPATIENT)
Dept: OBGYN | Age: 49
End: 2019-01-16

## 2019-01-21 RX ORDER — CIPROFLOXACIN 500 MG/1
TABLET, FILM COATED ORAL
Qty: 10 TABLET | Refills: 0 | OUTPATIENT
Start: 2019-01-21

## 2019-01-22 DIAGNOSIS — E11.8 TYPE 2 DIABETES MELLITUS WITH COMPLICATION, WITH LONG-TERM CURRENT USE OF INSULIN (HCC): ICD-10-CM

## 2019-01-22 DIAGNOSIS — Z79.4 TYPE 2 DIABETES MELLITUS WITH COMPLICATION, WITH LONG-TERM CURRENT USE OF INSULIN (HCC): ICD-10-CM

## 2019-01-22 DIAGNOSIS — E78.5 DYSLIPIDEMIA: Chronic | ICD-10-CM

## 2019-01-22 RX ORDER — ATORVASTATIN CALCIUM 40 MG/1
TABLET, FILM COATED ORAL
Qty: 30 TABLET | Refills: 5 | Status: SHIPPED | OUTPATIENT
Start: 2019-01-22 | End: 2019-07-19 | Stop reason: SDUPTHER

## 2019-01-24 ENCOUNTER — HOSPITAL ENCOUNTER (OUTPATIENT)
Dept: MAMMOGRAPHY | Age: 49
Discharge: HOME | End: 2019-01-26
Payer: MEDICARE

## 2019-01-24 ENCOUNTER — HOSPITAL ENCOUNTER (OUTPATIENT)
Dept: ULTRASOUND IMAGING | Age: 49
Discharge: HOME | End: 2019-01-26
Payer: MEDICARE

## 2019-01-24 DIAGNOSIS — N63.20 LEFT BREAST MASS: ICD-10-CM

## 2019-01-24 DIAGNOSIS — N64.9 DISORDER OF BREAST: ICD-10-CM

## 2019-01-24 PROCEDURE — G0279 TOMOSYNTHESIS, MAMMO: HCPCS

## 2019-02-18 ENCOUNTER — OFFICE VISIT (OUTPATIENT)
Dept: INTERNAL MEDICINE | Age: 49
End: 2019-02-18
Payer: MEDICARE

## 2019-02-18 VITALS
DIASTOLIC BLOOD PRESSURE: 82 MMHG | WEIGHT: 271 LBS | BODY MASS INDEX: 45.1 KG/M2 | HEART RATE: 77 BPM | SYSTOLIC BLOOD PRESSURE: 124 MMHG

## 2019-02-18 DIAGNOSIS — F31.62 BIPOLAR DISORDER, CURRENT EPISODE MIXED, MODERATE (HCC): ICD-10-CM

## 2019-02-18 DIAGNOSIS — N39.41 URGE INCONTINENCE: ICD-10-CM

## 2019-02-18 DIAGNOSIS — I10 ESSENTIAL HYPERTENSION: Chronic | ICD-10-CM

## 2019-02-18 DIAGNOSIS — E03.9 HYPOTHYROIDISM, UNSPECIFIED TYPE: Chronic | ICD-10-CM

## 2019-02-18 DIAGNOSIS — Z79.4 TYPE 2 DIABETES MELLITUS WITH COMPLICATION, WITH LONG-TERM CURRENT USE OF INSULIN (HCC): ICD-10-CM

## 2019-02-18 DIAGNOSIS — E11.65 TYPE 2 DIABETES MELLITUS WITH HYPERGLYCEMIA, WITH LONG-TERM CURRENT USE OF INSULIN (HCC): Chronic | ICD-10-CM

## 2019-02-18 DIAGNOSIS — G47.33 OSA ON CPAP: Chronic | ICD-10-CM

## 2019-02-18 DIAGNOSIS — Z99.89 OSA ON CPAP: Chronic | ICD-10-CM

## 2019-02-18 DIAGNOSIS — J42 CHRONIC BRONCHITIS, UNSPECIFIED CHRONIC BRONCHITIS TYPE (HCC): Primary | Chronic | ICD-10-CM

## 2019-02-18 DIAGNOSIS — E78.5 DYSLIPIDEMIA: Chronic | ICD-10-CM

## 2019-02-18 DIAGNOSIS — E11.8 TYPE 2 DIABETES MELLITUS WITH COMPLICATION, WITH LONG-TERM CURRENT USE OF INSULIN (HCC): ICD-10-CM

## 2019-02-18 DIAGNOSIS — R71.8 MICROCYTOSIS: ICD-10-CM

## 2019-02-18 DIAGNOSIS — Z79.4 TYPE 2 DIABETES MELLITUS WITH HYPERGLYCEMIA, WITH LONG-TERM CURRENT USE OF INSULIN (HCC): Chronic | ICD-10-CM

## 2019-02-18 PROCEDURE — 3046F HEMOGLOBIN A1C LEVEL >9.0%: CPT | Performed by: INTERNAL MEDICINE

## 2019-02-18 PROCEDURE — 99214 OFFICE O/P EST MOD 30 MIN: CPT | Performed by: INTERNAL MEDICINE

## 2019-02-18 PROCEDURE — G8482 FLU IMMUNIZE ORDER/ADMIN: HCPCS | Performed by: INTERNAL MEDICINE

## 2019-02-18 PROCEDURE — G8417 CALC BMI ABV UP PARAM F/U: HCPCS | Performed by: INTERNAL MEDICINE

## 2019-02-18 PROCEDURE — 2022F DILAT RTA XM EVC RTNOPTHY: CPT | Performed by: INTERNAL MEDICINE

## 2019-02-18 PROCEDURE — 3023F SPIROM DOC REV: CPT | Performed by: INTERNAL MEDICINE

## 2019-02-18 PROCEDURE — 1036F TOBACCO NON-USER: CPT | Performed by: INTERNAL MEDICINE

## 2019-02-18 PROCEDURE — 99211 OFF/OP EST MAY X REQ PHY/QHP: CPT | Performed by: INTERNAL MEDICINE

## 2019-02-18 PROCEDURE — G8427 DOCREV CUR MEDS BY ELIG CLIN: HCPCS | Performed by: INTERNAL MEDICINE

## 2019-02-18 PROCEDURE — G8926 SPIRO NO PERF OR DOC: HCPCS | Performed by: INTERNAL MEDICINE

## 2019-02-18 RX ORDER — OXYBUTYNIN CHLORIDE 5 MG/1
5 TABLET ORAL 2 TIMES DAILY
Qty: 60 TABLET | Refills: 3 | Status: SHIPPED | OUTPATIENT
Start: 2019-02-18 | End: 2019-02-28 | Stop reason: SDUPTHER

## 2019-02-18 RX ORDER — LANOLIN ALCOHOL/MO/W.PET/CERES
CREAM (GRAM) TOPICAL
Qty: 60 TABLET | Refills: 3 | Status: SHIPPED | OUTPATIENT
Start: 2019-02-18 | End: 2022-08-02

## 2019-02-19 RX ORDER — LISINOPRIL 40 MG/1
TABLET ORAL
Qty: 30 TABLET | Refills: 5 | Status: SHIPPED | OUTPATIENT
Start: 2019-02-19

## 2019-02-19 RX ORDER — ATENOLOL 50 MG/1
TABLET ORAL
Qty: 30 TABLET | Refills: 5 | Status: SHIPPED | OUTPATIENT
Start: 2019-02-19

## 2019-02-19 RX ORDER — LEVOTHYROXINE SODIUM 0.15 MG/1
TABLET ORAL
Qty: 30 TABLET | Refills: 5 | Status: SHIPPED | OUTPATIENT
Start: 2019-02-19

## 2019-02-20 DIAGNOSIS — Z79.4 TYPE 2 DIABETES MELLITUS WITH COMPLICATION, WITH LONG-TERM CURRENT USE OF INSULIN (HCC): ICD-10-CM

## 2019-02-20 DIAGNOSIS — E11.8 TYPE 2 DIABETES MELLITUS WITH COMPLICATION, WITH LONG-TERM CURRENT USE OF INSULIN (HCC): ICD-10-CM

## 2019-02-20 RX ORDER — ASPIRIN 81 MG/1
TABLET ORAL
Qty: 30 TABLET | Refills: 5 | Status: SHIPPED | OUTPATIENT
Start: 2019-02-20

## 2019-02-28 ENCOUNTER — OFFICE VISIT (OUTPATIENT)
Dept: UROLOGY | Age: 49
End: 2019-02-28
Payer: MEDICARE

## 2019-02-28 VITALS
DIASTOLIC BLOOD PRESSURE: 88 MMHG | TEMPERATURE: 98.1 F | HEIGHT: 65 IN | SYSTOLIC BLOOD PRESSURE: 144 MMHG | BODY MASS INDEX: 44.98 KG/M2 | WEIGHT: 270 LBS

## 2019-02-28 DIAGNOSIS — N39.41 URGE INCONTINENCE: ICD-10-CM

## 2019-02-28 PROCEDURE — G8417 CALC BMI ABV UP PARAM F/U: HCPCS | Performed by: UROLOGY

## 2019-02-28 PROCEDURE — G8427 DOCREV CUR MEDS BY ELIG CLIN: HCPCS | Performed by: UROLOGY

## 2019-02-28 PROCEDURE — 99204 OFFICE O/P NEW MOD 45 MIN: CPT | Performed by: UROLOGY

## 2019-02-28 PROCEDURE — G8482 FLU IMMUNIZE ORDER/ADMIN: HCPCS | Performed by: UROLOGY

## 2019-02-28 PROCEDURE — 1036F TOBACCO NON-USER: CPT | Performed by: UROLOGY

## 2019-02-28 PROCEDURE — 81002 URINALYSIS NONAUTO W/O SCOPE: CPT | Performed by: UROLOGY

## 2019-02-28 RX ORDER — POLYETHYLENE GLYCOL 3350 17 G/17G
17 POWDER, FOR SOLUTION ORAL DAILY
Qty: 510 G | Refills: 5 | Status: SHIPPED | OUTPATIENT
Start: 2019-02-28 | End: 2019-03-30

## 2019-02-28 RX ORDER — OXYBUTYNIN CHLORIDE 5 MG/1
5 TABLET ORAL 2 TIMES DAILY
Qty: 60 TABLET | Refills: 3 | Status: SHIPPED | OUTPATIENT
Start: 2019-02-28 | End: 2019-05-29 | Stop reason: ALTCHOICE

## 2019-02-28 ASSESSMENT — ENCOUNTER SYMPTOMS
EYE REDNESS: 0
NAUSEA: 0
BACK PAIN: 1
SHORTNESS OF BREATH: 0
COUGH: 0
COLOR CHANGE: 0
VOMITING: 0
EYE PAIN: 0
ABDOMINAL PAIN: 1
WHEEZING: 0

## 2019-03-13 DIAGNOSIS — R60.0 BILATERAL EDEMA OF LOWER EXTREMITY: ICD-10-CM

## 2019-03-14 RX ORDER — FUROSEMIDE 20 MG/1
TABLET ORAL
Qty: 60 TABLET | Refills: 0 | Status: SHIPPED | OUTPATIENT
Start: 2019-03-14 | End: 2019-05-09 | Stop reason: SDUPTHER

## 2019-04-03 ENCOUNTER — TELEPHONE (OUTPATIENT)
Dept: INTERNAL MEDICINE | Age: 49
End: 2019-04-03

## 2019-04-03 DIAGNOSIS — G89.29 CHRONIC SACROILIAC JOINT PAIN: Primary | Chronic | ICD-10-CM

## 2019-04-03 DIAGNOSIS — M53.3 CHRONIC SACROILIAC JOINT PAIN: Primary | Chronic | ICD-10-CM

## 2019-04-03 NOTE — TELEPHONE ENCOUNTER
PC from opt stating she threw her back out a couple weeks ago trying to pick something up and she thinks PT would help her,     States shes having pain in back and feels like its inflamed.      If MD does order for PT pt wishes to go to:    [address] [id number]   [facility]: [phone]  [facility]: [phone]    Please review and advise

## 2019-04-04 NOTE — TELEPHONE ENCOUNTER
Patient came to window about PT referral. Writer printed copy for patient, also faxed over to [phone].

## 2019-04-16 DIAGNOSIS — M79.674 PAIN OF TOE OF RIGHT FOOT: ICD-10-CM

## 2019-04-16 DIAGNOSIS — R60.0 BILATERAL EDEMA OF LOWER EXTREMITY: ICD-10-CM

## 2019-04-17 RX ORDER — OMEPRAZOLE 40 MG/1
CAPSULE, DELAYED RELEASE ORAL
Qty: 30 CAPSULE | Refills: 2 | Status: SHIPPED | OUTPATIENT
Start: 2019-04-17 | End: 2019-07-21 | Stop reason: SDUPTHER

## 2019-04-17 RX ORDER — POTASSIUM CHLORIDE 750 MG/1
CAPSULE, EXTENDED RELEASE ORAL
Qty: 28 CAPSULE | Refills: 2 | Status: SHIPPED | OUTPATIENT
Start: 2019-04-17 | End: 2019-07-21 | Stop reason: SDUPTHER

## 2019-04-17 RX ORDER — GABAPENTIN 300 MG/1
CAPSULE ORAL
Qty: 90 CAPSULE | Refills: 2 | Status: SHIPPED | OUTPATIENT
Start: 2019-04-17 | End: 2019-07-21 | Stop reason: SDUPTHER

## 2019-04-17 NOTE — TELEPHONE ENCOUNTER
Keira request for gabapentin, omeprazol, potassium    Next Visit Date:  Future Appointments   Date Time Provider Ovidio Esparza   4/18/2019  9:10 AM [facility]   [facility]20[facility]2019  2:00 PM [facility]   5/29/2019 11:30 AM Surya Storey, APRN - CNP [address] Maintenance   Topic Date Due    Hepatitis B Vaccine (1 of 3 - Risk 3-dose series) 01/22/1990    Diabetic retinal exam  12/08/2016    Diabetic foot exam  11/03/2017    Diabetic microalbuminuria test  08/15/2019    Lipid screen  08/15/2019    TSH testing  08/20/2019    Potassium monitoring  11/04/2019    Creatinine monitoring  11/04/2019    A1C test (Diabetic or Prediabetic)  11/09/2019    Cervical cancer screen  09/17/2023    DTaP/Tdap/Td vaccine (2 - Td) 05/27/2024    Flu vaccine  Completed    Pneumococcal 0-64 years Vaccine  Completed    HIV screen  Completed             (applicable per patient's age: Cancer Screenings, Depression Screening, Fall Risk Screening, Immunizations)    Hemoglobin A1C (%)   Date Value   11/05/2018 8.8   02/08/2018 7.4   10/24/2017 7.4     Microalb/Crt.  Ratio (mcg/mg creat)   Date Value   08/15/2018 45 (H)     LDL Cholesterol (mg/dL)   Date Value   08/15/2018 70     AST (U/L)   Date Value   08/15/2018 19     ALT (U/L)   Date Value   08/15/2018 15     BUN (mg/dL)   Date Value   11/04/2018 16      (goal A1C is < 7)   (goal LDL is <100) need 30-50% reduction from baseline     BP Readings from Last 3 Encounters:   02/28/19 (!) 144/88   02/18/19 124/82   01/09/19 136/86    (goal /80)      All Future Testing planned in [facility]:  Lab Frequency Next Occurrence   XR Lumbosacral Spine 2 or 3 VW Once 09/19/2019   Iron And TIBC Once 05/18/2019   Ferritin Once 05/18/2019            Patient Active Problem List:     Hypertension     Diabetes mellitus-  insulin depedent type 2     Dyslipidemia     COPD (chronic obstructive pulmonary disease) (HCC)     DELGADO on CPAP     Arthritis     Bipolar disorder ([facility]     Hypothyroid     Hemorrhoids     Morbid obesity ([facility]     Lower extremity edema     Lumbosacral spondylosis without myelopathy     S/P laparoscopic hernia repair     Morbid obesity with BMI of 45.0-49.9, adult (HCC)     Chronic prescription benzodiazepine use     Chronic sacroiliac joint pain     Urge incontinence

## 2019-04-18 ENCOUNTER — HOSPITAL ENCOUNTER (OUTPATIENT)
Age: 49
Setting detail: SPECIMEN
Discharge: HOME | End: 2019-04-18
Payer: MEDICARE

## 2019-04-18 ENCOUNTER — OFFICE VISIT (OUTPATIENT)
Dept: INTERNAL MEDICINE | Age: 49
End: 2019-04-18
Payer: MEDICARE

## 2019-04-18 VITALS
BODY MASS INDEX: 44.39 KG/M2 | SYSTOLIC BLOOD PRESSURE: 123 MMHG | HEIGHT: 65 IN | HEART RATE: 76 BPM | WEIGHT: 266.4 LBS | DIASTOLIC BLOOD PRESSURE: 86 MMHG

## 2019-04-18 DIAGNOSIS — E11.8 TYPE 2 DIABETES MELLITUS WITH COMPLICATION, WITH LONG-TERM CURRENT USE OF INSULIN (HCC): Primary | ICD-10-CM

## 2019-04-18 DIAGNOSIS — J42 CHRONIC BRONCHITIS, UNSPECIFIED CHRONIC BRONCHITIS TYPE (HCC): Chronic | ICD-10-CM

## 2019-04-18 DIAGNOSIS — Z79.4 TYPE 2 DIABETES MELLITUS WITH COMPLICATION, WITH LONG-TERM CURRENT USE OF INSULIN (HCC): ICD-10-CM

## 2019-04-18 DIAGNOSIS — Z79.4 TYPE 2 DIABETES MELLITUS WITH COMPLICATION, WITH LONG-TERM CURRENT USE OF INSULIN (HCC): Primary | ICD-10-CM

## 2019-04-18 DIAGNOSIS — E11.8 TYPE 2 DIABETES MELLITUS WITH COMPLICATION, WITH LONG-TERM CURRENT USE OF INSULIN (HCC): ICD-10-CM

## 2019-04-18 LAB
ANION GAP SERPL CALCULATED.3IONS-SCNC: 18 MMOL/L (ref 9–17)
BUN BLDV-MCNC: 11 MG/DL (ref 6–20)
BUN/CREAT BLD: ABNORMAL (ref 9–20)
CALCIUM SERPL-MCNC: 9.8 MG/DL (ref 8.6–10.4)
CHLORIDE BLD-SCNC: 96 MMOL/L (ref 98–107)
CO2: 27 MMOL/L (ref 20–31)
CREAT SERPL-MCNC: 0.6 MG/DL (ref 0.5–0.9)
GFR AFRICAN AMERICAN: >60 ML/MIN
GFR NON-AFRICAN AMERICAN: >60 ML/MIN
GFR SERPL CREATININE-BSD FRML MDRD: ABNORMAL ML/MIN/{1.73_M2}
GFR SERPL CREATININE-BSD FRML MDRD: ABNORMAL ML/MIN/{1.73_M2}
GLUCOSE BLD-MCNC: 128 MG/DL (ref 70–99)
HBA1C MFR BLD: 11 %
POTASSIUM SERPL-SCNC: 4.6 MMOL/L (ref 3.7–5.3)
SODIUM BLD-SCNC: 141 MMOL/L (ref 135–144)

## 2019-04-18 PROCEDURE — G8427 DOCREV CUR MEDS BY ELIG CLIN: HCPCS | Performed by: INTERNAL MEDICINE

## 2019-04-18 PROCEDURE — 99213 OFFICE O/P EST LOW 20 MIN: CPT | Performed by: INTERNAL MEDICINE

## 2019-04-18 PROCEDURE — G8926 SPIRO NO PERF OR DOC: HCPCS | Performed by: INTERNAL MEDICINE

## 2019-04-18 PROCEDURE — 80048 BASIC METABOLIC PNL TOTAL CA: CPT

## 2019-04-18 PROCEDURE — 2022F DILAT RTA XM EVC RTNOPTHY: CPT | Performed by: INTERNAL MEDICINE

## 2019-04-18 PROCEDURE — 36415 COLL VENOUS BLD VENIPUNCTURE: CPT

## 2019-04-18 PROCEDURE — G8417 CALC BMI ABV UP PARAM F/U: HCPCS | Performed by: INTERNAL MEDICINE

## 2019-04-18 PROCEDURE — 1036F TOBACCO NON-USER: CPT | Performed by: INTERNAL MEDICINE

## 2019-04-18 PROCEDURE — 99211 OFF/OP EST MAY X REQ PHY/QHP: CPT | Performed by: INTERNAL MEDICINE

## 2019-04-18 PROCEDURE — 3046F HEMOGLOBIN A1C LEVEL >9.0%: CPT | Performed by: INTERNAL MEDICINE

## 2019-04-18 PROCEDURE — 3023F SPIROM DOC REV: CPT | Performed by: INTERNAL MEDICINE

## 2019-04-18 PROCEDURE — 83036 HEMOGLOBIN GLYCOSYLATED A1C: CPT | Performed by: INTERNAL MEDICINE

## 2019-04-18 RX ORDER — BUDESONIDE AND FORMOTEROL FUMARATE DIHYDRATE 160; 4.5 UG/1; UG/1
2 AEROSOL RESPIRATORY (INHALATION) 2 TIMES DAILY
Qty: 3 INHALER | Refills: 1 | Status: ON HOLD | OUTPATIENT
Start: 2019-04-18 | End: 2020-05-21

## 2019-04-18 NOTE — PROGRESS NOTES
[facility]    Progress Note    Date of patient's visit: 4/18/2019  YOB: 1970  Patient's Name:  Jeremy Rasmussen    Patient Care Team:  Yoko Spaulding MD as PCP - General (Internal Medicine)    REASON FOR VISIT: Routine outpatient follow     HISTORY OF PRESENT ILLNESS:    History was obtained from the patient. Jeremy Rasmussen is a 51 y.o. is here for a  Follow up after recent admission for kidney stones. Pt says pain is better today and she might have passed the stone. She denies any difficulty with passing urine. She says she smoked marijuana. Denies smoking cigarettes. She is following with endocrinologist Dr. Neeta Kee for diabetes and she is on Humulin R and metformin. We'll check a A1c today last A1c 8.8.        Patient Active Problem List   Diagnosis    Hypertension    Diabetes mellitus-  insulin depedent type 2    Dyslipidemia    COPD (chronic obstructive pulmonary disease) ([facility]    DELGADO on CPAP    Arthritis    Bipolar disorder ([facility])    Hypothyroid    Hemorrhoids    Morbid obesity ([address]    Lower extremity edema    Lumbosacral spondylosis without myelopathy    S/P laparoscopic hernia repair    Morbid obesity with BMI of 45.0-49.9, adult ([facility])    Chronic prescription benzodiazepine use    Chronic sacroiliac joint pain    Urge incontinence       ALLERGIES      Allergies   Allergen Reactions    Ioxaglate Anaphylaxis    Iv Dye [Iodides] Anaphylaxis    Loratadine      Legs jump     Claritin-D 12 Hour [Loratadine-Pseudoephedrine Er]     Iodine     Prochlorperazine Edisylate      Compazine         MEDICATIONS:      Current Outpatient Medications on File Prior to Visit   Medication Sig Dispense Refill    gabapentin (NEURONTIN) 300 MG capsule TAKE 1 CAPSULE THREE TIMES A DAY 90 capsule 2    omeprazole (PRILOSEC) 40 MG delayed release capsule TAKE 1 CAPSULE DAILY 30 capsule 2    potassium chloride (MICRO-K) 10 MEQ extended release strip USE AS DIRECTED DAILY AS NEEDED 100 each 5    Lancets MISC Use to check sugars daily. 100 each 3    LUMIGAN 0.01 % SOLN ophthalmic drops Place 1 drop into both eyes nightly       LATUDA 80 MG TABS tablet Take 80 mg by mouth daily       sertraline (ZOLOFT) 100 MG tablet Take 1 tablet by mouth nightly      traZODone (DESYREL) 100 MG tablet Take 1 tablet by mouth nightly      OLANZapine (ZYPREXA) 10 MG tablet TAKE 1 TABLET AT BEDTIME. 28 tablet 1    albuterol sulfate HFA (VENTOLIN HFA) 108 (90 Base) MCG/ACT inhaler Inhale 2 puffs into the lungs every 6 hours as needed for Wheezing 1 Inhaler 3     No current facility-administered medications on file prior to visit. SOCIAL HISTORY    Reviewed and no change from previous record. Angel Luisil Taiwo  reports that she has quit smoking. She quit after 25.00 years of use. She has never used smokeless tobacco.    FAMILY HISTORY:    Reviewed and No change from previous visit    REVIEW OF SYSTEMS:    ENT: negative  Respiratory: no cough, shortness of breath, or wheezing  Cardiovascular: no chest pain or dyspnea on exertion  Gastrointestinal: no abdominal pain, black or bloody stools  Neurological: no TIA or stroke symptoms  Endocrine: negative  Genito-Urinary: no dysuria, trouble voiding, or hematuria  Musculoskeletal: negative  Dermatological: negative    PHYSICAL EXAM:      Vitals:    04/18/19 0915   BP: 123/86   Pulse: 76     Physical Exam   Constitutional: She is oriented to person, place, and time. She appears well-nourished. HENT:   Head: Atraumatic. Eyes: Pupils are equal, round, and reactive to light. Neck: Normal range of motion. Cardiovascular: Normal rate. Pulmonary/Chest: Effort normal.   Abdominal: Soft. Musculoskeletal:        Right foot: There is normal range of motion and no deformity. Left foot: There is normal range of motion and no deformity. Feet:   Right Foot:   Protective Sensation: 10 sites tested. 10 sites sensed.    Skin Integrity: Negative for ulcer or erythema. Left Foot:   Protective Sensation: 10 sites tested. 10 sites sensed. Skin Integrity: Negative for ulcer or erythema. Neurological: She is alert and oriented to person, place, and time. Skin: Skin is warm. Psychiatric: She has a normal mood and affect. LABORATORY FINDINGS:    CBC:  Lab Results   Component Value Date    WBC 9.24 11/04/2018    WBC 10.7 08/20/2018    HGB 10.8 11/04/2018     11/04/2018     04/10/2012     BMP:    Lab Results   Component Value Date     11/04/2018    K 4.4 11/04/2018    CL 92 11/04/2018    CO2 27 11/04/2018    BUN 16 11/04/2018    CREATININE 0.83 11/04/2018    GLUCOSE 344 11/04/2018     HEMOGLOBIN A1C:   Lab Results   Component Value Date    LABA1C 8.8 11/05/2018     MICROALBUMIN URINE:   Lab Results   Component Value Date    MICROALBUR 32 08/15/2018     FASTING LIPID PANEL:  Lab Results   Component Value Date    CHOL 140 07/20/2017    CHOL 136 07/20/2017    CHOL 135 07/20/2017    HDL 54 08/15/2018    TRIG 173 (H) 07/20/2017    TRIG 169 (H) 07/20/2017    TRIG 170 (H) 07/20/2017     LIVER PROFILE:  Lab Results   Component Value Date    ALT 15 08/15/2018    AST 19 08/15/2018    PROT 8.2 08/15/2018    BILITOT 0.36 08/15/2018    BILIDIR <0.20 12/24/2014    LABALBU 4.2 08/15/2018      THYROID FUNCTION:   Lab Results   Component Value Date    TSH 1.45 08/20/2018      URINE ANALYSIS: No results found for: LABURIN  ASSESSMENT AND PLAN:    1. Type 2 diabetes mellitus with complication, with long-term current use of insulin ([facility])    -  DIABETES FOOT EXAM  - POCT glycosylated hemoglobin (Hb A1C)  - Basic Metabolic Panel; Future    2. Chronic bronchitis, unspecified chronic bronchitis type ([facility])    - budesonide-formoterol (SYMBICORT) 160-4.5 MCG/ACT AERO; Inhale 2 puffs into the lungs 2 times daily  Dispense: 3 Inhaler; Refill: 1      FOLLOW UP:   1 month     1.   Warren Noyola received counseling on the following healthy behaviors: nutrition, exercise and medication adherence  2. Patient given educational materials - see patient instructions  3. Discussed use, benefit, and side effects of prescribed medications. Barriers to medication compliance addressed. All patient questions answered. Pt voiced understanding. 4.  Reviewed prior labs and health maintenance  5. Continue current medications, diet and exercise.          Terrance Stauffer MD, P Internal Medicine Resident  [facility]  4/18/2019  9:56 AM

## 2019-04-18 NOTE — PROGRESS NOTES
Attending Physician Statement GC  I have discussed the care of Edgar Brewer, including pertinent history and exam findings with the resident. I have reviewed the key elements of all parts of the encounter with the resident. I have seen and examined the patient with the resident and the key elements of all parts of the encounter have been performed by me. Recent ER visit for renal stones  Symptoms resolved now    Patient admits to being non-compliant with her insulin  A1C 11 today  She is going to call her endocrinologist today and make appointment with them  Foot exam done today    COPD, chronic back pain-needs another prescription for handicap placard    Return for as scheduled in May 2019.       Alissa Wong MD

## 2019-04-18 NOTE — PROGRESS NOTES
Visit Information    Have you changed or started any medications since your last visit including any over-the-counter medicines, vitamins, or herbal medicines? no   Have you stopped taking any of your medications? Is so, why? -  no  Are you having any side effects from any of your medications? - no    Have you seen any other physician or provider since your last visit?  no   Have you had any other diagnostic tests since your last visit?  no   Have you been seen in the emergency room and/or had an admission in a hospital since we last saw you? Yes, terrazas kidney stone  Have you had your routine dental cleaning in the past 6 months?  no     Do you have an active MyChart account? If no, what is the barrier?   No:     Patient Care Team:  Sharon Sanchez MD as PCP - General (Internal Medicine)    Medical History Review  Past Medical, Family, and Social History reviewed and does contribute to the patient presenting condition    Health Maintenance   Topic Date Due    Hepatitis B Vaccine (1 of 3 - Risk 3-dose series) 01/22/1990    Diabetic retinal exam  12/08/2016    Diabetic foot exam  11/03/2017    Diabetic microalbuminuria test  08/15/2019    Lipid screen  08/15/2019    TSH testing  08/20/2019    Potassium monitoring  11/04/2019    Creatinine monitoring  11/04/2019    A1C test (Diabetic or Prediabetic)  11/09/2019    Cervical cancer screen  09/17/2023    DTaP/Tdap/Td vaccine (2 - Td) 05/27/2024    Flu vaccine  Completed    Pneumococcal 0-64 years Vaccine  Completed    HIV screen  Completed

## 2019-04-18 NOTE — PATIENT INSTRUCTIONS
Script for handicap parking placard given to patient      Your doctor has ordered blood or urine testing. You can get this testing done at the Lab located on the first floor of the [facility], or at any other [facility]. Please stop at Main Registration, before going to the lab, as you must be registered first.     Please get this lab done before your next appointment          Return appointment card and Summary of Care was reviewed and copy was given to the patient.   PHUONG

## 2019-05-09 ENCOUNTER — TELEPHONE (OUTPATIENT)
Dept: ADMINISTRATIVE | Age: 49
End: 2019-05-09

## 2019-05-09 DIAGNOSIS — M47.817 LUMBOSACRAL SPONDYLOSIS WITHOUT MYELOPATHY: Primary | ICD-10-CM

## 2019-05-09 DIAGNOSIS — R60.0 BILATERAL EDEMA OF LOWER EXTREMITY: ICD-10-CM

## 2019-05-09 DIAGNOSIS — M54.2 NECK PAIN: ICD-10-CM

## 2019-05-09 RX ORDER — FUROSEMIDE 20 MG/1
TABLET ORAL
Qty: 60 TABLET | Refills: 0 | Status: SHIPPED | OUTPATIENT
Start: 2019-05-09 | End: 2019-07-21 | Stop reason: SDUPTHER

## 2019-05-29 ENCOUNTER — OFFICE VISIT (OUTPATIENT)
Dept: UROLOGY | Age: 49
End: 2019-05-29
Payer: MEDICARE

## 2019-05-29 VITALS
HEIGHT: 65 IN | WEIGHT: 263 LBS | DIASTOLIC BLOOD PRESSURE: 81 MMHG | TEMPERATURE: 97.9 F | SYSTOLIC BLOOD PRESSURE: 120 MMHG | BODY MASS INDEX: 43.82 KG/M2 | HEART RATE: 68 BPM

## 2019-05-29 DIAGNOSIS — N39.41 URGE INCONTINENCE: Primary | ICD-10-CM

## 2019-05-29 DIAGNOSIS — R33.9 INCOMPLETE EMPTYING OF BLADDER: ICD-10-CM

## 2019-05-29 DIAGNOSIS — R39.15 URGENCY OF URINATION: ICD-10-CM

## 2019-05-29 DIAGNOSIS — R35.0 URINARY FREQUENCY: ICD-10-CM

## 2019-05-29 PROCEDURE — G8427 DOCREV CUR MEDS BY ELIG CLIN: HCPCS | Performed by: NURSE PRACTITIONER

## 2019-05-29 PROCEDURE — G8417 CALC BMI ABV UP PARAM F/U: HCPCS | Performed by: NURSE PRACTITIONER

## 2019-05-29 PROCEDURE — 99213 OFFICE O/P EST LOW 20 MIN: CPT | Performed by: NURSE PRACTITIONER

## 2019-05-29 PROCEDURE — 1036F TOBACCO NON-USER: CPT | Performed by: NURSE PRACTITIONER

## 2019-05-29 ASSESSMENT — ENCOUNTER SYMPTOMS
CONSTIPATION: 0
WHEEZING: 1
DIARRHEA: 0
ABDOMINAL PAIN: 0
VOMITING: 0
NAUSEA: 1
COUGH: 1
EYE PAIN: 0
BACK PAIN: 1
SHORTNESS OF BREATH: 1
EYE REDNESS: 0

## 2019-05-29 NOTE — PATIENT INSTRUCTIONS
timed and double voiding    Stop Oxybutynin    Start Myrbetriq sample    Watch coffee, tea, pop and alcohol- these are bladder irritants    F/u 1 month.     update GYN screening

## 2019-05-29 NOTE — PROGRESS NOTES
Review of Systems   Constitutional: Negative for appetite change, chills and fever. Eyes: Positive for visual disturbance. Negative for pain and redness. Respiratory: Positive for cough, shortness of breath and wheezing. Cardiovascular: Positive for chest pain and leg swelling. Gastrointestinal: Positive for nausea. Negative for abdominal pain, constipation, diarrhea and vomiting. Genitourinary: Positive for difficulty urinating, dysuria, hematuria and urgency. Negative for flank pain and frequency. Musculoskeletal: Positive for back pain, joint swelling and myalgias. Skin: Negative for rash and wound. Neurological: Positive for tremors (only on left side). Negative for dizziness and numbness. Hematological: Does not bruise/bleed easily.

## 2019-05-29 NOTE — PROGRESS NOTES
[facility]  [facility] [place]  [address]  [address]  [address] [place] [id number]  Dept: [address] [facility] Urology Office Note - Established    Patient:  Edgardo Faye  YOB: 1970  Date: 5/29/2019    The patient is a 51 y.o. female whopresents today for evaluation of the following problems:   Chief Complaint   Patient presents with    Follow-up     3 month follow up       HPI  Here for follow up on incontinence. She is using 3-5 briefs per day, has mixed incontinence- both urge and stress. She has Hx of tubal, no hysterectomy. Is on Lasix - not sure if she is taking 2x per day or 2 at a time. She has tried Oxybutynin with some improvement but not near goal for her. She does consume pop and alcohol. No recent pelvic exam- feels no vaginal bulge    Summary of old records: N/A    Additional History: N/A    Procedures Today: N/A    Urinalysis today:  No results found for this visit on 05/29/19.     Imaging Reviewed during this Office Visit:   (results were independently reviewed by physician and radiology report verified)    AUA Symptom Score (5/29/2019):  INCOMPLETE EMPTYING: How often have you had the sensation of not emptying your bladder?: About Half the time  FREQUENCY: How often do you have to urinate less than every two hours?: Almost always(ever 30 mins)  INTERMITTENCY: How often have you found you stopped and started again several times when you urinated?: About Half the time  URGENCY: How often have you found it difficult to postpone urination?: Almost always  WEAK STREAM: How often have you had a weak urinary stream?: Less than 1 to 5 times  STRAINING: How often have you had to strain to start  urination?: Less than Half the time  TOTAL I-PSS SCORE[de-identified] 24  How would you feel if you were to spend the rest of your life with your urinary condition?: Terrible    Last BUN and creatinine:  Lab Results   Component Value Date    BUN 11 04/18/2019     Lab Results Component Value Date    CREATININE 0.60 04/18/2019       Additional Lab/Culture results:    PAST MEDICAL, FAMILY AND SOCIAL HISTORY UPDATE:  Past Medical History:   Diagnosis Date    Arthritis     Back pain 10/29/2015    [facility]     COPD (chronic obstructive pulmonary disease) [facility]     Depression     Diabetes mellitus (HCC)     GERD (gastroesophageal reflux disease)     Glaucoma     Hx of blood clots     DVT  (20yrs ago)    Hyperlipidemia     Hypertension     Lumbosacral spondylosis without myelopathy     Morbid obesity ([facility] 10/12/2015    On home oxygen therapy     2 liters into the cpap machine    Pitting edema     PONV (postoperative nausea and vomiting)     Renal stones     history of renal stones    Sleep apnea     cpap    Thyroid disease     Urge incontinence 1/9/2019    Warts, genital      Past Surgical History:   Procedure Laterality Date    CHOLECYSTECTOMY      HERNIA REPAIR  05/02/2017    HERNIA REPAIR N/A 5/2/2017    HERNIA INCISIONAL REPAIR LAPAROSCOPIC ROBOTIC , lysis of adhensions performed by Andres Livingston MD at [address]  10/10/2018    camacho mbnb #1 marcaine xylocaine,    NERVE BLOCK  10/23/2018    camacho si #1 No steroid    TUBAL LIGATION       Family History   Problem Relation Age of Onset    Hypertension Mother     Diabetes Mother     COPD Mother     Lung Cancer Father     Brain Cancer Father      Outpatient Medications Marked as Taking for the 5/29/19 encounter (Office Visit) with ANDRES Stephens CNP   Medication Sig Dispense Refill    Mirabegron ER (MYRBETRIQ) 50 MG TB24 Take 50 mg by mouth daily 28 day sample given, pt will call if she palns to continue Med.  30 tablet 0    furosemide (LASIX) 20 MG tablet TAKE 1 TABLET DAILY 60 tablet 0    budesonide-formoterol (SYMBICORT) 160-4.5 MCG/ACT AERO Inhale 2 puffs into the lungs 2 times daily 3 Inhaler 1    Handicap Placard MISC by Does not apply route Cannot walk for more than 200 feet without stopping to rest  Duration 2 years 1 each 0    gabapentin (NEURONTIN) 300 MG capsule TAKE 1 CAPSULE THREE TIMES A DAY 90 capsule 2    omeprazole (PRILOSEC) 40 MG delayed release capsule TAKE 1 CAPSULE DAILY 30 capsule 2    potassium chloride (MICRO-K) 10 MEQ extended release capsule TAKE 1 CAPSULE DAILY 28 capsule 2    ASPIRIN ADULT LOW STRENGTH 81 MG EC tablet TAKE 1 TABLET DAILY 30 tablet 5    atenolol (TENORMIN) 50 MG tablet TAKE 1 TABLET DAILY 30 tablet 5    levothyroxine (SYNTHROID) 150 MCG tablet TAKE 1 TABLET DAILY 30 tablet 5    lisinopril (PRINIVIL;ZESTRIL) 40 MG tablet TAKE 1 TABLET DAILY 30 tablet 5    metFORMIN (GLUCOPHAGE) 1000 MG tablet TAKE 1 TABLET TWICE A DAY 60 tablet 5    ferrous sulfate 325 (65 Fe) MG EC tablet take 1 tablet by mouth twice a day 60 tablet 3    Incontinence Supply Disposable MISC 1 each by Does not apply route 3 times daily Diapers 90 each 1    atorvastatin (LIPITOR) 40 MG tablet TAKE 1 TABLET DAILY 30 tablet 5    albuterol sulfate HFA (VENTOLIN HFA) 108 (90 Base) MCG/ACT inhaler Inhale 2 puffs into the lungs every 6 hours as needed for Wheezing 1 Inhaler 3    ipratropium (ATROVENT HFA) 17 MCG/ACT inhaler Inhale 2 puffs into the lungs every 6 hours 1 Inhaler 3    nystatin (MYCOSTATIN) 052316 UNIT/GM powder Apply 3 times daily.  1 Bottle 0    meloxicam (MOBIC) 15 MG tablet TAKE 1 TABLET DAILY 30 tablet 0    fluticasone (FLONASE) 50 MCG/ACT nasal spray 1 spray by Each Nare route daily 1 Bottle 0    hydrOXYzine (VISTARIL) 25 MG capsule Take 25-75 mg by mouth      cyclobenzaprine (FLEXERIL) 5 MG tablet TAKE 1 TABLET TWICE A DAY AS NEEDED FOR MUSCLE SPASMS 60 tablet 0    nitroGLYCERIN (NITROSTAT) 0.4 MG SL tablet TAKE 1 TABLET UNDER THE TONGUE EVERY 5 MINUTES AS NEEDED FOR CHESTPAIN 25 tablet 0    DULoxetine (CYMBALTA) 30 MG extended release capsule Take 30 mg by mouth daily      COMFORT EZ PEN NEEDLES 32G X 6 MM MISC USE AS DIRECTED DAILY 100 each 0    TRUE METRIX BLOOD GLUCOSE TEST strip USE AS DIRECTED DAILY AS NEEDED 100 each 5    Lancets MISC Use to check sugars daily. 100 each 3    LUMIGAN 0.01 % SOLN ophthalmic drops Place 1 drop into both eyes nightly       LATUDA 80 MG TABS tablet Take 80 mg by mouth daily       sertraline (ZOLOFT) 100 MG tablet Take 1 tablet by mouth nightly      traZODone (DESYREL) 100 MG tablet Take 1 tablet by mouth nightly      OLANZapine (ZYPREXA) 10 MG tablet TAKE 1 TABLET AT BEDTIME. 28 tablet 1      (All medications reviewed and updated by provider sincelast office visit or hospitalization)   Ioxaglate; Iv dye [iodides]; Loratadine; Claritin-d 12 hour [loratadine-pseudoephedrine er]; Iodine; and Prochlorperazine edisylate  Social History     Tobacco Use   Smoking Status Former Smoker    Years: 25.00   Smokeless Tobacco Never Used   Tobacco Comment    quit in 2005      (If patient a smoker, smoking cessation counseling offered)     Social History     Substance and Sexual Activity   Alcohol Use Yes    Alcohol/week: 6.0 - 7.2 oz    Types: 10 - 12 Cans of beer per week       REVIEW OF SYSTEMS:  Review of Systems      Physical Exam:      Vitals:    05/29/19 1107   BP: 120/81   Pulse: 68   Temp: 97.9 °F (36.6 °C)     Body mass index is 43.77 kg/m². Patient is a 51 y.o. female in noacute distress and alert and oriented to person, place and time. Physical Exam  Constitutional: Patient in no acute distress. Neuro: Alert andoriented to person, place and time. Psych: Mood normal, affect normal  Skin: warm,pink, No rash noted  HEENT: Head: Normocephalic and atraumatic  Conjunctivae and EOM are normal. Pupils are equal, round  Nose: Normal  Right External Ear: Normal; Left External Ear: Normal  Mouth: Mucosa Moist  Neck: Supple  Lungs: Respiratory effort is normal  Cardiovascular: strong and reg,   Abdomen: Soft, non-tender, non-distended   Lymphatics: No palpable lymphadenopathy.   Bladder non-tender and not distended. Musculoskeletal: Normal gait and station      Assessment and Plan      1. Urge incontinence    2. Urinary frequency    3. Urgency of urination    4. Incomplete emptying of bladder           Plan:    timed and double voiding    Stop Oxybutynin    Start Myrbetriq sample    Watch coffee, tea, pop and alcohol- these are bladder irritants    F/u 1 month. Update GYN screenings     If no improvement UDS  Return in about 1 month (around 6/26/2019) for med recheck. Prescriptions Ordered:  Orders Placed This Encounter   Medications    DISCONTD: Mirabegron ER (MYRBETRIQ) 50 MG TB24     Sig: Take 50 mg by mouth daily     Dispense:  28 tablet     Refill:  0    Mirabegron ER (MYRBETRIQ) 50 MG TB24     Sig: Take 50 mg by mouth daily 28 day sample given, pt will call if she palns to continue Med. Dispense:  30 tablet     Refill:  0      Orders Placed:  No orders of the defined types were placed in this encounter. Karyle Homer, APRN - CNP    Review and Agree with the ROS entered by the MA.

## 2019-07-09 ENCOUNTER — OFFICE VISIT (OUTPATIENT)
Dept: UROLOGY | Age: 49
End: 2019-07-09
Payer: MEDICARE

## 2019-07-09 VITALS
HEIGHT: 65 IN | SYSTOLIC BLOOD PRESSURE: 104 MMHG | BODY MASS INDEX: 43.71 KG/M2 | DIASTOLIC BLOOD PRESSURE: 72 MMHG | WEIGHT: 262.35 LBS | TEMPERATURE: 98.7 F | HEART RATE: 89 BPM

## 2019-07-09 DIAGNOSIS — R35.1 NOCTURIA: ICD-10-CM

## 2019-07-09 DIAGNOSIS — N39.41 URGE INCONTINENCE: Primary | ICD-10-CM

## 2019-07-09 DIAGNOSIS — K59.00 CONSTIPATION, UNSPECIFIED CONSTIPATION TYPE: ICD-10-CM

## 2019-07-09 DIAGNOSIS — N20.0 KIDNEY STONE: ICD-10-CM

## 2019-07-09 DIAGNOSIS — R35.0 URINARY FREQUENCY: ICD-10-CM

## 2019-07-09 PROCEDURE — 1036F TOBACCO NON-USER: CPT | Performed by: NURSE PRACTITIONER

## 2019-07-09 PROCEDURE — G8417 CALC BMI ABV UP PARAM F/U: HCPCS | Performed by: NURSE PRACTITIONER

## 2019-07-09 PROCEDURE — G8428 CUR MEDS NOT DOCUMENT: HCPCS | Performed by: NURSE PRACTITIONER

## 2019-07-09 PROCEDURE — 99214 OFFICE O/P EST MOD 30 MIN: CPT | Performed by: NURSE PRACTITIONER

## 2019-07-09 RX ORDER — POLYETHYLENE GLYCOL 3350 17 G/17G
17 POWDER, FOR SOLUTION ORAL DAILY
Qty: 510 G | Refills: 3 | Status: SHIPPED | OUTPATIENT
Start: 2019-07-09 | End: 2019-08-08

## 2019-07-09 NOTE — PROGRESS NOTES
aorta is of normal caliber   throughout its length.  The patient is status post cholecystectomy with no biliary dilatation. No ureteral, or bladder calculi are seen, and the unopacified urinary bladder displays no other gross abnormalities. No abnormalities of the uterus or ovaries are suggested. Only a few small sigmoid diverticula are depicted without any evidence of associated inflammatory change.  A normal-appearing appendix appears to be identified    There is no free intraperitoneal fluid or gas and no gross abnormal fluid collections. IMPRESSION:  Small nonobstructive left renal calculi but no evidence of ureteral calculi or other acute abnormalities. All CT scans at this facility use dose modulation, iterative reconstruction, and/or weight based dosing when appropriate to reduce radiation dose to as low as reasonably achievable. Workstation:[id number]    Finalized by Aubree Mahajan MD on 6/29/2019 7:57 PM   Other Result Information   Interface - Rad Results/Orders In 1 - 06/29/2019  7:58 PM EDT  History:  Right flank pain. Right upper abdominal back pain. Left lower abdominal pain. Left flank and back pain. Hematuria    Exam/Technique:  Images are obtained through the abdomen and pelvis without either IV or oral contrast.      Comparison:  Noncontrast CT of the abdomen and pelvis from 4/6/2019    Findings: There is no evidence of active pulmonary disease in the lung bases. Multiple small nonobstructive calculi are displayed in the lower pole of the left kidney. These measure only 1 to 2 mm in diameter. 1 simple appearing cortical cyst in the left kidney measures only 12 mm    Lack of contrast of course significantly compromises their assessment, but as seen with this technique, the liver, spleen, pancreas, adrenal glands, and right kidney no significant abnormalities. There is no collecting system dilatation in either kidney. The abdominal aorta is of normal caliber left pleural fluid versus small    pericardial cyst       ABDOMEN:   Liver:  Within normal limits. Bile Ducts: Normal caliber. Gallbladder: Clips are visualized at the gallbladder fossa from prior    cholecystectomy   Pancreas:  Within normal limits. Spleen:  Within normal limits. Adrenals:  Within normal limits. Kidneys: 1 cm cyst off the posterior cortex of the left kidney. 3 mm    nonobstructing stones in the left collecting system.       Pelvis:   Reproductive Organs: No pelvic masses. Ureters: Within normal limits. Bladder: Within normal limits.       Bowel: Normal caliber. Mesenteric Lymph Nodes: No enlarged mesenteric lymph nodes. Peritoneum: No ascites or free air, no fluid collection. Vessels: Mild atherosclerotic calcification in the abdominal aorta. Otherwise, within normal limits   Retroperitoneum: Within normal limits. Abdominal Wall: Suggestion of prior mesh hernia repair in the    anterior abdominal wall and tiny umbilical hernia containing only    omental fat. Bones: Mild bony spurring in the lower thoracic spine and at L4    suggesting mild spondylosis.  Small calcified phlebolith in the left    pelvis inferiorly.       IMPRESSION:       Evidence of prior anterior abdominal wall mesh hernia repair. Evidence of prior cholecystectomy.       Vascular calcification in the abdominal aorta with no evidence of    aneurysmal dilatation or dissection.       Left renal cyst and small nonobstructing left renal stones.       Otherwise, unremarkable abdomen and pelvis CT without contrast.       Electronically signed by:Lise Lucio.               Transcribed by:  Mateusz, User    Resident:    Electronically Signed by: Negro Pappas @ 07/27/2018 06:41 PM        (results were independently reviewed by physician and radiology report verified)    AUA Symptom Score (7/9/2019):  INCOMPLETE EMPTYING: How often have you had the sensation of not emptying your bladder?: Less than 1 to 5 times  FREQUENCY: How often do you have to urinate less than every two hours?: About Half the time  INTERMITTENCY: How often have you found you stopped and started again several times when you urinated?: More than Half the time  URGENCY: How often have you found it difficult to postpone urination?: More than Half the time  WEAK STREAM: How often have you had a weak urinary stream?: Less than Half the time  STRAINING: How often have you had to strain to start  urination?: Not at all  TOTAL I-PSS SCORE[de-identified] 19  How would you feel if you were to spend the rest of your life with your urinary condition?: Unhappy    Last BUN and creatinine:  Lab Results   Component Value Date    BUN 11 04/18/2019     Lab Results   Component Value Date    CREATININE 0.60 04/18/2019       Additional Lab/Culture results:PAST MEDICAL, FAMILY AND SOCIAL HISTORY UPDATE:  Past Medical History:   Diagnosis Date    Arthritis     Back pain 10/29/2015    [facility]     COPD [facility]ronic obstructive pulmonary disease) [facility] [facility]     Depression     Diabetes mellitus [facility] [address] [facility]     GERD (gastroesophageal reflux disease)     Glaucoma     Hx of blood clots     DVT  (20yrs ago)    Hyperlipidemia     Hypertension     Lumbosacral spondylosis without myelopathy     Morbid obesity ([facility] [address] 10/12/2015    On home oxygen therapy     2 liters into the cpap machine    Pitting edema     PONV (postoperative nausea and vomiting)     Renal stones     history of renal stones    Sleep apnea     cpap    Thyroid disease     Urge incontinence 1/9/2019    Warts, genital      Past Surgical History:   Procedure Laterality Date    CHOLECYSTECTOMY      HERNIA REPAIR  05/02/2017    HERNIA REPAIR N/A 5/2/2017    HERNIA INCISIONAL REPAIR LAPAROSCOPIC ROBOTIC , lysis of adhensions performed by Jing Rosado MD at [facility]  10/10/2018    camacho mbnb #1 marcaine xylocaine,    NERVE BLOCK  10/23/2018    camacho si #1 No steroid    TUBAL

## 2019-07-10 ENCOUNTER — OFFICE VISIT (OUTPATIENT)
Dept: INTERNAL MEDICINE | Age: 49
End: 2019-07-10
Payer: MEDICARE

## 2019-07-10 VITALS
DIASTOLIC BLOOD PRESSURE: 80 MMHG | SYSTOLIC BLOOD PRESSURE: 119 MMHG | HEART RATE: 81 BPM | BODY MASS INDEX: 43.27 KG/M2 | WEIGHT: 260 LBS

## 2019-07-10 DIAGNOSIS — N39.41 URGE INCONTINENCE: ICD-10-CM

## 2019-07-10 DIAGNOSIS — J42 CHRONIC BRONCHITIS, UNSPECIFIED CHRONIC BRONCHITIS TYPE (HCC): Chronic | ICD-10-CM

## 2019-07-10 DIAGNOSIS — E03.9 HYPOTHYROIDISM, UNSPECIFIED TYPE: Chronic | ICD-10-CM

## 2019-07-10 DIAGNOSIS — E11.65 TYPE 2 DIABETES MELLITUS WITH HYPERGLYCEMIA, WITH LONG-TERM CURRENT USE OF INSULIN (HCC): Primary | Chronic | ICD-10-CM

## 2019-07-10 DIAGNOSIS — I10 ESSENTIAL HYPERTENSION: Chronic | ICD-10-CM

## 2019-07-10 DIAGNOSIS — Z79.4 TYPE 2 DIABETES MELLITUS WITH HYPERGLYCEMIA, WITH LONG-TERM CURRENT USE OF INSULIN (HCC): Primary | Chronic | ICD-10-CM

## 2019-07-10 DIAGNOSIS — E66.01 MORBID OBESITY (HCC): ICD-10-CM

## 2019-07-10 PROCEDURE — 3046F HEMOGLOBIN A1C LEVEL >9.0%: CPT | Performed by: STUDENT IN AN ORGANIZED HEALTH CARE EDUCATION/TRAINING PROGRAM

## 2019-07-10 PROCEDURE — G8926 SPIRO NO PERF OR DOC: HCPCS | Performed by: STUDENT IN AN ORGANIZED HEALTH CARE EDUCATION/TRAINING PROGRAM

## 2019-07-10 PROCEDURE — G8417 CALC BMI ABV UP PARAM F/U: HCPCS | Performed by: STUDENT IN AN ORGANIZED HEALTH CARE EDUCATION/TRAINING PROGRAM

## 2019-07-10 PROCEDURE — 3023F SPIROM DOC REV: CPT | Performed by: STUDENT IN AN ORGANIZED HEALTH CARE EDUCATION/TRAINING PROGRAM

## 2019-07-10 PROCEDURE — G8427 DOCREV CUR MEDS BY ELIG CLIN: HCPCS | Performed by: STUDENT IN AN ORGANIZED HEALTH CARE EDUCATION/TRAINING PROGRAM

## 2019-07-10 PROCEDURE — 1036F TOBACCO NON-USER: CPT | Performed by: STUDENT IN AN ORGANIZED HEALTH CARE EDUCATION/TRAINING PROGRAM

## 2019-07-10 PROCEDURE — 99211 OFF/OP EST MAY X REQ PHY/QHP: CPT | Performed by: INTERNAL MEDICINE

## 2019-07-10 PROCEDURE — 2022F DILAT RTA XM EVC RTNOPTHY: CPT | Performed by: STUDENT IN AN ORGANIZED HEALTH CARE EDUCATION/TRAINING PROGRAM

## 2019-07-10 PROCEDURE — 99214 OFFICE O/P EST MOD 30 MIN: CPT | Performed by: STUDENT IN AN ORGANIZED HEALTH CARE EDUCATION/TRAINING PROGRAM

## 2019-07-10 NOTE — PROGRESS NOTES
DIABETES and HYPERTENSION visit    BP Readings from Last 3 Encounters:   07/10/19 119/80   07/09/19 104/72   05/29/19 120/81        Hemoglobin A1C (%)   Date Value   04/18/2019 11.0   11/05/2018 8.8   02/08/2018 7.4     Microalb/Crt. Ratio (mcg/mg creat)   Date Value   08/15/2018 45 (H)     LDL Cholesterol (mg/dL)   Date Value   08/15/2018 70     HDL (mg/dL)   Date Value   08/15/2018 54     BUN (mg/dL)   Date Value   04/18/2019 11     CREATININE (mg/dL)   Date Value   04/18/2019 0.60     Glucose (mg/dL)   Date Value   04/18/2019 128 (H)   11/04/2018 344 (H)            Have you changed or started any medications since your last visit including any over-the-counter medicines, vitamins, or herbal medicines? no   Have you stopped taking any of your medications? Is so, why? -  no  Are you having any side effects from any of your medications? - no    Have you seen any other physician or provider since your last visit?  no   Have you had any other diagnostic tests since your last visit?  no   Have you been seen in the emergency room and/or had an admission in a hospital since we last saw you?  no   Have you had your routine dental cleaning in the past 6 months?  no     Have you had your annual diabetic retinal (eye) exam? No   (ensure copy of exam is in the chart)    Do you have an active MyChart account? If no, what is the barrier?   No:     Patient Care Team:  Angel Banks MD as PCP - General (Internal Medicine)  Dheeraj Durham MD as PCP - [facility]    Medical History Review  Past Medical, Family, and Social History reviewed and does not contribute to the patient presenting condition    Health Maintenance   Topic Date Due    Hepatitis B Vaccine (1 of 3 - Risk 3-dose series) 01/22/1990    Diabetic retinal exam  12/08/2016    A1C test (Diabetic or Prediabetic)  07/18/2019    Diabetic microalbuminuria test  08/15/2019    Lipid screen  08/15/2019    TSH testing  08/20/2019    Flu vaccine (1)
resident. I have seen and examined the patient and the key elements of all parts of the encounter have been performed by me. I agree with the assessment, plan and orders as documented by the resident. Agustín Dc M.D.   Faculty, Internal Medicine Residency Program  Raz  7/10/2019, 12:26 PM'

## 2019-07-15 ENCOUNTER — HOSPITAL ENCOUNTER (OUTPATIENT)
Age: 49
Setting detail: SPECIMEN
Discharge: HOME | End: 2019-07-15
Payer: MEDICARE

## 2019-07-15 ENCOUNTER — OFFICE VISIT (OUTPATIENT)
Dept: PRIMARY CARE CLINIC | Age: 49
End: 2019-07-15
Payer: MEDICARE

## 2019-07-15 VITALS
WEIGHT: 260 LBS | OXYGEN SATURATION: 96 % | BODY MASS INDEX: 43.32 KG/M2 | HEART RATE: 82 BPM | HEIGHT: 65 IN | SYSTOLIC BLOOD PRESSURE: 138 MMHG | DIASTOLIC BLOOD PRESSURE: 88 MMHG

## 2019-07-15 DIAGNOSIS — N76.0 ACUTE VAGINITIS: Primary | ICD-10-CM

## 2019-07-15 DIAGNOSIS — N76.0 ACUTE VAGINITIS: ICD-10-CM

## 2019-07-15 DIAGNOSIS — Z20.2 POSSIBLE EXPOSURE TO STD: ICD-10-CM

## 2019-07-15 LAB
BILIRUBIN, POC: NEGATIVE
BLOOD URINE, POC: NEGATIVE
CLARITY, POC: CLEAR
COLOR, POC: YELLOW
CONTROL: NORMAL
GLUCOSE URINE, POC: NEGATIVE
KETONES, POC: NEGATIVE
LEUKOCYTE EST, POC: NEGATIVE
NITRITE, POC: NEGATIVE
PH, POC: 6
PREGNANCY TEST URINE, POC: NEGATIVE
PROTEIN, POC: NEGATIVE
SPECIFIC GRAVITY, POC: 1.02
UROBILINOGEN, POC: 0.2

## 2019-07-15 PROCEDURE — 96372 THER/PROPH/DIAG INJ SC/IM: CPT | Performed by: NURSE PRACTITIONER

## 2019-07-15 PROCEDURE — 81025 URINE PREGNANCY TEST: CPT | Performed by: NURSE PRACTITIONER

## 2019-07-15 PROCEDURE — 1036F TOBACCO NON-USER: CPT | Performed by: NURSE PRACTITIONER

## 2019-07-15 PROCEDURE — 81003 URINALYSIS AUTO W/O SCOPE: CPT | Performed by: NURSE PRACTITIONER

## 2019-07-15 PROCEDURE — 99213 OFFICE O/P EST LOW 20 MIN: CPT | Performed by: NURSE PRACTITIONER

## 2019-07-15 PROCEDURE — G8427 DOCREV CUR MEDS BY ELIG CLIN: HCPCS | Performed by: NURSE PRACTITIONER

## 2019-07-15 PROCEDURE — G8417 CALC BMI ABV UP PARAM F/U: HCPCS | Performed by: NURSE PRACTITIONER

## 2019-07-15 RX ORDER — METRONIDAZOLE 500 MG/1
500 TABLET ORAL 2 TIMES DAILY
Qty: 14 TABLET | Refills: 0 | Status: SHIPPED | OUTPATIENT
Start: 2019-07-15 | End: 2019-07-22

## 2019-07-15 RX ORDER — AZITHROMYCIN 500 MG/1
1000 TABLET, FILM COATED ORAL ONCE
Qty: 2 TABLET | Refills: 0 | Status: SHIPPED | OUTPATIENT
Start: 2019-07-15 | End: 2019-07-15

## 2019-07-15 RX ORDER — CEFTRIAXONE 500 MG/1
250 INJECTION, POWDER, FOR SOLUTION INTRAMUSCULAR; INTRAVENOUS ONCE
Status: COMPLETED | OUTPATIENT
Start: 2019-07-15 | End: 2019-07-15

## 2019-07-15 RX ADMIN — CEFTRIAXONE 250 MG: 500 INJECTION, POWDER, FOR SOLUTION INTRAMUSCULAR; INTRAVENOUS at 18:52

## 2019-07-15 ASSESSMENT — ENCOUNTER SYMPTOMS
CONSTIPATION: 0
DIARRHEA: 0
NAUSEA: 0

## 2019-07-15 NOTE — PATIENT INSTRUCTIONS
itching and burning. And it can cause a change in vaginal discharge. Sometimes it can cause pain during sex. Vaginitis may be caused by bacteria, yeast, or other germs. Some infections that cause it are caught from a sexual partner. Bath products, spermicides, and douches can irritate the vagina too. Some women have this problem during and after menopause. A drop in estrogen levels during this time can cause dryness, soreness, and pain during sex. Your doctor can give you medicine to treat an infection. And home care may help you feel better. For certain types of infections, your sex partner must be treated too. Follow-up care is a key part of your treatment and safety. Be sure to make and go to all appointments, and call your doctor if you are having problems. It's also a good idea to know your test results and keep a list of the medicines you take. How can you care for yourself at home? · If your doctor prescribed antibiotics, take them as directed. Do not stop taking them just because you feel better. You need to take the full course of antibiotics. · Take your medicines exactly as prescribed. Call your doctor if you think you are having a problem with your medicine. · Do not eat or drink anything that has alcohol if you are taking metronidazole (Flagyl). · If you have a yeast infection, use over-the-counter products as your doctor tells you to. Or take medicine your doctor prescribes exactly as directed. · Wash your vaginal area daily with water. You also can use a mild, unscented soap if you want. · Do not use scented bath products. And do not use vaginal sprays or douches. · Put a washcloth soaked in cool water on the area to relieve itching. Or you can take cool baths. · If you have dryness because of menopause, use estrogen cream or pills that your doctor prescribes. · Ask your doctor about when it is okay to have sex. · Use a personal lubricant before sex if you have dryness.  Examples are Astroglide, K-Y Jelly, and Wet Lubricant Gel. · Ask your doctor if your sex partner also needs treatment. When should you call for help? Call your doctor now or seek immediate medical care if:    · You have a fever and pelvic pain.    Watch closely for changes in your health, and be sure to contact your doctor if:    · You have bleeding other than your period.     · You do not get better as expected. Where can you learn more? Go to [email or web address] and sign in to your [employer] account. Enter Q942 in the Zoomy box to learn more about \"Vaginitis: Care Instructions. \"     If you do not have an account, please click on the \"Sign Up Now\" link. Current as of: May 14, 2018  Content Version: 12.0  © 7247-9034 Healthwise, Incorporated. Care instructions adapted under license by [facility] ([facility]). If you have questions about a medical condition or this instruction, always ask your healthcare professional. [place] [address] any warranty or liability for your use of this information. Patient Education        Bacterial Vaginosis: Care Instructions  Your Care Instructions    Bacterial vaginosis is a type of vaginal infection. It is caused by excess growth of certain bacteria that are normally found in the vagina. Symptoms can include itching, swelling, pain when you urinate or have sex, and a gray or yellow discharge with a \"fishy\" odor. It is not considered an infection that is spread through sexual contact. Although symptoms can be annoying and uncomfortable, bacterial vaginosis does not usually cause other health problems. However, if you have it while you are pregnant, it can cause complications. While the infection may go away on its own, most doctors use antibiotics to treat it. You may have been prescribed pills or vaginal cream. With treatment, bacterial vaginosis usually clears up in 5 to 7 days.   Follow-up care is a key part of your treatment and

## 2019-07-15 NOTE — PROGRESS NOTES
Visit Information    Have you changed or started any medications since your last visit including any over-the-counter medicines, vitamins, or herbal medicines? no   Have you stopped taking any of your medications? Is so, why? -  no  Are you having any side effects from any of your medications? - no    Have you seen any other physician or provider since your last visit?  no   Have you had any other diagnostic tests since your last visit? yes - at [place]   Have you been seen in the emergency room and/or had an admission in a hospital since we last saw you?  yes - at [place]   Have you had your routine dental cleaning in the past 6 months?  no     Do you have an active MyChart account? If no, what is the barrier?   No: code     Patient Care Team:  Yumiko Gómez MD as PCP - General (Internal Medicine)    Medical History Review  Past Medical, Family, and Social History reviewed and does not contribute to the patient presenting condition    Health Maintenance   Topic Date Due    Hepatitis B Vaccine (1 of 3 - Risk 3-dose series) 1990    Diabetic retinal exam  2016    A1C test (Diabetic or Prediabetic)  2019    Diabetic microalbuminuria test  08/15/2019    Lipid screen  08/15/2019    TSH testing  2019    Flu vaccine (1) 2019    Diabetic foot exam  2020    Potassium monitoring  2020    Creatinine monitoring  2020    Cervical cancer screen  2023    DTaP/Tdap/Td vaccine (2 - Td) 2024    Pneumococcal 0-64 years Vaccine  Completed    HIV screen  Completed

## 2019-07-15 NOTE — PROGRESS NOTES
Base) MCG/ACT inhaler Inhale 2 puffs into the lungs every 6 hours as needed for Wheezing 1 Inhaler 3    ipratropium (ATROVENT HFA) 17 MCG/ACT inhaler Inhale 2 puffs into the lungs every 6 hours 1 Inhaler 3    nystatin (MYCOSTATIN) 096122 UNIT/GM powder Apply 3 times daily. 1 Bottle 0    meloxicam (MOBIC) 15 MG tablet TAKE 1 TABLET DAILY 30 tablet 0    fluticasone (FLONASE) 50 MCG/ACT nasal spray 1 spray by Each Nare route daily 1 Bottle 0    hydrOXYzine (VISTARIL) 25 MG capsule Take 25-75 mg by mouth      cyclobenzaprine (FLEXERIL) 5 MG tablet TAKE 1 TABLET TWICE A DAY AS NEEDED FOR MUSCLE SPASMS 60 tablet 0    nitroGLYCERIN (NITROSTAT) 0.4 MG SL tablet TAKE 1 TABLET UNDER THE TONGUE EVERY 5 MINUTES AS NEEDED FOR CHESTPAIN 25 tablet 0    DULoxetine (CYMBALTA) 30 MG extended release capsule Take 30 mg by mouth daily      COMFORT EZ PEN NEEDLES 32G X 6 MM MISC USE AS DIRECTED DAILY 100 each 0    TRUE METRIX BLOOD GLUCOSE TEST strip USE AS DIRECTED DAILY AS NEEDED 100 each 5    Lancets MISC Use to check sugars daily. 100 each 3    LUMIGAN 0.01 % SOLN ophthalmic drops Place 1 drop into both eyes nightly       LATUDA 80 MG TABS tablet Take 80 mg by mouth daily       sertraline (ZOLOFT) 100 MG tablet Take 1 tablet by mouth nightly      traZODone (DESYREL) 100 MG tablet Take 1 tablet by mouth nightly      OLANZapine (ZYPREXA) 10 MG tablet TAKE 1 TABLET AT BEDTIME. 28 tablet 1    gabapentin (NEURONTIN) 300 MG capsule TAKE 1 CAPSULE THREE TIMES A DAY 90 capsule 2     No current facility-administered medications for this visit. Allergies   Allergen Reactions    Ioxaglate Anaphylaxis    Iv Dye [Iodides] Anaphylaxis    Loratadine      Legs jump     Claritin-D 12 Hour [Loratadine-Pseudoephedrine Er]     Iodine     Prochlorperazine Edisylate      Compazine       Subjective:      Review of Systems   Gastrointestinal: Negative for constipation, diarrhea and nausea.    Genitourinary: Positive for vaginal discharge. Negative for flank pain, frequency, missed menses and pelvic pain. All other systems reviewed and are negative. 14 systems reviewed and negative except as listed in HPI. Objective:     Physical Exam   Constitutional: She is oriented to person, place, and time. She appears well-developed and well-nourished. No distress. HENT:   Head: Normocephalic and atraumatic. Eyes: Pupils are equal, round, and reactive to light. Conjunctivae are normal. Right eye exhibits no discharge. Left eye exhibits no discharge. Cardiovascular: Normal rate. Pulmonary/Chest: Effort normal.   Abdominal: Soft. Bowel sounds are normal.   Genitourinary: Uterus normal. Pelvic exam was performed with patient supine. No labial fusion. There is no rash, tenderness, lesion or injury on the right labia. There is no rash, tenderness, lesion or injury on the left labia. Uterus is not enlarged and not tender. Cervix exhibits no motion tenderness, no discharge and no friability. Right adnexum displays no mass, no tenderness and no fullness. Left adnexum displays no mass, no tenderness and no fullness. No erythema, tenderness or bleeding in the vagina. No foreign body in the vagina. No signs of injury around the vagina. Vaginal discharge found. Genitourinary Comments: Jeaneth Pack MA acted as chaperone  Patient tolerated well  Moderate amount of light yellowish-green discharge with fishy odor noted, discharge is thin and stringy   Musculoskeletal: She exhibits no tenderness or deformity. No cva tenderness   Neurological: She is alert and oriented to person, place, and time. She exhibits normal muscle tone. Coordination normal.   Skin: Skin is warm and dry. Capillary refill takes less than 2 seconds. No rash ( no rash to visible skin) noted. She is not diaphoretic. Small open pore to mons pubis, dried brown thick discharge expressed with gentle pressure, pt daniel well. Psychiatric: She has a normal mood and affect.  Her

## 2019-07-16 ENCOUNTER — TELEPHONE (OUTPATIENT)
Dept: PRIMARY CARE CLINIC | Age: 49
End: 2019-07-16

## 2019-07-16 LAB
-: NORMAL
DIRECT EXAM: NORMAL
Lab: NORMAL
REASON FOR REJECTION: NORMAL
SPECIMEN DESCRIPTION: NORMAL
ZZ NTE CLEAN UP: ORDERED TEST: NORMAL
ZZ NTE WITH NAME CLEAN UP: SPECIMEN SOURCE: NORMAL

## 2019-07-17 LAB
C TRACH DNA GENITAL QL NAA+PROBE: NEGATIVE
N. GONORRHOEAE DNA: NEGATIVE
SPECIMEN DESCRIPTION: NORMAL

## 2019-07-19 DIAGNOSIS — Z79.4 TYPE 2 DIABETES MELLITUS WITH COMPLICATION, WITH LONG-TERM CURRENT USE OF INSULIN (HCC): ICD-10-CM

## 2019-07-19 DIAGNOSIS — E78.5 DYSLIPIDEMIA: Chronic | ICD-10-CM

## 2019-07-19 DIAGNOSIS — N39.41 URGE INCONTINENCE: ICD-10-CM

## 2019-07-19 DIAGNOSIS — M79.674 PAIN OF TOE OF RIGHT FOOT: ICD-10-CM

## 2019-07-19 DIAGNOSIS — R60.0 BILATERAL EDEMA OF LOWER EXTREMITY: ICD-10-CM

## 2019-07-19 DIAGNOSIS — E11.8 TYPE 2 DIABETES MELLITUS WITH COMPLICATION, WITH LONG-TERM CURRENT USE OF INSULIN (HCC): ICD-10-CM

## 2019-07-21 RX ORDER — POTASSIUM CHLORIDE 750 MG/1
CAPSULE, EXTENDED RELEASE ORAL
Qty: 28 CAPSULE | Refills: 2 | Status: SHIPPED | OUTPATIENT
Start: 2019-07-21 | End: 2019-09-25 | Stop reason: SDUPTHER

## 2019-07-21 RX ORDER — OMEPRAZOLE 40 MG/1
CAPSULE, DELAYED RELEASE ORAL
Qty: 30 CAPSULE | Refills: 2 | Status: SHIPPED | OUTPATIENT
Start: 2019-07-21 | End: 2019-09-25 | Stop reason: SDUPTHER

## 2019-07-21 RX ORDER — TRAZODONE HYDROCHLORIDE 100 MG/1
100 TABLET ORAL NIGHTLY
Qty: 30 TABLET | Refills: 0 | Status: SHIPPED | OUTPATIENT
Start: 2019-07-21 | End: 2019-08-22 | Stop reason: SDUPTHER

## 2019-07-21 RX ORDER — GABAPENTIN 300 MG/1
CAPSULE ORAL
Qty: 90 CAPSULE | Refills: 0 | Status: SHIPPED | OUTPATIENT
Start: 2019-07-21 | End: 2019-08-22 | Stop reason: SDUPTHER

## 2019-07-21 RX ORDER — FUROSEMIDE 20 MG/1
TABLET ORAL
Qty: 60 TABLET | Refills: 0 | Status: SHIPPED | OUTPATIENT
Start: 2019-07-21 | End: 2019-08-30 | Stop reason: SDUPTHER

## 2019-07-21 RX ORDER — ATORVASTATIN CALCIUM 40 MG/1
TABLET, FILM COATED ORAL
Qty: 30 TABLET | Refills: 5 | Status: SHIPPED | OUTPATIENT
Start: 2019-07-21

## 2019-07-21 RX ORDER — OXYBUTYNIN CHLORIDE 5 MG/1
5 TABLET ORAL 2 TIMES DAILY
Qty: 60 TABLET | Refills: 3 | Status: SHIPPED | OUTPATIENT
Start: 2019-07-21 | End: 2019-10-31 | Stop reason: SDUPTHER

## 2019-07-23 ENCOUNTER — TELEPHONE (OUTPATIENT)
Dept: INTERNAL MEDICINE | Age: 49
End: 2019-07-23

## 2019-08-25 RX ORDER — TRAZODONE HYDROCHLORIDE 100 MG/1
100 TABLET ORAL NIGHTLY
Qty: 30 TABLET | Refills: 0 | Status: SHIPPED | OUTPATIENT
Start: 2019-08-25

## 2019-08-25 RX ORDER — GABAPENTIN 300 MG/1
CAPSULE ORAL
Qty: 90 CAPSULE | Refills: 0 | Status: SHIPPED | OUTPATIENT
Start: 2019-08-25 | End: 2022-08-02

## 2019-08-30 DIAGNOSIS — R60.0 BILATERAL EDEMA OF LOWER EXTREMITY: ICD-10-CM

## 2019-09-04 RX ORDER — FUROSEMIDE 20 MG/1
TABLET ORAL
Qty: 60 TABLET | Refills: 0 | Status: SHIPPED | OUTPATIENT
Start: 2019-09-04 | End: 2019-10-31 | Stop reason: SDUPTHER

## 2019-09-25 DIAGNOSIS — R60.0 BILATERAL EDEMA OF LOWER EXTREMITY: ICD-10-CM

## 2019-09-25 DIAGNOSIS — M79.674 PAIN OF TOE OF RIGHT FOOT: ICD-10-CM

## 2019-09-26 RX ORDER — OMEPRAZOLE 40 MG/1
CAPSULE, DELAYED RELEASE ORAL
Qty: 30 CAPSULE | Refills: 3 | Status: SHIPPED | OUTPATIENT
Start: 2019-09-26 | End: 2020-01-17

## 2019-09-26 RX ORDER — POTASSIUM CHLORIDE 750 MG/1
CAPSULE, EXTENDED RELEASE ORAL
Qty: 28 CAPSULE | Refills: 2 | Status: SHIPPED | OUTPATIENT
Start: 2019-09-26

## 2019-10-04 ENCOUNTER — TELEPHONE (OUTPATIENT)
Dept: PRIMARY CARE CLINIC | Age: 49
End: 2019-10-04

## 2019-10-04 ENCOUNTER — HOSPITAL ENCOUNTER (OUTPATIENT)
Age: 49
Setting detail: SPECIMEN
Discharge: HOME | End: 2019-10-04
Payer: MEDICARE

## 2019-10-04 ENCOUNTER — OFFICE VISIT (OUTPATIENT)
Dept: PRIMARY CARE CLINIC | Age: 49
End: 2019-10-04
Payer: MEDICARE

## 2019-10-04 VITALS
OXYGEN SATURATION: 93 % | RESPIRATION RATE: 20 BRPM | DIASTOLIC BLOOD PRESSURE: 76 MMHG | WEIGHT: 247.6 LBS | SYSTOLIC BLOOD PRESSURE: 124 MMHG | HEART RATE: 96 BPM | HEIGHT: 65 IN | BODY MASS INDEX: 41.25 KG/M2

## 2019-10-04 DIAGNOSIS — K92.1 BLOOD IN STOOL: ICD-10-CM

## 2019-10-04 DIAGNOSIS — N89.8 VAGINAL DISCHARGE: ICD-10-CM

## 2019-10-04 DIAGNOSIS — N89.8 VAGINAL ITCHING: ICD-10-CM

## 2019-10-04 DIAGNOSIS — N89.8 VAGINAL DISCHARGE: Primary | ICD-10-CM

## 2019-10-04 DIAGNOSIS — K64.4 EXTERNAL HEMORRHOID: ICD-10-CM

## 2019-10-04 PROBLEM — F31.10 BIPOLAR DISORD, CRNT EPISODE MANIC W/O PSYCH FEATURES, UNSP (HCC): Status: ACTIVE | Noted: 2019-07-30

## 2019-10-04 PROBLEM — F12.20: Status: ACTIVE | Noted: 2019-07-30

## 2019-10-04 LAB
DIRECT EXAM: NORMAL
Lab: NORMAL
SPECIMEN DESCRIPTION: NORMAL

## 2019-10-04 PROCEDURE — G8427 DOCREV CUR MEDS BY ELIG CLIN: HCPCS | Performed by: NURSE PRACTITIONER

## 2019-10-04 PROCEDURE — 99214 OFFICE O/P EST MOD 30 MIN: CPT | Performed by: NURSE PRACTITIONER

## 2019-10-04 PROCEDURE — G8484 FLU IMMUNIZE NO ADMIN: HCPCS | Performed by: NURSE PRACTITIONER

## 2019-10-04 PROCEDURE — 1036F TOBACCO NON-USER: CPT | Performed by: NURSE PRACTITIONER

## 2019-10-04 PROCEDURE — G8417 CALC BMI ABV UP PARAM F/U: HCPCS | Performed by: NURSE PRACTITIONER

## 2019-10-04 RX ORDER — FLUCONAZOLE 150 MG/1
150 TABLET ORAL ONCE
Qty: 1 TABLET | Refills: 0 | Status: SHIPPED | OUTPATIENT
Start: 2019-10-04 | End: 2019-10-04

## 2019-10-04 RX ORDER — DIAPER,BRIEF,INFANT-TODD,DISP
EACH MISCELLANEOUS
Qty: 1 TUBE | Refills: 1 | Status: SHIPPED | OUTPATIENT
Start: 2019-10-04 | End: 2019-10-11

## 2019-10-04 RX ORDER — METRONIDAZOLE 500 MG/1
500 TABLET ORAL 2 TIMES DAILY
Qty: 14 TABLET | Refills: 0 | Status: SHIPPED | OUTPATIENT
Start: 2019-10-04 | End: 2019-10-11

## 2019-10-04 ASSESSMENT — ENCOUNTER SYMPTOMS
SHORTNESS OF BREATH: 0
VOMITING: 0
BLOOD IN STOOL: 1
EYE ITCHING: 0
COLOR CHANGE: 0
WHEEZING: 0
RHINORRHEA: 0
EYE DISCHARGE: 0
ABDOMINAL DISTENTION: 0
CONSTIPATION: 0
SORE THROAT: 0
ABDOMINAL PAIN: 0
NAUSEA: 0

## 2019-10-06 ENCOUNTER — TELEPHONE (OUTPATIENT)
Dept: PRIMARY CARE CLINIC | Age: 49
End: 2019-10-06

## 2019-10-06 LAB
C. TRACHOMATIS DNA ,URINE: NEGATIVE
N. GONORRHOEAE DNA, URINE: NEGATIVE
SPECIMEN DESCRIPTION: NORMAL

## 2019-10-14 ENCOUNTER — TELEPHONE (OUTPATIENT)
Dept: INTERNAL MEDICINE | Age: 49
End: 2019-10-14

## 2019-10-31 DIAGNOSIS — R60.0 BILATERAL EDEMA OF LOWER EXTREMITY: ICD-10-CM

## 2019-10-31 DIAGNOSIS — N39.41 URGE INCONTINENCE: ICD-10-CM

## 2019-11-06 RX ORDER — OXYBUTYNIN CHLORIDE 5 MG/1
5 TABLET ORAL 2 TIMES DAILY
Qty: 60 TABLET | Refills: 2 | Status: SHIPPED | OUTPATIENT
Start: 2019-11-06 | End: 2020-01-17

## 2019-11-06 RX ORDER — FUROSEMIDE 20 MG/1
TABLET ORAL
Qty: 60 TABLET | Refills: 2 | Status: SHIPPED | OUTPATIENT
Start: 2019-11-06 | End: 2020-04-07

## 2019-11-22 ENCOUNTER — HOSPITAL ENCOUNTER (OUTPATIENT)
Age: 49
Setting detail: SPECIMEN
Discharge: HOME | End: 2019-11-22
Payer: MEDICARE

## 2019-11-22 ENCOUNTER — OFFICE VISIT (OUTPATIENT)
Dept: PRIMARY CARE CLINIC | Age: 49
End: 2019-11-22
Payer: MEDICARE

## 2019-11-22 VITALS
OXYGEN SATURATION: 98 % | BODY MASS INDEX: 41.93 KG/M2 | WEIGHT: 252 LBS | DIASTOLIC BLOOD PRESSURE: 80 MMHG | HEART RATE: 78 BPM | SYSTOLIC BLOOD PRESSURE: 120 MMHG

## 2019-11-22 DIAGNOSIS — J02.9 VIRAL PHARYNGITIS: Primary | ICD-10-CM

## 2019-11-22 DIAGNOSIS — J02.9 SORE THROAT: ICD-10-CM

## 2019-11-22 LAB — S PYO AG THROAT QL: NORMAL

## 2019-11-22 PROCEDURE — G8427 DOCREV CUR MEDS BY ELIG CLIN: HCPCS | Performed by: NURSE PRACTITIONER

## 2019-11-22 PROCEDURE — G8417 CALC BMI ABV UP PARAM F/U: HCPCS | Performed by: NURSE PRACTITIONER

## 2019-11-22 PROCEDURE — 1036F TOBACCO NON-USER: CPT | Performed by: NURSE PRACTITIONER

## 2019-11-22 PROCEDURE — 87880 STREP A ASSAY W/OPTIC: CPT | Performed by: NURSE PRACTITIONER

## 2019-11-22 PROCEDURE — 99213 OFFICE O/P EST LOW 20 MIN: CPT | Performed by: NURSE PRACTITIONER

## 2019-11-22 PROCEDURE — G8484 FLU IMMUNIZE NO ADMIN: HCPCS | Performed by: NURSE PRACTITIONER

## 2019-11-22 RX ORDER — IBUPROFEN 600 MG/1
600 TABLET ORAL EVERY 8 HOURS PRN
Qty: 30 TABLET | Refills: 0 | Status: SHIPPED | OUTPATIENT
Start: 2019-11-22 | End: 2022-08-02

## 2019-11-22 ASSESSMENT — ENCOUNTER SYMPTOMS
SORE THROAT: 1
COUGH: 0
SINUS PAIN: 0
NAUSEA: 0
ABDOMINAL PAIN: 0
VOMITING: 0
SHORTNESS OF BREATH: 0

## 2019-11-25 LAB
CULTURE: NORMAL
Lab: NORMAL
SPECIMEN DESCRIPTION: NORMAL

## 2020-01-16 NOTE — TELEPHONE ENCOUNTER
These were refused last due to patient no longer being under your care . . do you want me to call pharmacy to discontinue meds so we no longer get automatic refill requests?

## 2020-01-17 RX ORDER — OXYBUTYNIN CHLORIDE 5 MG/1
TABLET ORAL
Qty: 60 TABLET | Refills: 2 | Status: SHIPPED | OUTPATIENT
Start: 2020-01-17 | End: 2020-04-07

## 2020-01-17 RX ORDER — OMEPRAZOLE 40 MG/1
CAPSULE, DELAYED RELEASE ORAL
Qty: 30 CAPSULE | Refills: 3 | Status: SHIPPED | OUTPATIENT
Start: 2020-01-17 | End: 2020-02-27

## 2020-02-25 ENCOUNTER — TELEPHONE (OUTPATIENT)
Dept: UROLOGY | Age: 50
End: 2020-02-25

## 2020-02-25 NOTE — TELEPHONE ENCOUNTER
LMOM for patient to contact office. Patient wanted to know why Myrbetriq refill was not in her bubble packs at [facility]. Patient was supposed to follow up in Jan of 2020 for a medication check and refill with Lillian Mobley and cancelled appt.

## 2020-02-27 NOTE — TELEPHONE ENCOUNTER
Request for Prilosec. PC to pt-- unable to [facility] phone just rings--added message to refill for pharmacy to let pt know to contact us for future refills    Next Visit Date:  Future Appointments   Date Time Provider Ovidio Esparza   3/11/2020  8:00 AM Genevieve Bansal APRN - CNP [facility]   3/13/2020  8:30 AM ST[facility] RM 1 [facility]       Health Maintenance   Topic Date Due    Hepatitis B vaccine (1 of 3 - Risk 3-dose series) 01/22/1990    Diabetic retinal exam  12/08/2016    Annual Wellness Visit (AWV)  04/03/2019    A1C test (Diabetic or Prediabetic)  07/18/2019    Diabetic microalbuminuria test  08/15/2019    Lipid screen  08/15/2019    TSH testing  08/20/2019    Diabetic foot exam  04/18/2020    Potassium monitoring  07/25/2020    Creatinine monitoring  07/25/2020    Shingles Vaccine (1 of 2) 01/22/2021    Cervical cancer screen  09/17/2023    DTaP/Tdap/Td vaccine (2 - Td) 05/27/2024    Flu vaccine  Completed    Pneumococcal 0-64 years Vaccine  Completed    HIV screen  Completed    Hepatitis A vaccine  Aged Out    Hib vaccine  Aged Out    Meningococcal (ACWY) vaccine  Aged Out       Hemoglobin A1C (%)   Date Value   04/18/2019 11.0   11/05/2018 8.8   02/08/2018 7.4             ( goal A1C is < 7)   Microalb/Crt.  Ratio (mcg/mg creat)   Date Value   08/15/2018 45 (H)     LDL Cholesterol (mg/dL)   Date Value   08/15/2018 70       (goal LDL is <100)   AST (U/L)   Date Value   08/15/2018 19     ALT (U/L)   Date Value   08/15/2018 15     BUN (mg/dL)   Date Value   07/25/2019 6 (L)     BP Readings from Last 3 Encounters:   11/22/19 120/80   10/04/19 124/76   07/15/19 138/88          (goal 120/80)    All Future Testing planned in [facility]  Lab Frequency Next Occurrence   Basic Metabolic Panel Once 04/44/1925   TSH With Reflex Ft4 Once 05/07/2020   Lipid Panel Once 05/07/2020   POCT Fecal Immunochemical Test (FIT) Once 01/27/2020         Patient Active Problem List:

## 2020-02-28 RX ORDER — OMEPRAZOLE 40 MG/1
CAPSULE, DELAYED RELEASE ORAL
Qty: 30 CAPSULE | Refills: 3 | Status: SHIPPED | OUTPATIENT
Start: 2020-02-28 | End: 2022-08-02

## 2020-04-08 RX ORDER — FUROSEMIDE 20 MG/1
TABLET ORAL
Qty: 60 TABLET | Refills: 2 | Status: SHIPPED | OUTPATIENT
Start: 2020-04-08

## 2020-04-08 RX ORDER — OXYBUTYNIN CHLORIDE 5 MG/1
TABLET ORAL
Qty: 60 TABLET | Refills: 2 | Status: SHIPPED
Start: 2020-04-08 | End: 2020-09-23 | Stop reason: ALTCHOICE

## 2020-05-21 ENCOUNTER — ANESTHESIA (OUTPATIENT)
Dept: ENDOSCOPY | Age: 50
End: 2020-05-21
Payer: MEDICARE

## 2020-05-21 ENCOUNTER — HOSPITAL ENCOUNTER (OUTPATIENT)
Age: 50
Setting detail: OUTPATIENT SURGERY
Discharge: HOME | End: 2020-05-21
Attending: SURGERY | Admitting: SURGERY
Payer: MEDICARE

## 2020-05-21 ENCOUNTER — ANESTHESIA EVENT (OUTPATIENT)
Dept: ENDOSCOPY | Age: 50
End: 2020-05-21
Payer: MEDICARE

## 2020-05-21 VITALS
DIASTOLIC BLOOD PRESSURE: 74 MMHG | SYSTOLIC BLOOD PRESSURE: 130 MMHG | OXYGEN SATURATION: 98 % | TEMPERATURE: 96.8 F | RESPIRATION RATE: 9 BRPM

## 2020-05-21 VITALS
RESPIRATION RATE: 18 BRPM | TEMPERATURE: 97.3 F | BODY MASS INDEX: 41.99 KG/M2 | SYSTOLIC BLOOD PRESSURE: 140 MMHG | HEART RATE: 80 BPM | HEIGHT: 65 IN | WEIGHT: 252 LBS | DIASTOLIC BLOOD PRESSURE: 80 MMHG | OXYGEN SATURATION: 93 %

## 2020-05-21 LAB
-: NORMAL
GLUCOSE BLD-MCNC: 227 MG/DL (ref 65–105)
GLUCOSE BLD-MCNC: 269 MG/DL (ref 65–105)
GLUCOSE BLD-MCNC: 292 MG/DL (ref 65–105)
HCG, PREGNANCY URINE (POC): NEGATIVE

## 2020-05-21 PROCEDURE — 2580000003 HC RX 258: Performed by: ANESTHESIOLOGY

## 2020-05-21 PROCEDURE — 81025 URINE PREGNANCY TEST: CPT

## 2020-05-21 PROCEDURE — 7100000030 HC ASPR PHASE II RECOVERY - FIRST 15 MIN: Performed by: SURGERY

## 2020-05-21 PROCEDURE — 82947 ASSAY GLUCOSE BLOOD QUANT: CPT

## 2020-05-21 PROCEDURE — 2500000003 HC RX 250 WO HCPCS: Performed by: NURSE ANESTHETIST, CERTIFIED REGISTERED

## 2020-05-21 PROCEDURE — 2500000003 HC RX 250 WO HCPCS: Performed by: ANESTHESIOLOGY

## 2020-05-21 PROCEDURE — 3609012400 HC EGD TRANSORAL BIOPSY SINGLE/MULTIPLE: Performed by: SURGERY

## 2020-05-21 PROCEDURE — 3609010600 HC COLONOSCOPY POLYPECTOMY SNARE/COLD BIOPSY: Performed by: SURGERY

## 2020-05-21 PROCEDURE — 7100000000 HC PACU RECOVERY - FIRST 15 MIN: Performed by: SURGERY

## 2020-05-21 PROCEDURE — 7100000031 HC ASPR PHASE II RECOVERY - ADDTL 15 MIN: Performed by: SURGERY

## 2020-05-21 PROCEDURE — 6370000000 HC RX 637 (ALT 250 FOR IP): Performed by: SURGERY

## 2020-05-21 PROCEDURE — 6360000002 HC RX W HCPCS: Performed by: ANESTHESIOLOGY

## 2020-05-21 PROCEDURE — 6370000000 HC RX 637 (ALT 250 FOR IP): Performed by: ANESTHESIOLOGY

## 2020-05-21 PROCEDURE — 3700000001 HC ADD 15 MINUTES (ANESTHESIA): Performed by: SURGERY

## 2020-05-21 PROCEDURE — 6360000002 HC RX W HCPCS: Performed by: NURSE ANESTHETIST, CERTIFIED REGISTERED

## 2020-05-21 PROCEDURE — 2709999900 HC NON-CHARGEABLE SUPPLY: Performed by: SURGERY

## 2020-05-21 PROCEDURE — 7100000001 HC PACU RECOVERY - ADDTL 15 MIN: Performed by: SURGERY

## 2020-05-21 PROCEDURE — 3700000000 HC ANESTHESIA ATTENDED CARE: Performed by: SURGERY

## 2020-05-21 PROCEDURE — 7100000010 HC PHASE II RECOVERY - FIRST 15 MIN: Performed by: SURGERY

## 2020-05-21 PROCEDURE — 7100000011 HC PHASE II RECOVERY - ADDTL 15 MIN: Performed by: SURGERY

## 2020-05-21 PROCEDURE — 88305 TISSUE EXAM BY PATHOLOGIST: CPT

## 2020-05-21 RX ORDER — DEXAMETHASONE SODIUM PHOSPHATE 4 MG/ML
INJECTION, SOLUTION INTRA-ARTICULAR; INTRALESIONAL; INTRAMUSCULAR; INTRAVENOUS; SOFT TISSUE PRN
Status: DISCONTINUED | OUTPATIENT
Start: 2020-05-21 | End: 2020-05-21 | Stop reason: SDUPTHER

## 2020-05-21 RX ORDER — OXYCODONE HYDROCHLORIDE AND ACETAMINOPHEN 5; 325 MG/1; MG/1
2 TABLET ORAL PRN
Status: DISCONTINUED | OUTPATIENT
Start: 2020-05-21 | End: 2020-05-21 | Stop reason: HOSPADM

## 2020-05-21 RX ORDER — LIDOCAINE HYDROCHLORIDE 10 MG/ML
INJECTION, SOLUTION EPIDURAL; INFILTRATION; INTRACAUDAL; PERINEURAL PRN
Status: DISCONTINUED | OUTPATIENT
Start: 2020-05-21 | End: 2020-05-21 | Stop reason: SDUPTHER

## 2020-05-21 RX ORDER — ESOMEPRAZOLE MAGNESIUM 40 MG/1
40 CAPSULE, DELAYED RELEASE ORAL 2 TIMES DAILY
Qty: 120 CAPSULE | Refills: 0 | Status: SHIPPED | OUTPATIENT
Start: 2020-05-21 | End: 2022-08-02

## 2020-05-21 RX ORDER — FENTANYL CITRATE 50 UG/ML
INJECTION, SOLUTION INTRAMUSCULAR; INTRAVENOUS PRN
Status: DISCONTINUED | OUTPATIENT
Start: 2020-05-21 | End: 2020-05-21 | Stop reason: SDUPTHER

## 2020-05-21 RX ORDER — TIOTROPIUM BROMIDE 18 UG/1
18 CAPSULE ORAL; RESPIRATORY (INHALATION) DAILY
COMMUNITY
End: 2022-08-02

## 2020-05-21 RX ORDER — MIDAZOLAM HYDROCHLORIDE 1 MG/ML
INJECTION INTRAMUSCULAR; INTRAVENOUS PRN
Status: DISCONTINUED | OUTPATIENT
Start: 2020-05-21 | End: 2020-05-21 | Stop reason: SDUPTHER

## 2020-05-21 RX ORDER — SODIUM CHLORIDE 9 MG/ML
INJECTION, SOLUTION INTRAVENOUS CONTINUOUS
Status: DISCONTINUED | OUTPATIENT
Start: 2020-05-21 | End: 2020-05-21 | Stop reason: HOSPADM

## 2020-05-21 RX ORDER — LABETALOL 20 MG/4 ML (5 MG/ML) INTRAVENOUS SYRINGE
5 EVERY 10 MIN PRN
Status: DISCONTINUED | OUTPATIENT
Start: 2020-05-21 | End: 2020-05-21 | Stop reason: HOSPADM

## 2020-05-21 RX ORDER — DIPHENHYDRAMINE HYDROCHLORIDE 50 MG/ML
12.5 INJECTION INTRAMUSCULAR; INTRAVENOUS
Status: DISCONTINUED | OUTPATIENT
Start: 2020-05-21 | End: 2020-05-21 | Stop reason: HOSPADM

## 2020-05-21 RX ORDER — SUCCINYLCHOLINE/SOD CL,ISO/PF 200MG/10ML
SYRINGE (ML) INTRAVENOUS PRN
Status: DISCONTINUED | OUTPATIENT
Start: 2020-05-21 | End: 2020-05-21 | Stop reason: SDUPTHER

## 2020-05-21 RX ORDER — SUCRALFATE 1 G/1
1 TABLET ORAL 4 TIMES DAILY
Qty: 240 TABLET | Refills: 0 | Status: SHIPPED | OUTPATIENT
Start: 2020-05-21 | End: 2022-08-02

## 2020-05-21 RX ORDER — SCOLOPAMINE TRANSDERMAL SYSTEM 1 MG/1
1 PATCH, EXTENDED RELEASE TRANSDERMAL ONCE
Status: DISCONTINUED | OUTPATIENT
Start: 2020-05-21 | End: 2020-05-21 | Stop reason: HOSPADM

## 2020-05-21 RX ORDER — ONDANSETRON 2 MG/ML
INJECTION INTRAMUSCULAR; INTRAVENOUS PRN
Status: DISCONTINUED | OUTPATIENT
Start: 2020-05-21 | End: 2020-05-21 | Stop reason: SDUPTHER

## 2020-05-21 RX ORDER — FENTANYL CITRATE 50 UG/ML
INJECTION, SOLUTION INTRAMUSCULAR; INTRAVENOUS PRN
Status: DISCONTINUED | OUTPATIENT
Start: 2020-05-21 | End: 2020-05-21

## 2020-05-21 RX ORDER — OXYCODONE HYDROCHLORIDE AND ACETAMINOPHEN 5; 325 MG/1; MG/1
1 TABLET ORAL PRN
Status: DISCONTINUED | OUTPATIENT
Start: 2020-05-21 | End: 2020-05-21 | Stop reason: HOSPADM

## 2020-05-21 RX ORDER — PROPOFOL 10 MG/ML
INJECTION, EMULSION INTRAVENOUS PRN
Status: DISCONTINUED | OUTPATIENT
Start: 2020-05-21 | End: 2020-05-21 | Stop reason: SDUPTHER

## 2020-05-21 RX ORDER — LIDOCAINE HYDROCHLORIDE 20 MG/ML
15 SOLUTION OROPHARYNGEAL ONCE
Status: COMPLETED | OUTPATIENT
Start: 2020-05-21 | End: 2020-05-21

## 2020-05-21 RX ORDER — LIDOCAINE HYDROCHLORIDE 10 MG/ML
INJECTION, SOLUTION EPIDURAL; INFILTRATION; INTRACAUDAL; PERINEURAL PRN
Status: DISCONTINUED | OUTPATIENT
Start: 2020-05-21 | End: 2020-05-21

## 2020-05-21 RX ORDER — ONDANSETRON 2 MG/ML
4 INJECTION INTRAMUSCULAR; INTRAVENOUS
Status: COMPLETED | OUTPATIENT
Start: 2020-05-21 | End: 2020-05-21

## 2020-05-21 RX ADMIN — DEXAMETHASONE SODIUM PHOSPHATE 10 MG: 4 INJECTION, SOLUTION INTRAMUSCULAR; INTRAVENOUS at 14:10

## 2020-05-21 RX ADMIN — FENTANYL CITRATE 100 MCG: 50 INJECTION, SOLUTION INTRAMUSCULAR; INTRAVENOUS at 14:04

## 2020-05-21 RX ADMIN — ONDANSETRON 4 MG: 2 INJECTION INTRAMUSCULAR; INTRAVENOUS at 15:42

## 2020-05-21 RX ADMIN — LIDOCAINE HYDROCHLORIDE 100 MG: 10 INJECTION, SOLUTION EPIDURAL; INFILTRATION; INTRACAUDAL; PERINEURAL at 14:04

## 2020-05-21 RX ADMIN — LIDOCAINE HYDROCHLORIDE 15 ML: 20 SOLUTION ORAL; TOPICAL at 11:47

## 2020-05-21 RX ADMIN — SODIUM CHLORIDE: 9 INJECTION, SOLUTION INTRAVENOUS at 10:53

## 2020-05-21 RX ADMIN — PROPOFOL 50 MG: 10 INJECTION, EMULSION INTRAVENOUS at 15:06

## 2020-05-21 RX ADMIN — ONDANSETRON 4 MG: 2 INJECTION INTRAMUSCULAR; INTRAVENOUS at 14:37

## 2020-05-21 RX ADMIN — MIDAZOLAM 2 MG: 1 INJECTION INTRAMUSCULAR; INTRAVENOUS at 13:54

## 2020-05-21 RX ADMIN — FAMOTIDINE 20 MG: 10 INJECTION, SOLUTION INTRAVENOUS at 11:14

## 2020-05-21 RX ADMIN — SODIUM CHLORIDE: 9 INJECTION, SOLUTION INTRAVENOUS at 14:50

## 2020-05-21 RX ADMIN — PROPOFOL 150 MG: 10 INJECTION, EMULSION INTRAVENOUS at 14:04

## 2020-05-21 RX ADMIN — Medication 160 MG: at 14:04

## 2020-05-21 ASSESSMENT — PULMONARY FUNCTION TESTS
PIF_VALUE: 25
PIF_VALUE: 25
PIF_VALUE: 24
PIF_VALUE: 3
PIF_VALUE: 25
PIF_VALUE: 25
PIF_VALUE: 23
PIF_VALUE: 24
PIF_VALUE: 3
PIF_VALUE: 12
PIF_VALUE: 4
PIF_VALUE: 23
PIF_VALUE: 21
PIF_VALUE: 26
PIF_VALUE: 25
PIF_VALUE: 25
PIF_VALUE: 24
PIF_VALUE: 24
PIF_VALUE: 23
PIF_VALUE: 25
PIF_VALUE: 25
PIF_VALUE: 5
PIF_VALUE: 25
PIF_VALUE: 25
PIF_VALUE: 27
PIF_VALUE: 2
PIF_VALUE: 25
PIF_VALUE: 25
PIF_VALUE: 2
PIF_VALUE: 22
PIF_VALUE: 24
PIF_VALUE: 26
PIF_VALUE: 23
PIF_VALUE: 25
PIF_VALUE: 5
PIF_VALUE: 5
PIF_VALUE: 25
PIF_VALUE: 5
PIF_VALUE: 25
PIF_VALUE: 25
PIF_VALUE: 24
PIF_VALUE: 24
PIF_VALUE: 25
PIF_VALUE: 25
PIF_VALUE: 0
PIF_VALUE: 24
PIF_VALUE: 25
PIF_VALUE: 20
PIF_VALUE: 31
PIF_VALUE: 0
PIF_VALUE: 25
PIF_VALUE: 25
PIF_VALUE: 27
PIF_VALUE: 26
PIF_VALUE: 0
PIF_VALUE: 25
PIF_VALUE: 0
PIF_VALUE: 28
PIF_VALUE: 2
PIF_VALUE: 25
PIF_VALUE: 23
PIF_VALUE: 4
PIF_VALUE: 3
PIF_VALUE: 26
PIF_VALUE: 25
PIF_VALUE: 24
PIF_VALUE: 25
PIF_VALUE: 1
PIF_VALUE: 24
PIF_VALUE: 21
PIF_VALUE: 23
PIF_VALUE: 25
PIF_VALUE: 24
PIF_VALUE: 25

## 2020-05-21 ASSESSMENT — ENCOUNTER SYMPTOMS
COUGH: 1
NAUSEA: 0
COLOR CHANGE: 0
EYE ITCHING: 0
BLOOD IN STOOL: 1
ABDOMINAL DISTENTION: 0
VOICE CHANGE: 0
DIARRHEA: 0
CHEST TIGHTNESS: 0
CONSTIPATION: 1
SINUS PAIN: 0
EYE DISCHARGE: 0
ABDOMINAL PAIN: 1
EYE PAIN: 0
SHORTNESS OF BREATH: 0
VOMITING: 0
SORE THROAT: 0
TROUBLE SWALLOWING: 1
RHINORRHEA: 0
WHEEZING: 0
BACK PAIN: 0
RECTAL PAIN: 0

## 2020-05-21 ASSESSMENT — PAIN SCALES - GENERAL: PAINLEVEL_OUTOF10: 0

## 2020-05-21 ASSESSMENT — PAIN - FUNCTIONAL ASSESSMENT: PAIN_FUNCTIONAL_ASSESSMENT: 0-10

## 2020-05-21 NOTE — ANESTHESIA PRE PROCEDURE
Department of Anesthesiology  Preprocedure Note       Name:  Ericka Rolle   Age:  53 y.o.  :  1970                                          MRN:  [id number]         Date:  2020      Surgeon: Fer Dominguez):  Anne Gagnon MD    Procedure: Procedure(s):  EGD ESOPHAGOGASTRODUODENOSCOPY  COLONOSCOPY DIAGNOSTIC    Medications prior to admission:   Prior to Admission medications    Medication Sig Start Date End Date Taking?  Authorizing Provider   oxybutynin (DITROPAN) 5 MG tablet TAKE 1 TABLET BY MOUTH TWICE A DAY 20   Enma Alexander MD   furosemide (LASIX) 20 MG tablet TAKE 1 TABLET DAILY 20   Enma Alexander MD   omeprazole (PRILOSEC) 40 MG delayed release capsule TAKE 1 CAPSULE DAILY 20   Enma Alexander MD   mirabegron (MYRBETRIQ) 50 MG TB24 Take 50 mg by mouth daily 1 pill daily 19   ANDRES Loera CNP   Benzocaine 15 MG LOZG Take 1 lozenge by mouth 3 times daily 19   ANDRES Da Silva CNP   ibuprofen (ADVIL;MOTRIN) 600 MG tablet Take 1 tablet by mouth every 8 hours as needed for Pain 19   ANDRES Da Silva CNP   potassium chloride (MICRO-K) 10 MEQ extended release capsule TAKE 1 CAPSULE DAILY 19   Enma Alexander MD   traZODone (DESYREL) 100 MG tablet TAKE 1 TABLET BY MOUTH NIGHTLY 19   Enma Alexander MD   gabapentin (NEURONTIN) 300 MG capsule TAKE 1 CAPSULE THREE TIMES A DAY 8/25/19 10/4/19  Enma Alexander MD   atorvastatin (LIPITOR) 40 MG tablet TAKE 1 TABLET DAILY 19   Enma Alexander MD   budesonide-formoterol (SYMBICORT) 160-4.5 MCG/ACT AERO Inhale 2 puffs into the lungs 2 times daily 19   Kathrin Armas MD   ASPIRIN ADULT LOW STRENGTH 81 MG EC tablet TAKE 1 TABLET DAILY 19   Kathrin Armas MD   atenolol (TENORMIN) 50 MG tablet TAKE 1 TABLET DAILY 19   Kathrin Armas MD   levothyroxine (SYNTHROID) 150 MCG tablet TAKE 1 TABLET DAILY 19   Kathrin Armas MD   lisinopril 13.7 07/25/2019     07/25/2019     04/10/2012       CMP:   Lab Results   Component Value Date     07/25/2019    K 4.2 07/25/2019     07/25/2019    CO2 23 07/25/2019    BUN 6 07/25/2019    CREATININE 0.72 07/25/2019    GFRAA >60 04/18/2019    LABGLOM >60 04/18/2019    GLUCOSE 119 07/25/2019    PROT 8.2 08/15/2018    CALCIUM 9.2 07/25/2019    BILITOT 0.36 08/15/2018    ALKPHOS 77 08/15/2018    AST 19 08/15/2018    ALT 15 08/15/2018       POC Tests:   Recent Labs     05/21/20  0939   POCGLU 269*       Coags:   Lab Results   Component Value Date    PROTIME 12.8 11/04/2018    INR 0.96 11/04/2018    APTT 29.5 11/04/2018       HCG (If Applicable):   Lab Results   Component Value Date    PREGTESTUR negative 07/15/2019    HCG NEGATIVE 10/23/2018        ABGs: No results found for: PHART, PO2ART, BZS1CLM, XEN9OGB, BEART, J3YGWLTV     Type & Screen (If Applicable):  No results found for: LABABO, LABRH    Drug/Infectious Status (If Applicable):  No results found for: HIV, HEPCAB    COVID-19 Screening (If Applicable): No results found for: COVID19      Anesthesia Evaluation  Patient summary reviewed and Nursing notes reviewed   history of anesthetic complications: PONV. Airway: Mallampati: III  TM distance: >3 FB   Neck ROM: full  Mouth opening: > = 3 FB Dental: normal exam         Pulmonary:normal exam  breath sounds clear to auscultation  (+) COPD ( On home oxygen therapy 2 liters ):  sleep apnea: on CPAP,                            ROS comment: COVID Test - Negative    Patient had Coronavirus infection in early March, was admitted for 5 days then.     Cardiovascular:    (+) hypertension:, hyperlipidemia      ECG reviewed  Rhythm: regular  Rate: normal           Beta Blocker:  Dose within 24 Hrs         Neuro/Psych:   (+) neuromuscular disease:, psychiatric history: stable with treatment             ROS comment: Bipolar disorder     Lumbosacral spondylosis  GI/Hepatic/Renal:   (+) GERD:, morbid obesity

## 2020-05-21 NOTE — OP NOTE
advanced into the duodenum. Duodenal bulb in the beginning of the second portion of the duodenum were unremarkable. Scope was withdrawn inspecting all these areas carefully again and removed. Patient tolerated this part of the procedure well. Patient was continued in lithotomy position. Rectal exam revealed prominent external hemorrhoids. No masses or bleeding noted. Olympus colonoscopy was advanced through the rectum to the level of cecum through a somewhat tortuous redundant colon. Prep was fair. Scope was withdrawn inspecting the cecum ileocecal valve ascending transverse descending rectosigmoid areas again. There was a small ascending colon polyp noted that was removed with cold biopsy forceps. There was another transverse colon polyp that was removed using cold biopsy forceps. Evidence of sigmoid diverticulosis noted without any evidence of diverticulitis. I did not see any large obstructing lesions. No colitis seen. Again the prep was fair. Visualization was somewhat limited. Scope was withdrawn back into the rectum. Scope was retroflexed. No other pathology was seen. Scope was straightened and removed. Patient tolerated procedure well and was transferred to the recovery room in a stable condition. Recommendations: Discharge to home when criteria met. Outpatient follow-up in the office in 2 weeks to discuss pathology results. High-dose proton pump inhibitors and Carafate. Repeat EGD in 8 weeks.     Electronically signed by Ewa Schroeder MD on 5/21/2020 at 3:08 PM

## 2020-05-21 NOTE — H&P
Allergies   Allergen Reactions    Ioxaglate Anaphylaxis    Iv Dye [Iodides] Anaphylaxis    Loratadine      Legs jump     Claritin-D 12 Hour [Loratadine-Pseudoephedrine Er]     Iodine     Prochlorperazine Edisylate      Compazine     No current facility-administered medications on file prior to encounter.       Current Outpatient Medications on File Prior to Encounter   Medication Sig Dispense Refill    oxybutynin (DITROPAN) 5 MG tablet TAKE 1 TABLET BY MOUTH TWICE A DAY 60 tablet 2    furosemide (LASIX) 20 MG tablet TAKE 1 TABLET DAILY 60 tablet 2    omeprazole (PRILOSEC) 40 MG delayed release capsule TAKE 1 CAPSULE DAILY 30 capsule 3    mirabegron (MYRBETRIQ) 50 MG TB24 Take 50 mg by mouth daily 1 pill daily 30 tablet 1    Benzocaine 15 MG LOZG Take 1 lozenge by mouth 3 times daily 18 lozenge 0    ibuprofen (ADVIL;MOTRIN) 600 MG tablet Take 1 tablet by mouth every 8 hours as needed for Pain 30 tablet 0    potassium chloride (MICRO-K) 10 MEQ extended release capsule TAKE 1 CAPSULE DAILY 28 capsule 2    traZODone (DESYREL) 100 MG tablet TAKE 1 TABLET BY MOUTH NIGHTLY 30 tablet 0    gabapentin (NEURONTIN) 300 MG capsule TAKE 1 CAPSULE THREE TIMES A DAY 90 capsule 0    atorvastatin (LIPITOR) 40 MG tablet TAKE 1 TABLET DAILY 30 tablet 5    budesonide-formoterol (SYMBICORT) 160-4.5 MCG/ACT AERO Inhale 2 puffs into the lungs 2 times daily 3 Inhaler 1    ASPIRIN ADULT LOW STRENGTH 81 MG EC tablet TAKE 1 TABLET DAILY 30 tablet 5    atenolol (TENORMIN) 50 MG tablet TAKE 1 TABLET DAILY 30 tablet 5    levothyroxine (SYNTHROID) 150 MCG tablet TAKE 1 TABLET DAILY 30 tablet 5    lisinopril (PRINIVIL;ZESTRIL) 40 MG tablet TAKE 1 TABLET DAILY 30 tablet 5    metFORMIN (GLUCOPHAGE) 1000 MG tablet TAKE 1 TABLET TWICE A DAY 60 tablet 5    ferrous sulfate 325 (65 Fe) MG EC tablet take 1 tablet by mouth twice a day 60 tablet 3    Incontinence Supply Disposable MISC 1 each by Does not apply route 3 times daily noted in lower legs bilaterally. NEUROLOGIC:  The patient is conscious, alert, oriented. Speech is clear, no facial droop. No focal sensory or motor deficits. PROVISIONAL DIAGNOSES / SURGERY:      1. Gastroesophageal reflux disease  2. Dysphagia  3. Abdominal pain  4. Change in bowel habits  5.  Rectal bleeding    EGD Colonoscopy         Dwight Coto PA-C on 5/21/2020 at 10:35 AM

## 2020-05-26 LAB — SURGICAL PATHOLOGY REPORT: NORMAL

## 2020-06-06 ENCOUNTER — HOSPITAL ENCOUNTER (EMERGENCY)
Age: 50
Discharge: HOME | End: 2020-06-07
Attending: EMERGENCY MEDICINE
Payer: MEDICARE

## 2020-06-06 ENCOUNTER — APPOINTMENT (OUTPATIENT)
Dept: GENERAL RADIOLOGY | Age: 50
End: 2020-06-06
Payer: MEDICARE

## 2020-06-06 ENCOUNTER — APPOINTMENT (OUTPATIENT)
Dept: CT IMAGING | Age: 50
End: 2020-06-06
Payer: MEDICARE

## 2020-06-06 VITALS
TEMPERATURE: 97 F | RESPIRATION RATE: 20 BRPM | HEART RATE: 80 BPM | DIASTOLIC BLOOD PRESSURE: 91 MMHG | WEIGHT: 249 LBS | SYSTOLIC BLOOD PRESSURE: 140 MMHG | OXYGEN SATURATION: 96 % | BODY MASS INDEX: 41.44 KG/M2

## 2020-06-06 PROCEDURE — 6370000000 HC RX 637 (ALT 250 FOR IP): Performed by: STUDENT IN AN ORGANIZED HEALTH CARE EDUCATION/TRAINING PROGRAM

## 2020-06-06 PROCEDURE — 6360000002 HC RX W HCPCS: Performed by: STUDENT IN AN ORGANIZED HEALTH CARE EDUCATION/TRAINING PROGRAM

## 2020-06-06 PROCEDURE — 99284 EMERGENCY DEPT VISIT MOD MDM: CPT

## 2020-06-06 PROCEDURE — 72072 X-RAY EXAM THORAC SPINE 3VWS: CPT

## 2020-06-06 PROCEDURE — 72125 CT NECK SPINE W/O DYE: CPT

## 2020-06-06 PROCEDURE — 72100 X-RAY EXAM L-S SPINE 2/3 VWS: CPT

## 2020-06-06 PROCEDURE — 96372 THER/PROPH/DIAG INJ SC/IM: CPT

## 2020-06-06 PROCEDURE — 70450 CT HEAD/BRAIN W/O DYE: CPT

## 2020-06-06 RX ORDER — KETOROLAC TROMETHAMINE 30 MG/ML
30 INJECTION, SOLUTION INTRAMUSCULAR; INTRAVENOUS ONCE
Status: COMPLETED | OUTPATIENT
Start: 2020-06-06 | End: 2020-06-06

## 2020-06-06 RX ORDER — ACETAMINOPHEN 500 MG
1000 TABLET ORAL EVERY 6 HOURS PRN
Qty: 30 TABLET | Refills: 0 | Status: SHIPPED | OUTPATIENT
Start: 2020-06-06 | End: 2022-08-02

## 2020-06-06 RX ORDER — CYCLOBENZAPRINE HCL 10 MG
10 TABLET ORAL 3 TIMES DAILY PRN
Qty: 20 TABLET | Refills: 0 | Status: SHIPPED | OUTPATIENT
Start: 2020-06-06 | End: 2020-06-13

## 2020-06-06 RX ORDER — ORPHENADRINE CITRATE 30 MG/ML
60 INJECTION INTRAMUSCULAR; INTRAVENOUS ONCE
Status: COMPLETED | OUTPATIENT
Start: 2020-06-06 | End: 2020-06-06

## 2020-06-06 RX ORDER — ACETAMINOPHEN 500 MG
1000 TABLET ORAL ONCE
Status: COMPLETED | OUTPATIENT
Start: 2020-06-06 | End: 2020-06-06

## 2020-06-06 RX ORDER — ONDANSETRON 4 MG/1
4 TABLET, ORALLY DISINTEGRATING ORAL ONCE
Status: COMPLETED | OUTPATIENT
Start: 2020-06-06 | End: 2020-06-06

## 2020-06-06 RX ORDER — LIDOCAINE 50 MG/G
1 PATCH TOPICAL EVERY 24 HOURS
Qty: 30 PATCH | Refills: 0 | Status: SHIPPED | OUTPATIENT
Start: 2020-06-06 | End: 2020-07-06

## 2020-06-06 RX ADMIN — ORPHENADRINE CITRATE 60 MG: 30 INJECTION INTRAMUSCULAR; INTRAVENOUS at 17:24

## 2020-06-06 RX ADMIN — ACETAMINOPHEN 1000 MG: 500 TABLET ORAL at 17:24

## 2020-06-06 RX ADMIN — ONDANSETRON 4 MG: 4 TABLET, ORALLY DISINTEGRATING ORAL at 17:24

## 2020-06-06 RX ADMIN — KETOROLAC TROMETHAMINE 30 MG: 30 INJECTION, SOLUTION INTRAMUSCULAR at 18:55

## 2020-06-06 ASSESSMENT — ENCOUNTER SYMPTOMS
ABDOMINAL PAIN: 0
SHORTNESS OF BREATH: 0
BACK PAIN: 1
COLOR CHANGE: 0
EYE DISCHARGE: 0
EYE REDNESS: 0

## 2020-06-06 ASSESSMENT — PAIN DESCRIPTION - FREQUENCY: FREQUENCY: INTERMITTENT

## 2020-06-06 ASSESSMENT — PAIN DESCRIPTION - ORIENTATION: ORIENTATION: LOWER;MID

## 2020-06-06 ASSESSMENT — PAIN SCALES - GENERAL
PAINLEVEL_OUTOF10: 8

## 2020-06-06 ASSESSMENT — PAIN DESCRIPTION - DESCRIPTORS: DESCRIPTORS: ACHING

## 2020-06-06 ASSESSMENT — PAIN DESCRIPTION - LOCATION: LOCATION: BACK
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
## 2020-06-06 NOTE — ED PROVIDER NOTES
Additional findings are as noted. For APC cases I have personally evaluated and examined the patient in conjunction with the APC and agree with the treatment plan and disposition of the patient as recorded by the APC.     Niki Guthrie MD  Attending Emergency  Physician        Katty Redmond MD  06/06/20 4847

## 2020-06-08 ENCOUNTER — CARE COORDINATION (OUTPATIENT)
Dept: CARE COORDINATION | Age: 50
End: 2020-06-08

## 2020-06-08 NOTE — CARE COORDINATION
office for questions related to their healthcare. CTN/ACM provided contact information for future needs. Reviewed and educated patient on any new and changed medications related to discharge diagnosis     Patient/family/caregiver given information for [facility] Loop and agrees to enroll Declined  Patient's preferred e-mail: N/A   Patient's preferred phone number: N/A  Based on Loop alert triggers, patient will be contacted by nurse care manager for worsening symptoms. Plan for follow-up call in 5-7 days based on severity of symptoms and risk factors.

## 2020-06-15 ENCOUNTER — CARE COORDINATION (OUTPATIENT)
Dept: CARE COORDINATION | Age: 50
End: 2020-06-15

## 2020-06-22 ENCOUNTER — CARE COORDINATION (OUTPATIENT)
Dept: CARE COORDINATION | Age: 50
End: 2020-06-22

## 2020-07-16 ENCOUNTER — HOSPITAL ENCOUNTER (OUTPATIENT)
Dept: PREADMISSION TESTING | Age: 50
Discharge: HOME | End: 2020-07-20
Payer: MEDICARE

## 2020-07-16 PROCEDURE — U0004 COV-19 TEST NON-CDC HGH THRU: HCPCS

## 2020-07-17 LAB
SARS-COV-2, PCR: NOT DETECTED
SARS-COV-2, RAPID: NORMAL
SARS-COV-2: NORMAL
SOURCE: NORMAL

## 2020-07-19 ENCOUNTER — TELEPHONE (OUTPATIENT)
Dept: PRIMARY CARE CLINIC | Age: 50
End: 2020-07-19

## 2020-07-20 ENCOUNTER — HOSPITAL ENCOUNTER (OUTPATIENT)
Age: 50
Setting detail: OUTPATIENT SURGERY
Discharge: HOME | End: 2020-07-20
Attending: SURGERY | Admitting: SURGERY
Payer: MEDICARE

## 2020-07-20 ENCOUNTER — ANESTHESIA EVENT (OUTPATIENT)
Dept: ENDOSCOPY | Age: 50
End: 2020-07-20
Payer: MEDICARE

## 2020-07-20 ENCOUNTER — ANESTHESIA (OUTPATIENT)
Dept: ENDOSCOPY | Age: 50
End: 2020-07-20
Payer: MEDICARE

## 2020-07-20 VITALS — SYSTOLIC BLOOD PRESSURE: 134 MMHG | OXYGEN SATURATION: 91 % | DIASTOLIC BLOOD PRESSURE: 88 MMHG

## 2020-07-20 VITALS
DIASTOLIC BLOOD PRESSURE: 78 MMHG | RESPIRATION RATE: 18 BRPM | HEIGHT: 65 IN | HEART RATE: 66 BPM | OXYGEN SATURATION: 98 % | TEMPERATURE: 97.3 F | WEIGHT: 236 LBS | BODY MASS INDEX: 39.32 KG/M2 | SYSTOLIC BLOOD PRESSURE: 139 MMHG

## 2020-07-20 LAB
-: NORMAL
GLUCOSE BLD-MCNC: 237 MG/DL (ref 65–105)
HCG, PREGNANCY URINE (POC): NEGATIVE

## 2020-07-20 PROCEDURE — 3700000001 HC ADD 15 MINUTES (ANESTHESIA): Performed by: SURGERY

## 2020-07-20 PROCEDURE — 3609012400 HC EGD TRANSORAL BIOPSY SINGLE/MULTIPLE: Performed by: SURGERY

## 2020-07-20 PROCEDURE — 2580000003 HC RX 258: Performed by: ANESTHESIOLOGY

## 2020-07-20 PROCEDURE — 7100000031 HC ASPR PHASE II RECOVERY - ADDTL 15 MIN: Performed by: SURGERY

## 2020-07-20 PROCEDURE — 7100000030 HC ASPR PHASE II RECOVERY - FIRST 15 MIN: Performed by: SURGERY

## 2020-07-20 PROCEDURE — 6360000002 HC RX W HCPCS

## 2020-07-20 PROCEDURE — 7100000001 HC PACU RECOVERY - ADDTL 15 MIN: Performed by: SURGERY

## 2020-07-20 PROCEDURE — 81025 URINE PREGNANCY TEST: CPT

## 2020-07-20 PROCEDURE — 82947 ASSAY GLUCOSE BLOOD QUANT: CPT

## 2020-07-20 PROCEDURE — 88305 TISSUE EXAM BY PATHOLOGIST: CPT

## 2020-07-20 PROCEDURE — 2709999900 HC NON-CHARGEABLE SUPPLY: Performed by: SURGERY

## 2020-07-20 PROCEDURE — 3700000000 HC ANESTHESIA ATTENDED CARE: Performed by: SURGERY

## 2020-07-20 PROCEDURE — 7100000000 HC PACU RECOVERY - FIRST 15 MIN: Performed by: SURGERY

## 2020-07-20 PROCEDURE — 6370000000 HC RX 637 (ALT 250 FOR IP): Performed by: SURGERY

## 2020-07-20 RX ORDER — PROPOFOL 10 MG/ML
INJECTION, EMULSION INTRAVENOUS PRN
Status: DISCONTINUED | OUTPATIENT
Start: 2020-07-20 | End: 2020-07-20 | Stop reason: SDUPTHER

## 2020-07-20 RX ORDER — SODIUM CHLORIDE 9 MG/ML
INJECTION, SOLUTION INTRAVENOUS CONTINUOUS
Status: DISCONTINUED | OUTPATIENT
Start: 2020-07-20 | End: 2020-07-20 | Stop reason: HOSPADM

## 2020-07-20 RX ORDER — LIDOCAINE HYDROCHLORIDE 20 MG/ML
15 SOLUTION OROPHARYNGEAL ONCE
Status: COMPLETED | OUTPATIENT
Start: 2020-07-20 | End: 2020-07-20

## 2020-07-20 RX ADMIN — PROPOFOL 20 MG: 10 INJECTION, EMULSION INTRAVENOUS at 08:33

## 2020-07-20 RX ADMIN — PROPOFOL 20 MG: 10 INJECTION, EMULSION INTRAVENOUS at 08:34

## 2020-07-20 RX ADMIN — PROPOFOL 10 MG: 10 INJECTION, EMULSION INTRAVENOUS at 08:39

## 2020-07-20 RX ADMIN — SODIUM CHLORIDE: 9 INJECTION, SOLUTION INTRAVENOUS at 08:13

## 2020-07-20 RX ADMIN — PROPOFOL 10 MG: 10 INJECTION, EMULSION INTRAVENOUS at 08:48

## 2020-07-20 RX ADMIN — PROPOFOL 10 MG: 10 INJECTION, EMULSION INTRAVENOUS at 08:36

## 2020-07-20 RX ADMIN — PROPOFOL 20 MG: 10 INJECTION, EMULSION INTRAVENOUS at 08:35

## 2020-07-20 RX ADMIN — PROPOFOL 10 MG: 10 INJECTION, EMULSION INTRAVENOUS at 08:37

## 2020-07-20 RX ADMIN — PROPOFOL 30 MG: 10 INJECTION, EMULSION INTRAVENOUS at 08:32

## 2020-07-20 RX ADMIN — PROPOFOL 10 MG: 10 INJECTION, EMULSION INTRAVENOUS at 08:47

## 2020-07-20 RX ADMIN — PROPOFOL 10 MG: 10 INJECTION, EMULSION INTRAVENOUS at 08:46

## 2020-07-20 RX ADMIN — PROPOFOL 10 MG: 10 INJECTION, EMULSION INTRAVENOUS at 08:41

## 2020-07-20 RX ADMIN — PROPOFOL 10 MG: 10 INJECTION, EMULSION INTRAVENOUS at 08:43

## 2020-07-20 RX ADMIN — PROPOFOL 10 MG: 10 INJECTION, EMULSION INTRAVENOUS at 08:51

## 2020-07-20 RX ADMIN — PROPOFOL 10 MG: 10 INJECTION, EMULSION INTRAVENOUS at 08:45

## 2020-07-20 RX ADMIN — PROPOFOL 10 MG: 10 INJECTION, EMULSION INTRAVENOUS at 08:38

## 2020-07-20 RX ADMIN — LIDOCAINE HYDROCHLORIDE 15 ML: 20 SOLUTION ORAL; TOPICAL at 08:17

## 2020-07-20 RX ADMIN — PROPOFOL 10 MG: 10 INJECTION, EMULSION INTRAVENOUS at 08:49

## 2020-07-20 RX ADMIN — PROPOFOL 10 MG: 10 INJECTION, EMULSION INTRAVENOUS at 08:40

## 2020-07-20 RX ADMIN — PROPOFOL 10 MG: 10 INJECTION, EMULSION INTRAVENOUS at 08:44

## 2020-07-20 RX ADMIN — PROPOFOL 10 MG: 10 INJECTION, EMULSION INTRAVENOUS at 08:42

## 2020-07-20 ASSESSMENT — PAIN - FUNCTIONAL ASSESSMENT: PAIN_FUNCTIONAL_ASSESSMENT: 0-10

## 2020-07-20 ASSESSMENT — PULMONARY FUNCTION TESTS
PIF_VALUE: 1
PIF_VALUE: 0
PIF_VALUE: 1
PIF_VALUE: 0
PIF_VALUE: 1
PIF_VALUE: 1
PIF_VALUE: 0

## 2020-07-20 ASSESSMENT — PAIN SCALES - GENERAL
PAINLEVEL_OUTOF10: 0

## 2020-07-20 ASSESSMENT — COPD QUESTIONNAIRES: CAT_SEVERITY: NO INTERVAL CHANGE

## 2020-07-20 ASSESSMENT — ENCOUNTER SYMPTOMS: STRIDOR: 0

## 2020-07-20 NOTE — ANESTHESIA PRE PROCEDURE
Fe) MG EC tablet take 1 tablet by mouth twice a day 2/18/19  Yes Carito Hatfield MD   nystatin (MYCOSTATIN) 729961 UNIT/GM powder Apply 3 times daily.  12/29/18  Yes Carito Hatfield MD   meloxicam (MOBIC) 15 MG tablet TAKE 1 TABLET DAILY 12/19/18  Yes Carito Hatfield MD   DULoxetine (CYMBALTA) 30 MG extended release capsule Take 30 mg by mouth daily   Yes Historical Provider, MD [place] 0.01 % SOLN ophthalmic drops Place 1 drop into both eyes nightly  12/22/16  Yes Historical Provider, MD   LATUDA 80 MG TABS tablet Take 80 mg by mouth daily  3/3/16  Yes Historical Provider, MD   sertraline (ZOLOFT) 100 MG tablet Take 1 tablet by mouth nightly 2/26/16  Yes Historical Provider, MD   OLANZapine (ZYPREXA) 10 MG tablet TAKE 1 TABLET AT BEDTIME. 1/8/15  Yes Florin Hinson MD   acetaminophen (APAP EXTRA STRENGTH) 500 MG tablet Take 2 tablets by mouth every 6 hours as needed for Pain 6/6/20   Triny Hadley DO   tiotropium ([place]) 18 MCG inhalation capsule Inhale 18 mcg into the lungs daily    Historical Provider, MD   oxybutynin (DITROPAN) 5 MG tablet TAKE 1 TABLET BY MOUTH TWICE A DAY 4/8/20   Danielle Mcrae MD   mirabegron (MYRBETRIQ) 50 MG TB24 Take 50 mg by mouth daily 1 pill daily 12/26/19   ANDRES Perez CNP   Benzocaine 15 MG LOZG Take 1 lozenge by mouth 3 times daily 11/22/19   ANDRES Nice CNP   ASPIRIN ADULT LOW STRENGTH 81 MG EC tablet TAKE 1 TABLET DAILY 2/20/19   Carito Hatfield MD   Incontinence Supply Disposable MISC 1 each by Does not apply route 3 times daily Diapers 2/18/19   Carito Hatfield MD   albuterol sulfate HFA (VENTOLIN HFA) 108 (90 Base) MCG/ACT inhaler Inhale 2 puffs into the lungs every 6 hours as needed for Wheezing 1/9/19   Obi Waldron MD   ipratropium (ATROVENT HFA) 17 MCG/ACT inhaler Inhale 2 puffs into the lungs every 6 hours 1/9/19   Obi Waldron MD   fluticasone (FLONASE) 50 MCG/ACT nasal spray 1 spray by Each Nare route daily 12/3/18   Tory Ewing MD   hydrOXYzine (VISTARIL) 25 MG capsule Take 25-75 mg by mouth    Historical Provider, MD   nitroGLYCERIN (NITROSTAT) 0.4 MG SL tablet TAKE 1 TABLET UNDER THE TONGUE EVERY 5 MINUTES AS NEEDED FOR CHESTPAIN 9/17/18   Froylan Rodriguez MD   COMFORT EZ PEN NEEDLES 32G X 6 MM MISC USE AS DIRECTED DAILY 8/15/18   Tory Ewing MD   TRUE METRIX BLOOD GLUCOSE TEST strip USE AS DIRECTED DAILY AS NEEDED 12/21/17   Tory Ewing MD   Lancets MISC Use to check sugars daily. 5/12/17   Brenda Elias MD       Current medications:    Current Facility-Administered Medications   Medication Dose Route Frequency Provider Last Rate Last Dose    0.9 % sodium chloride infusion   Intravenous Continuous Lucretia Sapp MD           Allergies:     Allergies   Allergen Reactions    Ioxaglate Anaphylaxis    Iv Dye [Iodides] Anaphylaxis    Loratadine      Legs jump     Claritin-D 12 Hour [Loratadine-Pseudoephedrine Er]     Iodine     Prochlorperazine Edisylate      Compazine       Problem List:    Patient Active Problem List   Diagnosis Code    Hypertension I10    Diabetes mellitus-  insulin depedent type 2 E11.9    Dyslipidemia E78.5    COPD (chronic obstructive pulmonary disease) ([facility]) J44.9    DELGADO on CPAP G47.33, Z99.89    Arthritis M19.90    Bipolar disorder ([facility]) F31.9    Hypothyroid E03.9    Hemorrhoids K64.9    Morbid obesity ([facility]) E66.01    Lower extremity edema R60.0    Lumbosacral spondylosis without myelopathy M47.817    S/P laparoscopic hernia repair Z98.890, Z87.19    Morbid obesity with BMI of 45.0-49.9, adult ([facility]) E66.01, Z68.42    Chronic prescription benzodiazepine use Z79.899    Chronic sacroiliac joint pain M53.3, G89.29    Urge incontinence N39.41    Bipolar disord, crnt episode manic w/o psych features, unsp ([facility]) F31.10    Cannabis dependence, episodic abuse ([facility] F12.20       Past Medical History:        Diagnosis Date    Arthritis     Back pain 10/29/2015    [facility]     COPD (chronic obstructive pulmonary disease) (HCC)     Depression     Diabetes mellitus (HCC)     Fibromyalgia     GERD (gastroesophageal reflux disease)     Glaucoma     Hx of blood clots     DVT  (20yrs ago)    Hyperlipidemia     Hypertension     Lumbosacral spondylosis without myelopathy     Morbid obesity ([facility] 10/12/2015    On home oxygen therapy     2 liters into the cpap machine    Pitting edema     PONV (postoperative nausea and vomiting)     Renal stones     history of renal stones    Sleep apnea     cpap    Thyroid disease     Urge incontinence 1/9/2019    Warts, genital        Past Surgical History:        Procedure Laterality Date    CHOLECYSTECTOMY      COLONOSCOPY N/A 5/21/2020    COLONOSCOPY POLYPECTOMY SNARE/COLD BIOPSY performed by Brett Simpson MD at [facility]  05/02/2017    HERNIA REPAIR N/A 5/2/2017    HERNIA INCISIONAL REPAIR LAPAROSCOPIC ROBOTIC , lysis of adhensions performed by Blanche Giron MD at [address] [place] [address]  10/10/2018    camacho mbnb #1 marcaine xylocaine,    NERVE BLOCK  10/23/2018    camacho si #1 No steroid    TUBAL LIGATION      UPPER GASTROINTESTINAL ENDOSCOPY N/A 5/21/2020    EGD BIOPSY performed by Brett Simpson MD at [facility]       Social History:    Social History     Tobacco Use    Smoking status: Former Smoker     Years: 25.00    Smokeless tobacco: Never Used    Tobacco comment: quit in 2005   Substance Use Topics    Alcohol use:  Yes     Alcohol/week: 10.0 - 12.0 standard drinks     Types: 10 - 12 Cans of beer per week                                Counseling given: Not Answered  Comment: quit in 2005      Vital Signs (Current):   Vitals:    07/20/20 0744   BP: 112/71   Pulse: 66   Resp: 16   Temp: 97.3 °F (36.3 °C)   TempSrc: Infrared   SpO2: 94%   Weight: 236 lb (107 kg)   Height: 5' 5\" (1.651 m)                                              BP Readings from Last 3 Encounters:   07/20/20 112/71   06/06/20 (!) 140/91   05/21/20 (!) 140/80       NPO Status: Time of last liquid consumption: 2355                        Time of last solid consumption: 1430                        Date of last liquid consumption: 07/19/20                        Date of last solid food consumption: 07/19/20    BMI:   Wt Readings from Last 3 Encounters:   07/20/20 236 lb (107 kg)   06/06/20 249 lb (112.9 kg)   05/21/20 252 lb (114.3 kg)     Body mass index is 39.27 kg/m². CBC:   Lab Results   Component Value Date    WBC 7.66 07/25/2019    WBC 10.7 08/20/2018    RBC 4.83 07/25/2019    HGB 12.4 07/25/2019    HCT 40.6 07/25/2019    MCV 84.1 07/25/2019    RDW 13.7 07/25/2019     07/25/2019     04/10/2012       CMP:   Lab Results   Component Value Date     07/25/2019    K 4.2 07/25/2019     07/25/2019    CO2 23 07/25/2019    BUN 6 07/25/2019    CREATININE 0.72 07/25/2019    GFRAA >60 04/18/2019    LABGLOM >60 04/18/2019    GLUCOSE 119 07/25/2019    PROT 8.2 08/15/2018    CALCIUM 9.2 07/25/2019    BILITOT 0.36 08/15/2018    ALKPHOS 77 08/15/2018    AST 19 08/15/2018    ALT 15 08/15/2018       POC Tests: No results for input(s): POCGLU, POCNA, POCK, POCCL, POCBUN, POCHEMO, POCHCT in the last 72 hours.     Coags:   Lab Results   Component Value Date    PROTIME 12.8 11/04/2018    INR 0.96 11/04/2018    APTT 29.5 11/04/2018       HCG (If Applicable):   Lab Results   Component Value Date    PREGTESTUR negative 07/15/2019    HCG NEGATIVE 05/21/2020        ABGs: No results found for: PHART, PO2ART, ZPK1BJM, SIO9BIA, BEART, E0KYEMAU     Type & Screen (If Applicable):  No results found for: LABABO, LABRH    Drug/Infectious Status (If Applicable):  No results found for: HIV, HEPCAB    COVID-19 Screening (If Applicable):   Lab Results   Component Value Date    COVID19 Not Detected 07/16/2020         Anesthesia Evaluation  Patient summary reviewed and Nursing notes reviewed   history of anesthetic complications: PONV.  Airway: Mallampati: III  TM distance: >3 FB   Neck ROM: full  Mouth opening: > = 3 FB Dental: normal exam         Pulmonary: breath sounds clear to auscultation  (+) COPD: no interval change,  sleep apnea: on noncompliant,      (-) rhonchi, wheezes, rales and stridor                           Cardiovascular:    (+) hypertension: no interval change,     (-) murmur, weak pulses,  friction rub, systolic click, carotid bruit,  JVD and peripheral edema    ECG reviewed    Rate: normal  Echocardiogram reviewed                  Neuro/Psych:   (+) neuromuscular disease:, psychiatric history:depression/anxiety             GI/Hepatic/Renal:             Endo/Other:    (+) DiabetesType II DM, , hypothyroidism::., .                 Abdominal:           Vascular:                                        Anesthesia Plan      MAC     ASA 3       Induction: intravenous. Anesthetic plan and risks discussed with patient. Plan discussed with CRNA.                   Avis Oliver MD   7/20/2020

## 2020-07-20 NOTE — OP NOTE
without unusual events. In the recovery room patient was examined and remains hemodynamically stable. Discharge home when criteria met. Recommendations/Plan:   1. F/U Biopsies  2. F/U In Office as instructed  3.  Discussed with the family    Electronically signed by Leslie Maddox MD  on 7/20/2020 at 8:54 AM

## 2020-07-20 NOTE — ANESTHESIA POSTPROCEDURE EVALUATION
POST- ANESTHESIA EVALUATION       Pt Name: Stacia Murillo  MRN: [id number]  [place]: 1970  Date of evaluation: 7/20/2020  Time:  10:53 AM      /78 Comment: pt did not take BP meds today  Pulse 66   Temp 97.3 °F (36.3 °C) (Temporal)   Resp 18   Ht 5' 5\" (1.651 m)   Wt 236 lb (107 kg)   LMP 02/04/2019   SpO2 98%   BMI 39.27 kg/m²      Consciousness Level  Awake  Cardiopulmonary Status  Stable  Pain Adequately Treated YES  Nausea / Vomiting  NO  Adequate Hydration  YES  Anesthesia Related Complications NONE      Electronically signed by Yuridia Blanchard MD on 7/20/2020 at 10:53 AM       Department of Anesthesiology  Postprocedure Note    Patient: Stacia Murillo  MRN: [id number]  YOB: 1970  Date of evaluation: 7/20/2020  Time:  10:53 AM     Procedure Summary     Date:  07/20/20 Room / Location:  [address] [place] [address] 04 / [address] [facility] ENDO    Anesthesia Start:  0827 Anesthesia Stop:  8501    Procedure:  EGD BIOPSY AND PHOTOS (N/A Esophagus) Diagnosis:       (PEPTIC ULCERE DISEASE)      (PAT ON ADMIT)    Surgeon:  Irena Pappas MD Responsible Provider:  Yuridia Blanchard MD    Anesthesia Type:  MAC ASA Status:  3          Anesthesia Type: MAC    Amador Phase I: Amador Score: 9    Amador Phase II: Amador Score: 9    Last vitals: Reviewed and per EMR flowsheets.        Anesthesia Post Evaluation

## 2020-07-20 NOTE — H&P
HISTORY and [facility] [address]       NAME:  Libia Csaas  MRN: [id number]   YOB: 1970   Date: 7/20/2020   Age: 53 y.o. Gender: female       COMPLAINT AND PRESENT HISTORY:      Libia Casas is 53 y.o.,  female, undergoing for EGD. Patient has had previous endoscopies done two month ago. Patient has had previous esophogeal Dilation done. Patient denies any FH of Esophogeal Cancer. Patient complains of frequent heartburn, sometimes awakes one from sleep. Pt has hx of GERD and is taking PPI. foods appear to get stuck in throat; drinking fluids help. Pt admits to regurgitation she said her PPI medication improve the symptoms. Patient denies any Dysphagia. Patient complains of epigastric pains. She rate her pain 4/10 . No diarrhea / constipation, no changes in the color, caliber or consistency of the stools. No fever or chill, no chest pain .    Pt drink pop at 12:18am.     PAST MEDICAL HISTORY     Past Medical History:   Diagnosis Date    Arthritis     Back pain 10/29/2015    Bipolar 1 disorder ([address]     COPD (chronic obstructive pulmonary disease) (HCC)     Depression     Diabetes mellitus (HCC)     Fibromyalgia     GERD (gastroesophageal reflux disease)     Glaucoma     Hx of blood clots     DVT  (20yrs ago)    Hyperlipidemia     Hypertension     Lumbosacral spondylosis without myelopathy     Morbid obesity ([address] 10/12/2015    On home oxygen therapy     2 liters into the cpap machine    Pitting edema     PONV (postoperative nausea and vomiting)     Renal stones     history of renal stones    Sleep apnea     cpap    Thyroid disease     Urge incontinence 1/9/2019    Warts, genital        SURGICAL HISTORY       Past Surgical History:   Procedure Laterality Date    CHOLECYSTECTOMY      COLONOSCOPY N/A 5/21/2020    COLONOSCOPY POLYPECTOMY SNARE/COLD BIOPSY performed by Katherin Mcadams MD at [facility]  05/02/2017 of current or ex partner: Not on file     Emotionally abused: Not on file     Physically abused: Not on file     Forced sexual activity: Not on file   Other Topics Concern    Not on file   Social History Narrative    Not on file           REVIEW OF SYSTEMS      Allergies   Allergen Reactions    Ioxaglate Anaphylaxis    Iv Dye [Iodides] Anaphylaxis    Loratadine      Legs jump     Claritin-D 12 Hour [Loratadine-Pseudoephedrine Er]     Iodine     Prochlorperazine Edisylate      Compazine       No current facility-administered medications on file prior to encounter.       Current Outpatient Medications on File Prior to Encounter   Medication Sig Dispense Refill    acetaminophen (APAP EXTRA STRENGTH) 500 MG tablet Take 2 tablets by mouth every 6 hours as needed for Pain 30 tablet 0    tiotropium (SPIRIVA HANDIHALER) 18 MCG inhalation capsule Inhale 18 mcg into the lungs daily      esomeprazole (NEXIUM) 40 MG delayed release capsule Take 1 capsule by mouth 2 times daily 120 capsule 0    sucralfate (CARAFATE) 1 GM tablet Take 1 tablet by mouth 4 times daily 240 tablet 0    oxybutynin (DITROPAN) 5 MG tablet TAKE 1 TABLET BY MOUTH TWICE A DAY 60 tablet 2    furosemide (LASIX) 20 MG tablet TAKE 1 TABLET DAILY 60 tablet 2    omeprazole (PRILOSEC) 40 MG delayed release capsule TAKE 1 CAPSULE DAILY 30 capsule 3    mirabegron (MYRBETRIQ) 50 MG TB24 Take 50 mg by mouth daily 1 pill daily 30 tablet 1    Benzocaine 15 MG LOZG Take 1 lozenge by mouth 3 times daily 18 lozenge 0    ibuprofen (ADVIL;MOTRIN) 600 MG tablet Take 1 tablet by mouth every 8 hours as needed for Pain 30 tablet 0    potassium chloride (MICRO-K) 10 MEQ extended release capsule TAKE 1 CAPSULE DAILY 28 capsule 2    traZODone (DESYREL) 100 MG tablet TAKE 1 TABLET BY MOUTH NIGHTLY 30 tablet 0    gabapentin (NEURONTIN) 300 MG capsule TAKE 1 CAPSULE THREE TIMES A DAY 90 capsule 0    atorvastatin (LIPITOR) 40 MG tablet TAKE 1 TABLET DAILY 30 tablet 5    ASPIRIN ADULT LOW STRENGTH 81 MG EC tablet TAKE 1 TABLET DAILY 30 tablet 5    atenolol (TENORMIN) 50 MG tablet TAKE 1 TABLET DAILY 30 tablet 5    levothyroxine (SYNTHROID) 150 MCG tablet TAKE 1 TABLET DAILY 30 tablet 5    lisinopril (PRINIVIL;ZESTRIL) 40 MG tablet TAKE 1 TABLET DAILY 30 tablet 5    metFORMIN (GLUCOPHAGE) 1000 MG tablet TAKE 1 TABLET TWICE A DAY 60 tablet 5    ferrous sulfate 325 (65 Fe) MG EC tablet take 1 tablet by mouth twice a day 60 tablet 3    Incontinence Supply Disposable MISC 1 each by Does not apply route 3 times daily Diapers 90 each 1    albuterol sulfate HFA (VENTOLIN HFA) 108 (90 Base) MCG/ACT inhaler Inhale 2 puffs into the lungs every 6 hours as needed for Wheezing 1 Inhaler 3    ipratropium (ATROVENT HFA) 17 MCG/ACT inhaler Inhale 2 puffs into the lungs every 6 hours 1 Inhaler 3    nystatin (MYCOSTATIN) 240169 UNIT/GM powder Apply 3 times daily. 1 Bottle 0    meloxicam (MOBIC) 15 MG tablet TAKE 1 TABLET DAILY 30 tablet 0    fluticasone (FLONASE) 50 MCG/ACT nasal spray 1 spray by Each Nare route daily 1 Bottle 0    hydrOXYzine (VISTARIL) 25 MG capsule Take 25-75 mg by mouth      nitroGLYCERIN (NITROSTAT) 0.4 MG SL tablet TAKE 1 TABLET UNDER THE TONGUE EVERY 5 MINUTES AS NEEDED FOR CHESTPAIN 25 tablet 0    DULoxetine (CYMBALTA) 30 MG extended release capsule Take 30 mg by mouth daily      COMFORT EZ PEN NEEDLES 32G X 6 MM MISC USE AS DIRECTED DAILY 100 each 0    TRUE METRIX BLOOD GLUCOSE TEST strip USE AS DIRECTED DAILY AS NEEDED 100 each 5    Lancets MISC Use to check sugars daily. 100 each 3    LUMIGAN 0.01 % SOLN ophthalmic drops Place 1 drop into both eyes nightly       LATUDA 80 MG TABS tablet Take 80 mg by mouth daily       sertraline (ZOLOFT) 100 MG tablet Take 1 tablet by mouth nightly      OLANZapine (ZYPREXA) 10 MG tablet TAKE 1 TABLET AT BEDTIME. 28 tablet 1       Negative except for what is mentioned in the HPI.      GENERAL PHYSICAL EXAM Vitals: /71   Pulse 66   Temp 97.3 °F (36.3 °C) (Infrared)   Resp 16   Ht 5' 5\" (1.651 m)   Wt 236 lb (107 kg)   LMP 02/04/2019   SpO2 94%   BMI 39.27 kg/m²  Body mass index is 39.27 kg/m². GENERAL APPEARANCE:   Caty Campa is 53 y.o.,  female,  nourished, conscious, alert. Does not appear to be distress or pain at this time. SKIN:  Warm, dry, no cyanosis or jaundice. HEAD:  Normocephalic, atraumatic, no swelling or tenderness. EYES:  Pupils equal, reactive to light. EARS:  No discharge, no marked hearing loss. NOSE:  No rhinorrhea, epistaxis or septal deformity. THROAT:  Not congested. No ulceration bleeding or discharge. NECK:  No stiffness, trachea central.  No palpable masses or L.N.                 CHEST:  Symmetrical and equal on expansion. HEART:  RRR S1 > S2. No audible murmurs or gallops. LUNGS:  Equal on expansion, normal breath sounds. No adventitious sounds. ABDOMEN:  Obese. Soft on palpation. No dysphagia, No localized tenderness. No guarding or rigidity. No palpable hepatosplenomegaly. LYMPHATICS:  No palpable cervical lymphadenopathy. LOCOMOTOR, BACK AND SPINE:  No tenderness or deformities. EXTREMITIES:  Symmetrical, no pretibial edema. Jimbos sign negative. No discoloration or ulcerations. NEUROLOGIC:  The patient is conscious, alert, oriented,Cranial nerve II-XII intact, taste and smell were not examined. No apparent focal sensory or motor deficits.              PROVISIONAL DIAGNOSES / SURGERY:      PEPTIC ULCERE DISEASE   EGD   Patient Active Problem List    Diagnosis Date Noted    Bipolar disord, crnt episode manic w/o psych features, unsp ([facility] 07/30/2019    Cannabis dependence, episodic abuse ([facility] 07/30/2019    Urge incontinence 01/09/2019    Chronic sacroiliac joint pain 10/23/2018    Morbid obesity with BMI of 45.0-49.9, adult ([facility] 09/25/2018    Chronic prescription benzodiazepine use 09/25/2018    S/P laparoscopic hernia repair 05/03/2017    Lumbosacral spondylosis without myelopathy 05/19/2016    Lower extremity edema 10/29/2015    Morbid obesity ([facility] 10/12/2015    Hypertension 03/14/2014    Diabetes mellitus-  insulin depedent type 2 03/14/2014    Dyslipidemia 03/14/2014    COPD (chronic obstructive pulmonary disease) ([facility] 03/14/2014    DELGADO on CPAP 03/14/2014    Arthritis 03/14/2014    Bipolar disorder ([facility] 03/14/2014    Hypothyroid 03/14/2014    Hemorrhoids 03/14/2014           ANDRES Pickett - CNP on 7/20/2020 at 7:49 AM

## 2020-07-21 LAB — SURGICAL PATHOLOGY REPORT: NORMAL

## 2020-08-04 ENCOUNTER — OFFICE VISIT (OUTPATIENT)
Dept: UROLOGY | Age: 50
End: 2020-08-04
Payer: MEDICARE

## 2020-08-04 VITALS — TEMPERATURE: 97.7 F

## 2020-08-04 PROCEDURE — 1036F TOBACCO NON-USER: CPT | Performed by: NURSE PRACTITIONER

## 2020-08-04 PROCEDURE — G8417 CALC BMI ABV UP PARAM F/U: HCPCS | Performed by: NURSE PRACTITIONER

## 2020-08-04 PROCEDURE — 51798 US URINE CAPACITY MEASURE: CPT | Performed by: NURSE PRACTITIONER

## 2020-08-04 PROCEDURE — G8427 DOCREV CUR MEDS BY ELIG CLIN: HCPCS | Performed by: NURSE PRACTITIONER

## 2020-08-04 PROCEDURE — 99214 OFFICE O/P EST MOD 30 MIN: CPT | Performed by: NURSE PRACTITIONER

## 2020-08-04 RX ORDER — POLYETHYLENE GLYCOL 3350 17 G/17G
17 POWDER, FOR SOLUTION ORAL DAILY
Qty: 1530 G | Refills: 1 | Status: SHIPPED | OUTPATIENT
Start: 2020-08-04 | End: 2020-09-03

## 2020-08-04 ASSESSMENT — ENCOUNTER SYMPTOMS
EYE REDNESS: 0
NAUSEA: 1
SHORTNESS OF BREATH: 0
BACK PAIN: 0
VOMITING: 0
ABDOMINAL PAIN: 1
WHEEZING: 0
DIARRHEA: 0
CONSTIPATION: 0
COUGH: 1
EYE PAIN: 0

## 2020-08-04 NOTE — LETTER
[address] [address]  [address] [facility]  Dept: [phone]  Dept Fax: [phone]        8/5/20    Patient: Yamilka Tang  YOB: 1970    Dear Blanca Santos MD,    I had the pleasure of seeing one of your patients, Lokesh Rubio today in the office today. Below are the relevant portions of my assessment and plan of care. IMPRESSION:  1. Incomplete emptying of bladder    2. Urge incontinence    3. Urinary frequency    4. Urgency of urination    5. Nocturia        PLAN:  treat constipation with Miralax     Cut back on pop    Decrease fluids 2 hours before bed    Stop Oxybutynin- will call pharmacy to be sure she is on Oxybutynin- has blister pre-filled meds. Start Myrbetriq sample    Follow up in one month with a PVR. Script for briefs to [employer]. No follow-ups on file. Prescriptions Ordered:  Orders Placed This Encounter   Medications    polyethylene glycol (GLYCOLAX) 17 GM/SCOOP powder     Sig: Take 17 g by mouth daily     Dispense:  1530 g     Refill:  1      Orders Placed:  Orders Placed This Encounter   Procedures    IA MEASUREMENT,POST-VOID RESIDUAL VOLUME BY US,NON-IMAGING         Thank you for allowing me to participate in the care of this patient. I will keep you updated on this patient's follow up and I look forward to serving you and your patients again in the future.     Elton Esteves, APRN - CNP

## 2020-08-04 NOTE — PATIENT INSTRUCTIONS
PVR today- 262 ml      treat constipation with Miralax     Cut back on pop    Decrease fluids 2 hours before bed    Stop Oxybutynin    Start Myrbetriq sample    Follow up in one month with a PVR.      Script for briefs to [employer]

## 2020-08-04 NOTE — PROGRESS NOTES
Review of Systems   Constitutional: Negative for appetite change, chills and fever. Eyes: Positive for visual disturbance. Negative for pain and redness. Respiratory: Positive for cough. Negative for shortness of breath and wheezing. COPD    Cardiovascular: Negative for chest pain and leg swelling. Gastrointestinal: Positive for abdominal pain and nausea. Negative for constipation, diarrhea and vomiting. Genitourinary: Positive for frequency and urgency. Negative for difficulty urinating, dysuria, flank pain and hematuria. Musculoskeletal: Negative for back pain, joint swelling and myalgias. Skin: Positive for rash. Negative for wound. Neurological: Positive for dizziness. Negative for tremors and numbness. Hematological: Does not bruise/bleed easily.

## 2020-08-04 NOTE — PROGRESS NOTES
Brain Cancer Father      Outpatient Medications Marked as Taking for the 8/4/20 encounter (Office Visit) with ANDRES Hoffmann - CNP   Medication Sig Dispense Refill    polyethylene glycol (GLYCOLAX) 17 GM/SCOOP powder Take 17 g by mouth daily 1530 g 1    NONFORMULARY Place 1 drop into both eyes daily Indications: for dry eyes Optase eye drop      acetaminophen (APAP EXTRA STRENGTH) 500 MG tablet Take 2 tablets by mouth every 6 hours as needed for Pain 30 tablet 0    tiotropium (SPIRIVA HANDIHALER) 18 MCG inhalation capsule Inhale 18 mcg into the lungs daily      oxybutynin (DITROPAN) 5 MG tablet TAKE 1 TABLET BY MOUTH TWICE A DAY 60 tablet 2    furosemide (LASIX) 20 MG tablet TAKE 1 TABLET DAILY 60 tablet 2    omeprazole (PRILOSEC) 40 MG delayed release capsule TAKE 1 CAPSULE DAILY 30 capsule 3    Benzocaine 15 MG LOZG Take 1 lozenge by mouth 3 times daily 18 lozenge 0    ibuprofen (ADVIL;MOTRIN) 600 MG tablet Take 1 tablet by mouth every 8 hours as needed for Pain 30 tablet 0    potassium chloride (MICRO-K) 10 MEQ extended release capsule TAKE 1 CAPSULE DAILY 28 capsule 2    traZODone (DESYREL) 100 MG tablet TAKE 1 TABLET BY MOUTH NIGHTLY 30 tablet 0    atorvastatin (LIPITOR) 40 MG tablet TAKE 1 TABLET DAILY 30 tablet 5    ASPIRIN ADULT LOW STRENGTH 81 MG EC tablet TAKE 1 TABLET DAILY 30 tablet 5    atenolol (TENORMIN) 50 MG tablet TAKE 1 TABLET DAILY 30 tablet 5    levothyroxine (SYNTHROID) 150 MCG tablet TAKE 1 TABLET DAILY 30 tablet 5    lisinopril (PRINIVIL;ZESTRIL) 40 MG tablet TAKE 1 TABLET DAILY 30 tablet 5    metFORMIN (GLUCOPHAGE) 1000 MG tablet TAKE 1 TABLET TWICE A DAY 60 tablet 5    ferrous sulfate 325 (65 Fe) MG EC tablet take 1 tablet by mouth twice a day 60 tablet 3    Incontinence Supply Disposable MISC 1 each by Does not apply route 3 times daily Diapers 90 each 1    albuterol sulfate HFA (VENTOLIN HFA) 108 (90 Base) MCG/ACT inhaler Inhale 2 puffs into the lungs every 6 hours as needed for Wheezing 1 Inhaler 3    ipratropium (ATROVENT HFA) 17 MCG/ACT inhaler Inhale 2 puffs into the lungs every 6 hours 1 Inhaler 3    nystatin (MYCOSTATIN) 960403 UNIT/GM powder Apply 3 times daily. 1 Bottle 0    meloxicam (MOBIC) 15 MG tablet TAKE 1 TABLET DAILY 30 tablet 0    fluticasone (FLONASE) 50 MCG/ACT nasal spray 1 spray by Each Nare route daily 1 Bottle 0    hydrOXYzine (VISTARIL) 25 MG capsule Take 25-75 mg by mouth      nitroGLYCERIN (NITROSTAT) 0.4 MG SL tablet TAKE 1 TABLET UNDER THE TONGUE EVERY 5 MINUTES AS NEEDED FOR CHESTPAIN 25 tablet 0    DULoxetine (CYMBALTA) 30 MG extended release capsule Take 30 mg by mouth daily      COMFORT EZ PEN NEEDLES 32G X 6 MM MISC USE AS DIRECTED DAILY 100 each 0    TRUE METRIX BLOOD GLUCOSE TEST strip USE AS DIRECTED DAILY AS NEEDED 100 each 5    Lancets MISC Use to check sugars daily. 100 each 3    LUMIGAN 0.01 % SOLN ophthalmic drops Place 1 drop into both eyes nightly       LATUDA 80 MG TABS tablet Take 80 mg by mouth daily       sertraline (ZOLOFT) 100 MG tablet Take 1 tablet by mouth nightly      OLANZapine (ZYPREXA) 10 MG tablet TAKE 1 TABLET AT BEDTIME. 28 tablet 1      (All medications reviewed and updated by provider sincelast office visit or hospitalization)   Ioxaglate; Iv dye [iodides]; Loratadine; Compazine spansule  [prochlorperazine]; Iodine;  Loratadine-pseudoephedrine er; Prochlorperazine edisylate; and Pseudoephedrine  Social History     Tobacco Use   Smoking Status Former Smoker    Years: 25.00   Smokeless Tobacco Never Used   Tobacco Comment    quit in 2005      (If patient a smoker, smoking cessation counseling offered)     Social History     Substance and Sexual Activity   Alcohol Use Yes    Alcohol/week: 10.0 - 12.0 standard drinks    Types: 10 - 12 Cans of beer per week       REVIEW OF SYSTEMS:  Review of Systems      Physical Exam:      Vitals:    08/04/20 1446   Temp: 97.7 °F (36.5 °C)     There is no height or weight on file to calculate BMI. Patient is a 53 y.o. female in noacute distress and alert and oriented to person, place and time. Physical Exam  Constitutional: Patient in no acute distress. Neuro: Alert andoriented to person, place and time. Psych: Mood normal, affect normal  Skin: No rash noted  HEENT: Head: Normocephalic and atraumatic  Conjunctivae and EOM are normal. Pupils are equal, round  Nose: Normal  Right External Ear: Normal; Left External Ear: Normal  Mouth: Mucosa Moist  Neck: Supple  Lungs: Respiratory effort is normal  Cardiovascular: Warm & Pink  Abdomen: Soft, non-tender, non-distended  Bladder non-tender and not distended. PVR- 262 ml   Musculoskeletal: Normalgait and station      and Plan      1. Incomplete emptying of bladder    2. Urge incontinence    3. Urinary frequency    4. Urgency of urination    5. Nocturia           Plan:     Is in retention: PVR today- 262 ml      treat constipation with Miralax     Cut back on pop    Decrease fluids 2 hours before bed    Stop Oxybutynin- will call pharmacy to be sure she is on Oxybutynin- has blister pre-filled meds. Start Myrbetriq sample    Follow up in one month with a PVR. Script for briefs to [email or web address]. No follow-ups on file. Prescriptions Ordered:  Orders Placed This Encounter   Medications    polyethylene glycol (GLYCOLAX) 17 GM/SCOOP powder     Sig: Take 17 g by mouth daily     Dispense:  1530 g     Refill:  1      Orders Placed:  Orders Placed This Encounter   Procedures    FL MEASUREMENT,POST-VOID RESIDUAL VOLUME BY US,NON-IMAGING            ANDRES Singleton - CNP    reviewed and Agree with the ROS entered by the MA.

## 2020-09-21 ENCOUNTER — TELEPHONE (OUTPATIENT)
Dept: UROLOGY | Age: 50
End: 2020-09-21

## 2020-09-21 NOTE — TELEPHONE ENCOUNTER
Patient called in office wanting to know why her script for briefs wasn't sent to the pharmacy. Adv script was sent with refills on Friday 9/18/20. Contacted pharmacy and spoke with David Wheeler and he states they did receive script for briefs with refills and patient will get them filled.  Patient was notified and verbalized understanding

## 2020-09-23 ENCOUNTER — OFFICE VISIT (OUTPATIENT)
Dept: UROLOGY | Age: 50
End: 2020-09-23
Payer: MEDICARE

## 2020-09-23 VITALS — TEMPERATURE: 98.3 F

## 2020-09-23 PROCEDURE — 1036F TOBACCO NON-USER: CPT | Performed by: NURSE PRACTITIONER

## 2020-09-23 PROCEDURE — G8417 CALC BMI ABV UP PARAM F/U: HCPCS | Performed by: NURSE PRACTITIONER

## 2020-09-23 PROCEDURE — G8427 DOCREV CUR MEDS BY ELIG CLIN: HCPCS | Performed by: NURSE PRACTITIONER

## 2020-09-23 PROCEDURE — 51798 US URINE CAPACITY MEASURE: CPT | Performed by: NURSE PRACTITIONER

## 2020-09-23 PROCEDURE — 99213 OFFICE O/P EST LOW 20 MIN: CPT | Performed by: NURSE PRACTITIONER

## 2020-09-23 RX ORDER — MIRABEGRON 50 MG/1
50 TABLET, FILM COATED, EXTENDED RELEASE ORAL DAILY
Qty: 30 TABLET | Refills: 11 | Status: SHIPPED | OUTPATIENT
Start: 2020-09-23 | End: 2022-08-02

## 2020-09-23 ASSESSMENT — ENCOUNTER SYMPTOMS
BACK PAIN: 0
EYE PAIN: 0
NAUSEA: 0
EYE REDNESS: 0
COUGH: 0
COLOR CHANGE: 0
VOMITING: 0
SHORTNESS OF BREATH: 0
WHEEZING: 0
ABDOMINAL PAIN: 0

## 2020-09-23 NOTE — PROGRESS NOTES
[address] [address]  Dept:  Elpidio Boucher [facility] Urology Office Note - Established    Patient:  Callie Cochran  YOB: 1970  Date: 9/23/2020    The patient is a 53 y.o. female whopresents today for evaluation of the following problems:   Chief Complaint   Patient presents with    Incontinence     med check; pt states she has been out of the medication for 3-4 weeks ago but states symptoms were improved while taking it; pt states she mad more control and emptying better while taking the medication        HPI  Was on Oxybutynin in the past and switched to [facility] in August. Unfortunately she was unable to follow up after one month but did notice improvement. On Myrbetriq she noticed less bladder pressure, less frequency, less urgency, less leakage on Myrbetriq down form 5 to 2-3 pads per day. She was in retention the lst time she was here, PVR was 262, PVR today is 65 ml. She is having irregular stools. She can have several stools in one day, then will not have a stool for 3-4 days. Stools are soft, rarely hard and occasionally loose. She has been off medication now for a few weeks DT appt reschedule. She would like to continue Myrbetriq and stay off Oxybutynin. Summary of old records: N/A    Additional History: N/A    Procedures Today: N/A    Urinalysis today:  No results found for this visit on 09/23/20.     Imaging Reviewed during this Office Visit:   (results were independently reviewed by physician and radiology report verified)    AUA Symptom Score (9/23/2020):  INCOMPLETE EMPTYING: How often have you had the sensation of not emptying your bladder?: About Half the time  FREQUENCY: How often do you have to urinate less than every two hours?: Almost always  INTERMITTENCY: How often have you found you stopped and started again several times when you urinated?: Almost always  URGENCY: How often have you found it difficult to postpone urination?: More than Half the time  WEAK STREAM: How often have you had a weak urinary stream?: Not at all  STRAINING: How often have you had to strain to start  urination?: Not at all  NOCTURIA: How many times did you typically get up at night to uriniate?: 2 Times  TOTAL I-PSS SCORE[de-identified] 19  How would you feel if you were to spend the rest of your life with your urinary condition?: Mostly Dissatisfied  Has been off the Myrbetriq since the beginning of Sept, but as noted in HPI- impprovement on Myrbetriq.    Last BUN and creatinine:  Lab Results   Component Value Date    BUN 6 (L) 07/25/2019     Lab Results   Component Value Date    CREATININE 0.72 07/25/2019       Additional Lab/Culture results:     PAST MEDICAL, FAMILY AND SOCIAL HISTORY UPDATE:  Past Medical History:   Diagnosis Date    Arthritis     Back pain 10/29/2015    Bipolar 1 disorder ([facility]     COPD (chronic obstructive pulmonary disease) ([facility]     Depression     Diabetes mellitus (HCC)     Fibromyalgia     GERD (gastroesophageal reflux disease)     Glaucoma     Hx of blood clots     DVT  (20yrs ago)    Hyperlipidemia     Hypertension     Lumbosacral spondylosis without myelopathy     Morbid obesity ([address] 10/12/2015    On home oxygen therapy     2 liters into the cpap machine    Pitting edema     PONV (postoperative nausea and vomiting)     Renal stones     history of renal stones    Sleep apnea     cpap    Thyroid disease     Urge incontinence 1/9/2019    Warts, genital      Past Surgical History:   Procedure Laterality Date    CHOLECYSTECTOMY      COLONOSCOPY N/A 5/21/2020    COLONOSCOPY POLYPECTOMY SNARE/COLD BIOPSY performed by Joseline Andres MD at [address] [place] [address]  05/02/2017    HERNIA REPAIR N/A 5/2/2017    HERNIA INCISIONAL REPAIR LAPAROSCOPIC ROBOTIC , lysis of adhensions performed by José Glez MD at [place] [address]  10/10/2018    camacho mbnb #1 marcaine xylocaine,  NERVE BLOCK  10/23/2018    camacho si #1 No steroid    TUBAL LIGATION      UPPER GASTROINTESTINAL ENDOSCOPY N/A 5/21/2020    EGD BIOPSY performed by Isabel Johnson MD at [place] [address] N/A 7/20/2020    EGD BIOPSY AND PHOTOS performed by Isabel Johnson MD at [address] [place] [address]     Family History   Problem Relation Age of Onset    Hypertension Mother     Diabetes Mother     COPD Mother     Lung Cancer Father     Brain Cancer Father      Outpatient Medications Marked as Taking for the 9/23/20 encounter (Office Visit) with ANDRES Salas CNP   Medication Sig Dispense Refill    MYRBETRIQ 50 MG TB24 Take 50 mg by mouth daily 30 tablet 11    NONFORMULARY Place 1 drop into both eyes daily Indications: for dry eyes Optase eye drop      acetaminophen (APAP EXTRA STRENGTH) 500 MG tablet Take 2 tablets by mouth every 6 hours as needed for Pain 30 tablet 0    tiotropium (SPIRIVA HANDIHALER) 18 MCG inhalation capsule Inhale 18 mcg into the lungs daily      esomeprazole (NEXIUM) 40 MG delayed release capsule Take 1 capsule by mouth 2 times daily 120 capsule 0    sucralfate (CARAFATE) 1 GM tablet Take 1 tablet by mouth 4 times daily 240 tablet 0    furosemide (LASIX) 20 MG tablet TAKE 1 TABLET DAILY 60 tablet 2    omeprazole (PRILOSEC) 40 MG delayed release capsule TAKE 1 CAPSULE DAILY 30 capsule 3    Benzocaine 15 MG LOZG Take 1 lozenge by mouth 3 times daily 18 lozenge 0    ibuprofen (ADVIL;MOTRIN) 600 MG tablet Take 1 tablet by mouth every 8 hours as needed for Pain 30 tablet 0    potassium chloride (MICRO-K) 10 MEQ extended release capsule TAKE 1 CAPSULE DAILY 28 capsule 2    traZODone (DESYREL) 100 MG tablet TAKE 1 TABLET BY MOUTH NIGHTLY 30 tablet 0    gabapentin (NEURONTIN) 300 MG capsule TAKE 1 CAPSULE THREE TIMES A DAY 90 capsule 0    atorvastatin (LIPITOR) 40 MG tablet TAKE 1 TABLET DAILY 30 tablet 5    ASPIRIN ADULT LOW STRENGTH 81 MG EC tablet TAKE 1 TABLET DAILY 30 tablet 5    atenolol (TENORMIN) 50 MG tablet TAKE 1 TABLET DAILY 30 tablet 5    levothyroxine (SYNTHROID) 150 MCG tablet TAKE 1 TABLET DAILY 30 tablet 5    lisinopril (PRINIVIL;ZESTRIL) 40 MG tablet TAKE 1 TABLET DAILY 30 tablet 5    metFORMIN (GLUCOPHAGE) 1000 MG tablet TAKE 1 TABLET TWICE A DAY 60 tablet 5    ferrous sulfate 325 (65 Fe) MG EC tablet take 1 tablet by mouth twice a day 60 tablet 3    Incontinence Supply Disposable MISC 1 each by Does not apply route 3 times daily Diapers 90 each 1    albuterol sulfate HFA (VENTOLIN HFA) 108 (90 Base) MCG/ACT inhaler Inhale 2 puffs into the lungs every 6 hours as needed for Wheezing 1 Inhaler 3    ipratropium (ATROVENT HFA) 17 MCG/ACT inhaler Inhale 2 puffs into the lungs every 6 hours 1 Inhaler 3    nystatin (MYCOSTATIN) 843628 UNIT/GM powder Apply 3 times daily. 1 Bottle 0    meloxicam (MOBIC) 15 MG tablet TAKE 1 TABLET DAILY 30 tablet 0    fluticasone (FLONASE) 50 MCG/ACT nasal spray 1 spray by Each Nare route daily 1 Bottle 0    hydrOXYzine (VISTARIL) 25 MG capsule Take 25-75 mg by mouth      nitroGLYCERIN (NITROSTAT) 0.4 MG SL tablet TAKE 1 TABLET UNDER THE TONGUE EVERY 5 MINUTES AS NEEDED FOR CHESTPAIN 25 tablet 0    DULoxetine (CYMBALTA) 30 MG extended release capsule Take 30 mg by mouth daily      COMFORT EZ PEN NEEDLES 32G X 6 MM MISC USE AS DIRECTED DAILY 100 each 0    TRUE METRIX BLOOD GLUCOSE TEST strip USE AS DIRECTED DAILY AS NEEDED 100 each 5    Lancets MISC Use to check sugars daily. 100 each 3    LUMIGAN 0.01 % SOLN ophthalmic drops Place 1 drop into both eyes nightly       LATUDA 80 MG TABS tablet Take 80 mg by mouth daily       sertraline (ZOLOFT) 100 MG tablet Take 1 tablet by mouth nightly      OLANZapine (ZYPREXA) 10 MG tablet TAKE 1 TABLET AT BEDTIME. 28 tablet 1      (All medications reviewed and updated by provider sincelast office visit or hospitalization)   Ioxaglate; Iv dye [iodides]; Loratadine;  Compazine spansule [prochlorperazine]; Iodine; Loratadine-pseudoephedrine er; Prochlorperazine edisylate; and Pseudoephedrine  Social History     Tobacco Use   Smoking Status Former Smoker    Years: 25.00   Smokeless Tobacco Never Used   Tobacco Comment    quit in 2005      (If patient a smoker, smoking cessation counseling offered)     Social History     Substance and Sexual Activity   Alcohol Use Yes    Alcohol/week: 10.0 - 12.0 standard drinks    Types: 10 - 12 Cans of beer per week       REVIEW OF SYSTEMS:  Review of Systems      Physical Exam:      Vitals:    09/23/20 0807   Temp: 98.3 °F (36.8 °C)     There is no height or weight on file to calculate BMI. Patient is a 53 y.o. female in noacute distress and alert and oriented to person, place and time. Physical Exam  Constitutional: Patient in no acute distress. Neuro: Alert andoriented to person, place and time. Psych: Mood normal, affect normal  Skin: No rash noted  HEENT: Head: Normocephalic and atraumatic  Conjunctivae and EOM are normal. Pupils are equal, round  Nose: Normal  Right External Ear: Normal; Left External Ear: Normal  Mouth: Mucosa Moist  Neck: Supple  Lungs: Respiratory effort is normal  Cardiovascular: strong and reg, no edema   Abdomen: Soft, non-tender, non-distended  Bladder non-tender and not distended. Musculoskeletal: Normal gait and station      and Plan      1. Incomplete emptying of bladder    2. Urinary frequency    3. Nocturia    4. Urgency of urination    5. Mixed incontinence urge and stress           Plan:    was in urinary retention on Oxybutynin, PVR off Oxybutynin was 65 ml today. On Myrbetriq she has less urgency, frequency, nocturia was 1-2, down form 3-4x. Will restart Myrberiq with sample and see if we can get her back on rotuinely. >50% improvement of OAB and incontinence on Myrbetriq.      Cut back on soda    Timed and double voiding     3 month f/u   Return in about 3 months (around 12/23/2020) for med recheck. Prescriptions Ordered:  Orders Placed This Encounter   Medications    MYRBETRIQ 50 MG TB24     Sig: Take 50 mg by mouth daily     Dispense:  30 tablet     Refill:  11      Orders Placed:  Orders Placed This Encounter   Procedures    TX MEASUREMENT,POST-VOID RESIDUAL VOLUME BY US,NON-IMAGING            ANDRES Farris - CNP    reviewed and Agree with the ROS entered by the MA.

## 2020-09-23 NOTE — PATIENT INSTRUCTIONS
was in urinary retention on Oxybutynin, PVR off Oxybutynin was 65 ml today. On Myrbetriq she has less urgency, frequency, nocturia was 1-2, down form 3-4x.      Will restart Myrberiq with sample and see if we can get her back on rotuinely.  >50% improvement of OAB and incontinence on Myrbetriq.      Cut back on soda     Timed and double voiding      3 month f/u

## 2020-09-23 NOTE — LETTER
[address] [address]  Dept: [phone]  Dept Fax: [phone]        9/23/20    Patient: Eugenio Hutchison  YOB: 1970    Dear Ld Rodriguez MD,    I had the pleasure of seeing one of your patients, Gabi Mcdonough today in the office today. Below are the relevant portions of my assessment and plan of care. IMPRESSION:  1. Incomplete emptying of bladder    2. Urinary frequency    3. Nocturia    4. Urgency of urination    5. Mixed incontinence urge and stress        PLAN:  was in urinary retention on Oxybutynin, PVR off Oxybutynin was 65 ml today. On Myrbetriq she has less urgency, frequency, nocturia was 1-2, down form 3-4x. Will restart Myrberiq with sample and see if we can get her back on rotuinely. >50% improvement of OAB and incontinence on Myrbetriq. Cut back on soda    Timed and double voiding     3 month f/u   No follow-ups on file. Prescriptions Ordered:  Orders Placed This Encounter   Medications    MYRBETRIQ 50 MG TB24     Sig: Take 50 mg by mouth daily     Dispense:  30 tablet     Refill:  11      Orders Placed:  Orders Placed This Encounter   Procedures    ND MEASUREMENT,POST-VOID RESIDUAL VOLUME BY US,NON-IMAGING         Thank you for allowing me to participate in the care of this patient. I will keep you updated on this patient's follow up and I look forward to serving you and your patients again in the future.     Milly Marquis, ANDRES - CNP

## 2021-02-26 ENCOUNTER — TELEPHONE (OUTPATIENT)
Dept: UROLOGY | Age: 51
End: 2021-02-26

## 2021-03-16 ENCOUNTER — TELEPHONE (OUTPATIENT)
Dept: UROLOGY | Age: 51
End: 2021-03-16

## 2021-03-16 ENCOUNTER — OFFICE VISIT (OUTPATIENT)
Dept: UROLOGY | Age: 51
End: 2021-03-16
Payer: MEDICARE

## 2021-03-16 VITALS — HEART RATE: 61 BPM | DIASTOLIC BLOOD PRESSURE: 70 MMHG | TEMPERATURE: 97.7 F | SYSTOLIC BLOOD PRESSURE: 104 MMHG

## 2021-03-16 DIAGNOSIS — N39.46 MIXED INCONTINENCE URGE AND STRESS: Primary | ICD-10-CM

## 2021-03-16 DIAGNOSIS — N20.0 KIDNEY STONE: ICD-10-CM

## 2021-03-16 DIAGNOSIS — R35.0 URINARY FREQUENCY: ICD-10-CM

## 2021-03-16 DIAGNOSIS — R35.1 NOCTURIA: ICD-10-CM

## 2021-03-16 PROCEDURE — G8484 FLU IMMUNIZE NO ADMIN: HCPCS | Performed by: NURSE PRACTITIONER

## 2021-03-16 PROCEDURE — G8428 CUR MEDS NOT DOCUMENT: HCPCS | Performed by: NURSE PRACTITIONER

## 2021-03-16 PROCEDURE — G8417 CALC BMI ABV UP PARAM F/U: HCPCS | Performed by: NURSE PRACTITIONER

## 2021-03-16 PROCEDURE — 1036F TOBACCO NON-USER: CPT | Performed by: NURSE PRACTITIONER

## 2021-03-16 PROCEDURE — 99214 OFFICE O/P EST MOD 30 MIN: CPT | Performed by: NURSE PRACTITIONER

## 2021-03-16 PROCEDURE — 3017F COLORECTAL CA SCREEN DOC REV: CPT | Performed by: NURSE PRACTITIONER

## 2021-03-16 RX ORDER — POLYETHYLENE GLYCOL 3350 17 G/17G
17 POWDER, FOR SOLUTION ORAL DAILY
Qty: 1530 G | Refills: 1 | Status: SHIPPED | OUTPATIENT
Start: 2021-03-16 | End: 2021-04-15

## 2021-03-16 RX ORDER — PYRIDOXINE HCL (VITAMIN B6) 100 MG
100 TABLET ORAL DAILY
COMMUNITY
End: 2022-08-02

## 2021-03-16 RX ORDER — CYCLOBENZAPRINE HCL 5 MG
5 TABLET ORAL 2 TIMES DAILY PRN
COMMUNITY
Start: 2021-01-11 | End: 2022-07-08

## 2021-03-16 RX ORDER — ONDANSETRON 4 MG/1
TABLET, ORALLY DISINTEGRATING ORAL
COMMUNITY
Start: 2021-03-15 | End: 2022-07-08

## 2021-03-16 ASSESSMENT — ENCOUNTER SYMPTOMS
ABDOMINAL PAIN: 0
VOMITING: 0
EYE PAIN: 0
CONSTIPATION: 0
SHORTNESS OF BREATH: 0
DIARRHEA: 0
EYE REDNESS: 0
BACK PAIN: 0
NAUSEA: 0
WHEEZING: 0
COUGH: 0

## 2021-03-16 NOTE — PATIENT INSTRUCTIONS
start  Miralax daily- taper the dose if stool becomes too loose to 1/2 the dose or even less     Continue Myrbetriq     Continue working on weight loss and A1C      UDS - urine symptoms are not at goal. Discussed testing- pt would like to have testing done.

## 2021-03-16 NOTE — LETTER
[address] [address]  Dept: [phone]  Dept Fax: [phone]        3/16/21    Patient: Evonne Pierce  YOB: 1970    Dear Helen Irene MD,    I had the pleasure of seeing one of your patients, Halima Soria today in the office today. Below are the relevant portions of my assessment and plan of care. IMPRESSION:  1. Mixed incontinence urge and stress    2. Urinary frequency    3. Nocturia    4. Kidney stone        PLAN:  start  Miralax daily- taper the dose if stool becomes too loose to 1/2 the dose or even less    Continue Myrbetriq    Continue working on weight loss and A1C     UDS - urine symptoms are not at goal. Discussed testing- pt would like to have testing done. reviewed past CT report, will update renal US for Hx of stone. No follow-ups on file. Prescriptions Ordered:  Orders Placed This Encounter   Medications    polyethylene glycol (GLYCOLAX) 17 GM/SCOOP powder     Sig: Take 17 g by mouth daily     Dispense:  1530 g     Refill:  1      Orders Placed:  Orders Placed This Encounter   Procedures    US RENAL COMPLETE     Standing Status:   Future     Standing Expiration Date:   3/17/2022         Thank you for allowing me to participate in the care of this patient. I will keep you updated on this patient's follow up and I look forward to serving you and your patients again in the future.     Ilene Merlin, APRN - CNP

## 2021-03-19 NOTE — TELEPHONE ENCOUNTER
No PA for [facility] spoke with Carmen Nolasco  Ref# [id number]    No PA for [facility]  Ref#[id number]

## 2021-04-21 ENCOUNTER — HOSPITAL ENCOUNTER (OUTPATIENT)
Age: 51
Discharge: HOME | End: 2021-04-21
Payer: MEDICARE

## 2021-04-21 LAB
CHOLESTEROL/HDL RATIO: 3.2
CHOLESTEROL: 186 MG/DL
HDLC SERPL-MCNC: 58 MG/DL
LDL CHOLESTEROL: 80 MG/DL (ref 0–130)
THYROXINE, FREE: 1.01 NG/DL (ref 0.93–1.7)
TRIGL SERPL-MCNC: 238 MG/DL
TSH SERPL DL<=0.05 MIU/L-ACNC: 6.84 MIU/L (ref 0.3–5)
VLDLC SERPL CALC-MCNC: ABNORMAL MG/DL (ref 1–30)

## 2021-04-21 PROCEDURE — 84439 ASSAY OF FREE THYROXINE: CPT

## 2021-04-21 PROCEDURE — 80061 LIPID PANEL: CPT

## 2021-04-21 PROCEDURE — 84443 ASSAY THYROID STIM HORMONE: CPT

## 2021-04-21 PROCEDURE — 36415 COLL VENOUS BLD VENIPUNCTURE: CPT

## 2021-04-27 ENCOUNTER — HOSPITAL ENCOUNTER (EMERGENCY)
Age: 51
Discharge: HOME | End: 2021-04-27
Attending: EMERGENCY MEDICINE
Payer: MEDICARE

## 2021-04-27 ENCOUNTER — APPOINTMENT (OUTPATIENT)
Dept: GENERAL RADIOLOGY | Age: 51
End: 2021-04-27
Payer: MEDICARE

## 2021-04-27 VITALS
DIASTOLIC BLOOD PRESSURE: 62 MMHG | WEIGHT: 227 LBS | HEART RATE: 63 BPM | BODY MASS INDEX: 37.82 KG/M2 | HEIGHT: 65 IN | RESPIRATION RATE: 16 BRPM | OXYGEN SATURATION: 92 % | TEMPERATURE: 98.6 F | SYSTOLIC BLOOD PRESSURE: 97 MMHG

## 2021-04-27 DIAGNOSIS — R51.9 NONINTRACTABLE HEADACHE, UNSPECIFIED CHRONICITY PATTERN, UNSPECIFIED HEADACHE TYPE: Primary | ICD-10-CM

## 2021-04-27 DIAGNOSIS — J06.9 ACUTE UPPER RESPIRATORY INFECTION: ICD-10-CM

## 2021-04-27 LAB
ABSOLUTE EOS #: 0.29 K/UL (ref 0–0.44)
ABSOLUTE IMMATURE GRANULOCYTE: <0.03 K/UL (ref 0–0.3)
ABSOLUTE LYMPH #: 3.9 K/UL (ref 1.1–3.7)
ABSOLUTE MONO #: 0.65 K/UL (ref 0.1–1.2)
ANION GAP SERPL CALCULATED.3IONS-SCNC: 14 MMOL/L (ref 9–17)
BASOPHILS # BLD: 0 % (ref 0–2)
BASOPHILS ABSOLUTE: 0.04 K/UL (ref 0–0.2)
BILIRUBIN URINE: NEGATIVE
BNP INTERPRETATION: NORMAL
BUN BLDV-MCNC: 14 MG/DL (ref 6–20)
BUN/CREAT BLD: ABNORMAL (ref 9–20)
CALCIUM SERPL-MCNC: 9.6 MG/DL (ref 8.6–10.4)
CHLORIDE BLD-SCNC: 89 MMOL/L (ref 98–107)
CO2: 26 MMOL/L (ref 20–31)
COLOR: YELLOW
COMMENT UA: NORMAL
CREAT SERPL-MCNC: 0.71 MG/DL (ref 0.5–0.9)
D-DIMER QUANTITATIVE: 0.37 MG/L FEU
DIFFERENTIAL TYPE: ABNORMAL
EOSINOPHILS RELATIVE PERCENT: 3 % (ref 1–4)
GFR AFRICAN AMERICAN: >60 ML/MIN
GFR NON-AFRICAN AMERICAN: >60 ML/MIN
GFR SERPL CREATININE-BSD FRML MDRD: ABNORMAL ML/MIN/{1.73_M2}
GFR SERPL CREATININE-BSD FRML MDRD: ABNORMAL ML/MIN/{1.73_M2}
GLUCOSE BLD-MCNC: 229 MG/DL (ref 70–99)
GLUCOSE URINE: NEGATIVE
HCT VFR BLD CALC: 44.2 % (ref 36.3–47.1)
HEMOGLOBIN: 14.8 G/DL (ref 11.9–15.1)
IMMATURE GRANULOCYTES: 0 %
KETONES, URINE: NEGATIVE
LEUKOCYTE ESTERASE, URINE: NEGATIVE
LYMPHOCYTES # BLD: 35 % (ref 24–43)
MCH RBC QN AUTO: 27.2 PG (ref 25.2–33.5)
MCHC RBC AUTO-ENTMCNC: 33.5 G/DL (ref 28.4–34.8)
MCV RBC AUTO: 81.3 FL (ref 82.6–102.9)
MONOCYTES # BLD: 6 % (ref 3–12)
NITRITE, URINE: NEGATIVE
NRBC AUTOMATED: 0 PER 100 WBC
PDW BLD-RTO: 12.6 % (ref 11.8–14.4)
PH UA: 5 (ref 5–8)
PLATELET # BLD: 336 K/UL (ref 138–453)
PLATELET ESTIMATE: ABNORMAL
PMV BLD AUTO: 10.6 FL (ref 8.1–13.5)
POTASSIUM SERPL-SCNC: 4.1 MMOL/L (ref 3.7–5.3)
PRO-BNP: 25 PG/ML
PROTEIN UA: NEGATIVE
RBC # BLD: 5.44 M/UL (ref 3.95–5.11)
RBC # BLD: ABNORMAL 10*6/UL
SARS-COV-2, RAPID: NOT DETECTED
SEG NEUTROPHILS: 56 % (ref 36–65)
SEGMENTED NEUTROPHILS ABSOLUTE COUNT: 6.16 K/UL (ref 1.5–8.1)
SODIUM BLD-SCNC: 129 MMOL/L (ref 135–144)
SPECIFIC GRAVITY UA: 1.01 (ref 1–1.03)
SPECIMEN DESCRIPTION: NORMAL
TROPONIN INTERP: NORMAL
TROPONIN T: NORMAL NG/ML
TROPONIN, HIGH SENSITIVITY: 7 NG/L (ref 0–14)
TURBIDITY: CLEAR
URINE HGB: NEGATIVE
UROBILINOGEN, URINE: NORMAL
WBC # BLD: 11.1 K/UL (ref 3.5–11.3)
WBC # BLD: ABNORMAL 10*3/UL

## 2021-04-27 PROCEDURE — 85379 FIBRIN DEGRADATION QUANT: CPT

## 2021-04-27 PROCEDURE — 85025 COMPLETE CBC W/AUTO DIFF WBC: CPT

## 2021-04-27 PROCEDURE — 83880 ASSAY OF NATRIURETIC PEPTIDE: CPT

## 2021-04-27 PROCEDURE — 87635 SARS-COV-2 COVID-19 AMP PRB: CPT

## 2021-04-27 PROCEDURE — 80048 BASIC METABOLIC PNL TOTAL CA: CPT

## 2021-04-27 PROCEDURE — 71045 X-RAY EXAM CHEST 1 VIEW: CPT

## 2021-04-27 PROCEDURE — 2580000003 HC RX 258: Performed by: STUDENT IN AN ORGANIZED HEALTH CARE EDUCATION/TRAINING PROGRAM

## 2021-04-27 PROCEDURE — 93005 ELECTROCARDIOGRAM TRACING: CPT | Performed by: EMERGENCY MEDICINE

## 2021-04-27 PROCEDURE — 84484 ASSAY OF TROPONIN QUANT: CPT

## 2021-04-27 PROCEDURE — 99285 EMERGENCY DEPT VISIT HI MDM: CPT

## 2021-04-27 PROCEDURE — 6360000002 HC RX W HCPCS: Performed by: STUDENT IN AN ORGANIZED HEALTH CARE EDUCATION/TRAINING PROGRAM

## 2021-04-27 PROCEDURE — 96374 THER/PROPH/DIAG INJ IV PUSH: CPT

## 2021-04-27 PROCEDURE — 96375 TX/PRO/DX INJ NEW DRUG ADDON: CPT

## 2021-04-27 PROCEDURE — 81003 URINALYSIS AUTO W/O SCOPE: CPT

## 2021-04-27 PROCEDURE — 6370000000 HC RX 637 (ALT 250 FOR IP): Performed by: STUDENT IN AN ORGANIZED HEALTH CARE EDUCATION/TRAINING PROGRAM

## 2021-04-27 RX ORDER — KETOROLAC TROMETHAMINE 15 MG/ML
15 INJECTION, SOLUTION INTRAMUSCULAR; INTRAVENOUS ONCE
Status: COMPLETED | OUTPATIENT
Start: 2021-04-27 | End: 2021-04-27

## 2021-04-27 RX ORDER — BENZONATATE 100 MG/1
100 CAPSULE ORAL 3 TIMES DAILY PRN
Qty: 10 CAPSULE | Refills: 0 | Status: SHIPPED | OUTPATIENT
Start: 2021-04-27 | End: 2022-08-02

## 2021-04-27 RX ORDER — ACETAMINOPHEN 500 MG
1000 TABLET ORAL ONCE
Status: COMPLETED | OUTPATIENT
Start: 2021-04-27 | End: 2021-04-27

## 2021-04-27 RX ORDER — DIPHENHYDRAMINE HCL 25 MG
25 TABLET ORAL ONCE
Status: COMPLETED | OUTPATIENT
Start: 2021-04-27 | End: 2021-04-27

## 2021-04-27 RX ORDER — LIDOCAINE 4 G/G
1 PATCH TOPICAL DAILY
Status: DISCONTINUED | OUTPATIENT
Start: 2021-04-27 | End: 2021-04-27 | Stop reason: HOSPADM

## 2021-04-27 RX ORDER — 0.9 % SODIUM CHLORIDE 0.9 %
1000 INTRAVENOUS SOLUTION INTRAVENOUS ONCE
Status: COMPLETED | OUTPATIENT
Start: 2021-04-27 | End: 2021-04-27

## 2021-04-27 RX ORDER — PROMETHAZINE HYDROCHLORIDE 25 MG/ML
12.5 INJECTION, SOLUTION INTRAMUSCULAR; INTRAVENOUS ONCE
Status: COMPLETED | OUTPATIENT
Start: 2021-04-27 | End: 2021-04-27

## 2021-04-27 RX ADMIN — DIPHENHYDRAMINE HCL 25 MG: 25 TABLET ORAL at 19:41

## 2021-04-27 RX ADMIN — SODIUM CHLORIDE 1000 ML: 9 INJECTION, SOLUTION INTRAVENOUS at 18:13

## 2021-04-27 RX ADMIN — PROMETHAZINE HYDROCHLORIDE 12.5 MG: 25 INJECTION INTRAMUSCULAR; INTRAVENOUS at 19:42

## 2021-04-27 RX ADMIN — KETOROLAC TROMETHAMINE 15 MG: 15 INJECTION, SOLUTION INTRAMUSCULAR; INTRAVENOUS at 18:11

## 2021-04-27 RX ADMIN — ACETAMINOPHEN 1000 MG: 500 TABLET ORAL at 18:10

## 2021-04-27 ASSESSMENT — ENCOUNTER SYMPTOMS
NAUSEA: 0
SHORTNESS OF BREATH: 1
DIARRHEA: 0
COUGH: 1
BACK PAIN: 0
VOMITING: 0
ABDOMINAL PAIN: 0

## 2021-04-27 ASSESSMENT — PAIN DESCRIPTION - FREQUENCY: FREQUENCY: CONTINUOUS

## 2021-04-27 ASSESSMENT — PAIN DESCRIPTION - LOCATION: LOCATION: GENERALIZED

## 2021-04-27 NOTE — ED NOTES
Pt resting on cot with eyes closed, even rise and fall of chest, NAD noted. Will continue to monitor.          Jeremi Yanez RN  04/27/21 1923

## 2021-04-27 NOTE — ED NOTES
Pt arrived with complaints that she thinks she has covid. Pt stated that her symptoms started 2 days ago. Pt stated that she feel tired, has nasal congestion, a cough, chest pain and nausea. Pt stated that she called her doctor today with concerns but she couldn't wait for her appointment tomorrow because she feels unwell. Pt respiration are equal and unlabored. Pt placed on full cardiac monitor. Pt stated that chest pain is on the left side and describes it as stabbing. Ekg perfomed. Pt stated that she has green sputum with cough. Resident at the bedside. Will continue to monitor.      Landon Santillan RN  04/27/21 [id number] Patton Hardy [place], RN  04/27/21 9813

## 2021-04-27 NOTE — ED PROVIDER NOTES
[facility] ED  Emergency Department Encounter  EmergencyMedicine Resident     Pt Humera Gardner  MRN: [id number]  [place] 1970  Date of evaluation: 4/27/21  PCP:  Mike Baeza MD    [address] [place] [address]       Chief Complaint   Patient presents with    Chest Pain    Neck Pain    Nausea       HISTORY OF PRESENT ILLNESS  (Location/Symptom, Timing/Onset, Context/Setting, Quality, Duration, Modifying Factors, Severity.)    This patient was evaluated in the Emergency Department for symptoms described in the history of present illness. He/she was evaluated in the context of the global COVID-19 pandemic, which necessitated consideration that the patient might be at risk for infection with the SARS-CoV-2 virus that causes COVID-19. Institutional protocols and algorithms that pertain to the evaluation of patients at risk for COVID-19 are in a state of rapid change based on information released by regulatory bodies including the CDC and federal and state organizations. These policies and algorithms were followed during the patient's care in the ED. Ernie Moody is a 49 y.o. female with a past medical history includes COPD, diabetes, fibromyalgia hypertension, hyperlipidemia, morbid obesity presenting the emergency department today with complaints of generalized body aches, headache, neck pain, shortness of breath. Symptoms been ongoing for the last 3 to 4 days. Progressively worsening. No known sick contact. Reports diffuse weakness and overall fatigue. No modifying factors noted. Denies associated chest pain. No peripheral edema. Patient does admit to having intermittent vaginal bleeding for which she is supposed to follow-up with OB/GYN team.  Patient is concerned that she has COVID-19 infection despite having it back in March of last year. Was given prescription for outpatient Covid testing but came to the emergency department due to worsening symptoms.     PAST MEDICAL / SURGICAL / SOCIAL / FAMILY HISTORY      has a past medical history of Arthritis, Back pain, Bipolar 1 disorder ([facility] COPD (chronic obstructive pulmonary disease) ([facility] Depression, Diabetes mellitus ([facility] Fibromyalgia, GERD (gastroesophageal reflux disease), Glaucoma, Hx of blood clots, Hyperlipidemia, Hypertension, Lumbosacral spondylosis without myelopathy, Morbid obesity ([facility] On home oxygen therapy, Pitting edema, PONV (postoperative nausea and vomiting), Renal stones, Sleep apnea, Thyroid disease, Urge incontinence, and Warts, genital.     has a past surgical history that includes Tubal ligation; Cholecystectomy; hernia repair (05/02/2017); hernia repair (N/A, 5/2/2017); Nerve Block (10/10/2018); Nerve Block (10/23/2018); Upper gastrointestinal endoscopy (N/A, 5/21/2020); Colonoscopy (N/A, 5/21/2020); and Upper gastrointestinal endoscopy (N/A, 7/20/2020). Social History     Socioeconomic History    Marital status:      Spouse name: Not on file    Number of children: Not on file    Years of education: Not on file    Highest education level: Not on file   Occupational History    Not on file   Social Needs    Financial resource strain: Not on file    Food insecurity     Worry: Not on file     Inability: Not on file    Transportation needs     Medical: Not on file     Non-medical: Not on file   Tobacco Use    Smoking status: Former Smoker     Years: 25.00    Smokeless tobacco: Never Used    Tobacco comment: quit in 2005   Substance and Sexual Activity    Alcohol use:  Yes     Alcohol/week: 10.0 - 12.0 standard drinks     Types: 10 - 12 Cans of beer per week    Drug use: Yes     Types: Marijuana    Sexual activity: Yes     Partners: Male     Birth control/protection: Surgical     Comment: S/P tubal ligation   Lifestyle    Physical activity     Days per week: Not on file     Minutes per session: Not on file    Stress: Not on file   Relationships    Social connections     Talks on phone: Not on file     Gets together: Not on file     Attends Rastafari service: Not on file     Active member of club or organization: Not on file     Attends meetings of clubs or organizations: Not on file     Relationship status: Not on file    Intimate partner violence     Fear of current or ex partner: Not on file     Emotionally abused: Not on file     Physically abused: Not on file     Forced sexual activity: Not on file   Other Topics Concern    Not on file   Social History Narrative    Not on file       Family History   Problem Relation Age of Onset    Hypertension Mother     Diabetes Mother     COPD Mother     Lung Cancer Father     Brain Cancer Father        Allergies:  Ioxaglate, Iv dye [iodides], Loratadine, Compazine spansule  [prochlorperazine], Hydrochlorothiazide, Iodine, Loratadine-pseudoephedrine er, Prochlorperazine edisylate, and Pseudoephedrine    Home Medications:  Prior to Admission medications    Medication Sig Start Date End Date Taking?  Authorizing Provider   benzonatate (TESSALON PERLES) 100 MG capsule Take 1 capsule by mouth 3 times daily as needed for Cough 4/27/21  Yes Carroll Olson DO   ondansetron (ZOFRAN-ODT) 4 MG disintegrating tablet  3/15/21   Historical Provider, MD   pyridoxine (B-6) 100 MG tablet Take 100 mg by mouth daily    Historical Provider, MD   Fluticasone furoate-vilanterol (BREO ELLIPTA) 200-25 MCG/INH AEPB inhaler Inhale 1 puff into the lungs 2 times daily 2/22/21   Historical Provider, MD   cyclobenzaprine (FLEXERIL) 5 MG tablet Take 5 mg by mouth 2 times daily as needed 1/11/21   Historical Provider, MD   cyanocobalamin 1000 MCG tablet Take 1,000 mcg by mouth daily    Historical Provider, MD   MYRBETRIQ 50 MG TB24 Take 50 mg by mouth daily 9/23/20   ANDRES Abreu - CNP   NONFORMULARY Place 1 drop into both eyes daily Indications: for dry eyes Optase eye drop    Historical Provider, MD   acetaminophen (APAP EXTRA STRENGTH) 500 MG tablet Take 2 tablets by mouth every 6 hours as needed for Pain 6/6/20   Castillo Foley DO   tiotropium (SPIRIVA HANDIHALER) 18 MCG inhalation capsule Inhale 18 mcg into the lungs daily    Historical Provider, MD   esomeprazole (NEXIUM) 40 MG delayed release capsule Take 1 capsule by mouth 2 times daily 5/21/20 9/23/20  Lillian Sherwood MD   sucralfate (CARAFATE) 1 GM tablet Take 1 tablet by mouth 4 times daily 5/21/20 9/23/20  Lillian Sherwood MD   furosemide (LASIX) 20 MG tablet TAKE 1 TABLET DAILY 4/8/20   Narendra Mancia MD   omeprazole (PRILOSEC) 40 MG delayed release capsule TAKE 1 CAPSULE DAILY 2/28/20   Narendra Mancia MD   Benzocaine 15 MG LOZG Take 1 lozenge by mouth 3 times daily 11/22/19   ANDRES Balderas CNP   ibuprofen (ADVIL;MOTRIN) 600 MG tablet Take 1 tablet by mouth every 8 hours as needed for Pain 11/22/19   ANDRES Balderas CNP   potassium chloride (MICRO-K) 10 MEQ extended release capsule TAKE 1 CAPSULE DAILY 9/26/19   Narendra Mancia MD   traZODone (DESYREL) 100 MG tablet TAKE 1 TABLET BY MOUTH NIGHTLY 8/25/19   Narendra Mancia MD   gabapentin (NEURONTIN) 300 MG capsule TAKE 1 CAPSULE THREE TIMES A DAY 8/25/19 9/23/20  Narendra Mancia MD   atorvastatin (LIPITOR) 40 MG tablet TAKE 1 TABLET DAILY 7/21/19   Kvng Story MD   ASPIRIN ADULT LOW STRENGTH 81 MG EC tablet TAKE 1 TABLET DAILY 2/20/19   Pamela Burrell MD   atenolol (TENORMIN) 50 MG tablet TAKE 1 TABLET DAILY 2/19/19   Pamela Burrell MD   levothyroxine (SYNTHROID) 150 MCG tablet TAKE 1 TABLET DAILY 2/19/19   Pamela Burrell MD   lisinopril (PRINIVIL;ZESTRIL) 40 MG tablet TAKE 1 TABLET DAILY 2/19/19   Pamela Burrell MD   metFORMIN (GLUCOPHAGE) 1000 MG tablet TAKE 1 TABLET TWICE A DAY 2/19/19   Pamela Burrell MD   ferrous sulfate 325 (65 Fe) MG EC tablet take 1 tablet by mouth twice a day 2/18/19   Pamela Burrell MD   Incontinence Supply Disposable MISC 1 each by Does not apply route 3 times daily Diapers 2/18/19   Fidelina Umana MD   albuterol sulfate HFA (VENTOLIN HFA) 108 (90 Base) MCG/ACT inhaler Inhale 2 puffs into the lungs every 6 hours as needed for Wheezing 1/9/19   Obi Wladron MD   ipratropium (ATROVENT HFA) 17 MCG/ACT inhaler Inhale 2 puffs into the lungs every 6 hours 1/9/19   Obi Waldron MD   nystatin (MYCOSTATIN) 854174 UNIT/GM powder Apply 3 times daily. 12/29/18   Fidelina Umana MD   meloxicam (MOBIC) 15 MG tablet TAKE 1 TABLET DAILY 12/19/18   Fidelina Umana MD   fluticasone [facility]) 50 MCG/ACT nasal spray 1 spray by Each Nare route daily 12/3/18   Fidelina Umana MD   hydrOXYzine (VISTARIL) 25 MG capsule Take 25-75 mg by mouth    Historical Provider, MD   nitroGLYCERIN (NITROSTAT) 0.4 MG SL tablet TAKE 1 TABLET UNDER THE TONGUE EVERY 5 MINUTES AS NEEDED FOR CHESTPAIN 9/17/18   Manish Swann MD   DULoxetine (CYMBALTA) 30 MG extended release capsule Take 30 mg by mouth daily    Historical Provider, MD   COMFORT EZ PEN NEEDLES 32G X 6 MM MISC USE AS DIRECTED DAILY 8/15/18   Fidelina Umana MD   TRUE METRIX BLOOD GLUCOSE TEST strip USE AS DIRECTED DAILY AS NEEDED 12/21/17   Fidelina Umana MD   Lancets MISC Use to check sugars daily. 5/12/17   Seferino Jones MD   LUMIGAN 0.01 % SOLN ophthalmic drops Place 1 drop into both eyes nightly  12/22/16   Historical Provider, MD   LATUDA 80 MG TABS tablet Take 80 mg by mouth daily  3/3/16   Historical Provider, MD   sertraline (ZOLOFT) 100 MG tablet Take 1 tablet by mouth nightly 2/26/16   Historical Provider, MD   OLANZapine (ZYPREXA) 10 MG tablet TAKE 1 TABLET AT BEDTIME. 1/8/15   Anita Tyler MD       REVIEW OF SYSTEMS    (2-9 systems for level 4, 10 or more for level 5)      Review of Systems   Constitutional: Positive for fatigue. Negative for chills and fever. HENT: Negative for congestion. Eyes: Negative for visual disturbance. Respiratory: Positive for cough and shortness of breath.     Cardiovascular: Negative for chest pain.   Gastrointestinal: Negative for abdominal pain, diarrhea, nausea and vomiting. Genitourinary: Positive for vaginal bleeding (intermittent (none at this time)). Negative for dysuria and hematuria. Musculoskeletal: Positive for neck pain. Negative for back pain and neck stiffness. Skin: Negative for rash. Neurological: Positive for weakness (generalized) and headaches. Negative for dizziness and numbness. Hematological: Does not bruise/bleed easily. PHYSICAL EXAM   (up to 7 for level 4, 8 or more for level 5)      INITIAL VITALS:   BP 97/62   Pulse 63   Temp 98.6 °F (37 °C)   Resp 16   Ht 5' 5\" (1.651 m)   Wt 227 lb (103 kg)   SpO2 92%   BMI 37.77 kg/m²     Physical Exam  Constitutional:       General: She is not in acute distress. Appearance: She is obese. She is ill-appearing. HENT:      Head: Normocephalic and atraumatic. Nose: Nose normal.      Mouth/Throat:      Mouth: Mucous membranes are dry. Pharynx: No oropharyngeal exudate or posterior oropharyngeal erythema. Eyes:      Extraocular Movements: Extraocular movements intact. Conjunctiva/sclera: Conjunctivae normal.   Neck:      Musculoskeletal: Normal range of motion. No muscular tenderness. Comments: No meningismus. Cardiovascular:      Rate and Rhythm: Normal rate and regular rhythm. Pulses: Normal pulses. Pulmonary:      Effort: Pulmonary effort is normal. No respiratory distress. Breath sounds: No stridor. No wheezing, rhonchi or rales. Abdominal:      General: There is no distension. Palpations: Abdomen is soft. Tenderness: There is no abdominal tenderness. There is no guarding or rebound. Musculoskeletal: Normal range of motion. General: No swelling or deformity. Right lower leg: No edema. Left lower leg: No edema. Comments: No calf tenderness   Skin:     General: Skin is warm and dry. Findings: No rash.    Neurological:      General: No focal deficit present. Mental Status: She is alert and oriented to person, place, and time. Cranial Nerves: No cranial nerve deficit. Sensory: Sensory deficit present. Motor: No weakness. Psychiatric:         Behavior: Behavior normal.         DIFFERENTIAL  DIAGNOSIS     PLAN (LABS / IMAGING / EKG):  Orders Placed This Encounter   Procedures    COVID-19, Rapid    XR CHEST PORTABLE    CBC Auto Differential    Basic Metabolic Panel w/ Reflex to MG    Troponin    Brain Natriuretic Peptide    D-Dimer, Quantitative    Urinalysis Reflex to Culture       MEDICATIONS ORDERED:  Orders Placed This Encounter   Medications    0.9 % sodium chloride bolus    acetaminophen (TYLENOL) tablet 1,000 mg    ketorolac (TORADOL) injection 15 mg    diphenhydrAMINE (BENADRYL) tablet 25 mg    DISCONTD: lidocaine 4 % external patch 1 patch    promethazine (PHENERGAN) injection 12.5 mg    benzonatate (TESSALON PERLES) 100 MG capsule     Sig: Take 1 capsule by mouth 3 times daily as needed for Cough     Dispense:  10 capsule     Refill:  0       DIAGNOSTIC RESULTS / EMERGENCY DEPARTMENT COURSE / MDM     Results for orders placed or performed during the hospital encounter of 04/27/21   COVID-19, Rapid    Specimen: Nasopharyngeal Swab   Result Value Ref Range    Specimen Description . NASOPHARYNGEAL SWAB     SARS-CoV-2, Rapid Not Detected Not Detected   CBC Auto Differential   Result Value Ref Range    WBC 11.1 3.5 - 11.3 k/uL    RBC 5.44 (H) 3.95 - 5.11 m/uL    Hemoglobin 14.8 11.9 - 15.1 g/dL    Hematocrit 44.2 36.3 - 47.1 %    MCV 81.3 (L) 82.6 - 102.9 fL    MCH 27.2 25.2 - 33.5 pg    MCHC 33.5 28.4 - 34.8 g/dL    RDW 12.6 11.8 - 14.4 %    Platelets 157 733 - 156 k/uL    MPV 10.6 8.1 - 13.5 fL    NRBC Automated 0.0 0.0 per 100 WBC    Differential Type NOT REPORTED     Seg Neutrophils 56 36 - 65 %    Lymphocytes 35 24 - 43 %    Monocytes 6 3 - 12 %    Eosinophils % 3 1 - 4 %    Basophils 0 0 - 2 %    Immature Granulocytes 0 0 %    Segs Absolute 6.16 1.50 - 8.10 k/uL    Absolute Lymph # 3.90 (H) 1.10 - 3.70 k/uL    Absolute Mono # 0.65 0.10 - 1.20 k/uL    Absolute Eos # 0.29 0.00 - 0.44 k/uL    Basophils Absolute 0.04 0.00 - 0.20 k/uL    Absolute Immature Granulocyte <0.03 0.00 - 0.30 k/uL    WBC Morphology NOT REPORTED     RBC Morphology MICROCYTOSIS PRESENT     Platelet Estimate NOT REPORTED    Basic Metabolic Panel w/ Reflex to MG   Result Value Ref Range    Glucose 229 (H) 70 - 99 mg/dL    BUN 14 6 - 20 mg/dL    CREATININE 0.71 0.50 - 0.90 mg/dL    Bun/Cre Ratio NOT REPORTED 9 - 20    Calcium 9.6 8.6 - 10.4 mg/dL    Sodium 129 (L) 135 - 144 mmol/L    Potassium 4.1 3.7 - 5.3 mmol/L    Chloride 89 (L) 98 - 107 mmol/L    CO2 26 20 - 31 mmol/L    Anion Gap 14 9 - 17 mmol/L    GFR Non-African American >60 >60 mL/min    GFR African American >60 >60 mL/min    GFR Comment          GFR Staging NOT REPORTED    Troponin   Result Value Ref Range    Troponin, High Sensitivity 7 0 - 14 ng/L    Troponin T NOT REPORTED <0.03 ng/mL    Troponin Interp NOT REPORTED    Brain Natriuretic Peptide   Result Value Ref Range    Pro-BNP 25 <300 pg/mL    BNP Interpretation Pro-BNP Reference Range:    D-Dimer, Quantitative   Result Value Ref Range    D-Dimer, Quant 0.37 mg/L FEU   Urinalysis Reflex to Culture    Specimen: Urine, clean catch   Result Value Ref Range    Color, UA YELLOW YELLOW    Turbidity UA CLEAR CLEAR    Glucose, Ur NEGATIVE NEGATIVE    Bilirubin Urine NEGATIVE NEGATIVE    Ketones, Urine NEGATIVE NEGATIVE    Specific Gravity, UA 1.006 1.005 - 1.030    Urine Hgb NEGATIVE NEGATIVE    pH, UA 5.0 5.0 - 8.0    Protein, UA NEGATIVE NEGATIVE    Urobilinogen, Urine Normal Normal    Nitrite, Urine NEGATIVE NEGATIVE    Leukocyte Esterase, Urine NEGATIVE NEGATIVE    Urinalysis Comments       Microscopic exam not performed based on chemical results unless requested in original order.          RADIOLOGY:  XR CHEST PORTABLE   Final Result   No acute process. EKG  EKG Interpretation    Interpreted by me    Rhythm: normal sinus   Rate: normal, rate of 63  Axis: normal  Ectopy: none  Conduction: normal  ST Segments: no acute change  T Waves: no acute change  Q Waves: Lead III and aVF    Clinical Impression: Normal sinus rhythm, low voltage, inferior infarct age indeterminate, poor R wave progression, no acute ST changes    All EKG's are interpreted by the Emergency Department Physician who either signs or Co-signs this chart in the absence of a cardiologist.    IMPRESSION/MDM/EMERGENCY DEPARTMENT COURSE:  Patient came to emergency department, HPI and physical exam were conducted. All nursing notes were reviewed. 44-year-old female present emergency department with complaints of URI-like symptoms over the past 3 to 4 days. Aggressively worsening. Concerned that she might have Covid again. Patient was screened and has no clinical signs or symptoms of a CoVID-19 infection at this time. However, given current pandemic and atypical presentations, face mask, eye protection, surgical cap, and gloves were worn during examination. Patient was wearing surgical mask. Vitals within normal limits. Patient appears ill but not acutely toxic. No acute distress. Differential includes URI, COVID-19, pneumonia, COPD exacerbation, PE, ACS, pericardial effusion, dehydration, UTI. Will obtain cardiac work-up, urinalysis, D-dimer, chest x-ray, Covid testing. Will give fluids Tylenol, Motrin. Reassess. ED Course as of Apr 27 2317   Tue Apr 27, 2021   1848 WBC: 11.1 [ZT]   1848 Hemoglobin Quant: 14.8 [ZT]   1853 D-Dimer, Quant: 0.37 [ZT]   1907 Pro-BNP: 25 [ZT]   1907 Troponin, High Sensitivity: 7 [ZT]   1907 D-Dimer, Quant: 0.37 [ZT]   1952 SARS-CoV-2, Rapid: Not Detected [ZT]      ED Course User Index  [ZT] Bhanu Games, DO     Work-up grossly unremarkable with exception of mild hyponatremia, after correcting for glucose is 132.

## 2021-04-27 NOTE — ED PROVIDER NOTES
[facility]     Emergency Department     Faculty Attestation    I performed a history and physical examination of the patient and discussed management with the resident. I reviewed the resident´s note and agree with the documented findings and plan of care. Any areas of disagreement are noted on the chart. I was personally present for the key portions of any procedures. I have documented in the chart those procedures where I was not present during the key portions. I have reviewed the emergency nurses triage note. I agree with the chief complaint, past medical history, past surgical history, allergies, medications, social and family history as documented unless otherwise noted below. For Physician Assistant/ Nurse Practitioner cases/documentation I have personally evaluated this patient and have completed at least one if not all key elements of the E/M (history, physical exam, and MDM). Additional findings are as noted. Awake alert and oriented, chest clear, heart exam normal, abdomen obese and nontender, no lower extremity pain or swelling on examination. Patient states she feels like she did when she had Covid last March.        EKG Interpretation    Interpreted by emergency department physician    Rhythm: normal sinus   Rate: normal/63  Axis: normal/-10  Ectopy: none  Conduction: normal  ST Segments: no acute change  T Waves: no acute change  Q Waves: Inferior  Poor R wave progression    Clinical Impression: Abnormal EKG  ALISSON Gloria MD  04/27/21 [id number]

## 2021-04-28 LAB
EKG ATRIAL RATE: 63 BPM
EKG P AXIS: 17 DEGREES
EKG P-R INTERVAL: 176 MS
EKG Q-T INTERVAL: 410 MS
EKG QRS DURATION: 66 MS
EKG QTC CALCULATION (BAZETT): 419 MS
EKG R AXIS: -10 DEGREES
EKG T AXIS: 85 DEGREES
EKG VENTRICULAR RATE: 63 BPM

## 2021-04-28 PROCEDURE — 93010 ELECTROCARDIOGRAM REPORT: CPT | Performed by: INTERNAL MEDICINE

## 2021-05-06 ENCOUNTER — TELEPHONE (OUTPATIENT)
Dept: UROLOGY | Age: 51
End: 2021-05-06

## 2021-05-11 ENCOUNTER — PROCEDURE VISIT (OUTPATIENT)
Dept: UROLOGY | Age: 51
End: 2021-05-11
Payer: MEDICARE

## 2021-05-11 VITALS
BODY MASS INDEX: 37.82 KG/M2 | HEIGHT: 65 IN | DIASTOLIC BLOOD PRESSURE: 81 MMHG | SYSTOLIC BLOOD PRESSURE: 166 MMHG | HEART RATE: 82 BPM | WEIGHT: 227 LBS | TEMPERATURE: 97.4 F

## 2021-05-11 DIAGNOSIS — R33.9 INCOMPLETE EMPTYING OF BLADDER: ICD-10-CM

## 2021-05-11 DIAGNOSIS — R35.0 URINARY FREQUENCY: ICD-10-CM

## 2021-05-11 DIAGNOSIS — R35.1 NOCTURIA: ICD-10-CM

## 2021-05-11 DIAGNOSIS — N39.46 MIXED INCONTINENCE URGE AND STRESS: Primary | ICD-10-CM

## 2021-05-11 PROCEDURE — 51741 ELECTRO-UROFLOWMETRY FIRST: CPT | Performed by: NURSE PRACTITIONER

## 2021-05-11 PROCEDURE — 51784 ANAL/URINARY MUSCLE STUDY: CPT | Performed by: NURSE PRACTITIONER

## 2021-05-11 PROCEDURE — 51797 INTRAABDOMINAL PRESSURE TEST: CPT | Performed by: NURSE PRACTITIONER

## 2021-05-11 PROCEDURE — 51728 CYSTOMETROGRAM W/VP: CPT | Performed by: NURSE PRACTITIONER

## 2021-05-11 NOTE — PROGRESS NOTES
Uroflow: voided 281 ml, max flow 13ml/sec, aver 10 ml/sec,  ml. PE: cystocle and rectocele     Fill rate: 50 ml/min  1st sensation: 405 ml   1st desire: 415 ml   Strong desire; 420 ml   Capacity: 423- had leak she could not hold back. Voided 284 ml, max flow 11ml/sec, average 7 ml/sec,  ml.     125 ml, 230 ml and 387 produced katie leak, 423 stress with leak, calculated LPP 8, Pves- 99 cmH2O. Is having some testing with GYN at [address]- will discuss plan with them and with Dr Isamar Hernandez.

## 2021-05-13 ENCOUNTER — HOSPITAL ENCOUNTER (EMERGENCY)
Age: 51
Discharge: HOME | End: 2021-05-14
Attending: EMERGENCY MEDICINE
Payer: MEDICARE

## 2021-05-13 ENCOUNTER — APPOINTMENT (OUTPATIENT)
Dept: GENERAL RADIOLOGY | Age: 51
End: 2021-05-13
Payer: MEDICARE

## 2021-05-13 DIAGNOSIS — M79.10 MYALGIA: Primary | ICD-10-CM

## 2021-05-13 DIAGNOSIS — E11.65 TYPE 2 DIABETES MELLITUS WITH HYPERGLYCEMIA, UNSPECIFIED WHETHER LONG TERM INSULIN USE (HCC): ICD-10-CM

## 2021-05-13 LAB
-: ABNORMAL
ABSOLUTE EOS #: 0.14 K/UL (ref 0–0.44)
ABSOLUTE IMMATURE GRANULOCYTE: <0.03 K/UL (ref 0–0.3)
ABSOLUTE LYMPH #: 3.98 K/UL (ref 1.1–3.7)
ABSOLUTE MONO #: 0.62 K/UL (ref 0.1–1.2)
AMORPHOUS: ABNORMAL
BACTERIA: ABNORMAL
BASOPHILS # BLD: 1 % (ref 0–2)
BASOPHILS ABSOLUTE: 0.06 K/UL (ref 0–0.2)
BILIRUBIN URINE: NEGATIVE
CASTS UA: ABNORMAL /LPF (ref 0–8)
COLOR: YELLOW
CRYSTALS, UA: ABNORMAL /HPF
DIFFERENTIAL TYPE: ABNORMAL
EOSINOPHILS RELATIVE PERCENT: 1 % (ref 1–4)
EPITHELIAL CELLS UA: ABNORMAL /HPF (ref 0–5)
GLUCOSE URINE: ABNORMAL
HCG QUALITATIVE: NEGATIVE
HCT VFR BLD CALC: 40.1 % (ref 36.3–47.1)
HEMOGLOBIN: 13.2 G/DL (ref 11.9–15.1)
IMMATURE GRANULOCYTES: 0 %
KETONES, URINE: NEGATIVE
LEUKOCYTE ESTERASE, URINE: NEGATIVE
LYMPHOCYTES # BLD: 38 % (ref 24–43)
MCH RBC QN AUTO: 26.8 PG (ref 25.2–33.5)
MCHC RBC AUTO-ENTMCNC: 32.9 G/DL (ref 28.4–34.8)
MCV RBC AUTO: 81.3 FL (ref 82.6–102.9)
MONOCYTES # BLD: 6 % (ref 3–12)
MUCUS: ABNORMAL
NITRITE, URINE: NEGATIVE
NRBC AUTOMATED: 0 PER 100 WBC
OTHER OBSERVATIONS UA: ABNORMAL
PDW BLD-RTO: 12.5 % (ref 11.8–14.4)
PH UA: 5 (ref 5–8)
PLATELET # BLD: 361 K/UL (ref 138–453)
PLATELET ESTIMATE: ABNORMAL
PMV BLD AUTO: 10.5 FL (ref 8.1–13.5)
PROTEIN UA: NEGATIVE
RBC # BLD: 4.93 M/UL (ref 3.95–5.11)
RBC # BLD: ABNORMAL 10*6/UL
RBC UA: ABNORMAL /HPF (ref 0–4)
RENAL EPITHELIAL, UA: ABNORMAL /HPF
SEG NEUTROPHILS: 54 % (ref 36–65)
SEGMENTED NEUTROPHILS ABSOLUTE COUNT: 5.61 K/UL (ref 1.5–8.1)
SPECIFIC GRAVITY UA: 1.02 (ref 1–1.03)
TRICHOMONAS: ABNORMAL
TURBIDITY: CLEAR
URINE HGB: NEGATIVE
UROBILINOGEN, URINE: NORMAL
WBC # BLD: 10.4 K/UL (ref 3.5–11.3)
WBC # BLD: ABNORMAL 10*3/UL
WBC UA: ABNORMAL /HPF (ref 0–5)
YEAST: ABNORMAL

## 2021-05-13 PROCEDURE — 87077 CULTURE AEROBIC IDENTIFY: CPT

## 2021-05-13 PROCEDURE — 93005 ELECTROCARDIOGRAM TRACING: CPT | Performed by: STUDENT IN AN ORGANIZED HEALTH CARE EDUCATION/TRAINING PROGRAM

## 2021-05-13 PROCEDURE — 71045 X-RAY EXAM CHEST 1 VIEW: CPT

## 2021-05-13 PROCEDURE — 80053 COMPREHEN METABOLIC PANEL: CPT

## 2021-05-13 PROCEDURE — 83690 ASSAY OF LIPASE: CPT

## 2021-05-13 PROCEDURE — 84703 CHORIONIC GONADOTROPIN ASSAY: CPT

## 2021-05-13 PROCEDURE — 6370000000 HC RX 637 (ALT 250 FOR IP): Performed by: STUDENT IN AN ORGANIZED HEALTH CARE EDUCATION/TRAINING PROGRAM

## 2021-05-13 PROCEDURE — 2580000003 HC RX 258: Performed by: STUDENT IN AN ORGANIZED HEALTH CARE EDUCATION/TRAINING PROGRAM

## 2021-05-13 PROCEDURE — 96360 HYDRATION IV INFUSION INIT: CPT

## 2021-05-13 PROCEDURE — 96372 THER/PROPH/DIAG INJ SC/IM: CPT

## 2021-05-13 PROCEDURE — 84484 ASSAY OF TROPONIN QUANT: CPT

## 2021-05-13 PROCEDURE — 85025 COMPLETE CBC W/AUTO DIFF WBC: CPT

## 2021-05-13 PROCEDURE — 81001 URINALYSIS AUTO W/SCOPE: CPT

## 2021-05-13 PROCEDURE — 99284 EMERGENCY DEPT VISIT MOD MDM: CPT

## 2021-05-13 PROCEDURE — 87186 SC STD MICRODIL/AGAR DIL: CPT

## 2021-05-13 PROCEDURE — 87086 URINE CULTURE/COLONY COUNT: CPT

## 2021-05-13 RX ORDER — ACETAMINOPHEN 500 MG
1000 TABLET ORAL ONCE
Status: COMPLETED | OUTPATIENT
Start: 2021-05-13 | End: 2021-05-13

## 2021-05-13 RX ORDER — 0.9 % SODIUM CHLORIDE 0.9 %
500 INTRAVENOUS SOLUTION INTRAVENOUS ONCE
Status: COMPLETED | OUTPATIENT
Start: 2021-05-13 | End: 2021-05-14

## 2021-05-13 RX ORDER — ONDANSETRON 2 MG/ML
4 INJECTION INTRAMUSCULAR; INTRAVENOUS ONCE
Status: DISCONTINUED | OUTPATIENT
Start: 2021-05-14 | End: 2021-05-14 | Stop reason: HOSPADM

## 2021-05-13 RX ADMIN — SODIUM CHLORIDE 500 ML: 9 INJECTION, SOLUTION INTRAVENOUS at 23:21

## 2021-05-13 RX ADMIN — ACETAMINOPHEN 1000 MG: 500 TABLET ORAL at 23:28

## 2021-05-13 ASSESSMENT — PAIN DESCRIPTION - PAIN TYPE: TYPE: ACUTE PAIN

## 2021-05-13 ASSESSMENT — PAIN DESCRIPTION - ORIENTATION: ORIENTATION: RIGHT

## 2021-05-14 VITALS
RESPIRATION RATE: 19 BRPM | DIASTOLIC BLOOD PRESSURE: 58 MMHG | HEART RATE: 84 BPM | OXYGEN SATURATION: 90 % | SYSTOLIC BLOOD PRESSURE: 104 MMHG

## 2021-05-14 LAB
ALBUMIN SERPL-MCNC: 4.2 G/DL (ref 3.5–5.2)
ALBUMIN/GLOBULIN RATIO: 1.3 (ref 1–2.5)
ALP BLD-CCNC: 72 U/L (ref 35–104)
ALT SERPL-CCNC: 12 U/L (ref 5–33)
ANION GAP SERPL CALCULATED.3IONS-SCNC: 13 MMOL/L (ref 9–17)
AST SERPL-CCNC: 19 U/L
BILIRUB SERPL-MCNC: 0.36 MG/DL (ref 0.3–1.2)
BUN BLDV-MCNC: 11 MG/DL (ref 6–20)
BUN/CREAT BLD: ABNORMAL (ref 9–20)
CALCIUM SERPL-MCNC: 9 MG/DL (ref 8.6–10.4)
CHLORIDE BLD-SCNC: 93 MMOL/L (ref 98–107)
CO2: 24 MMOL/L (ref 20–31)
CREAT SERPL-MCNC: 0.77 MG/DL (ref 0.5–0.9)
EKG ATRIAL RATE: 75 BPM
EKG P AXIS: 17 DEGREES
EKG P-R INTERVAL: 176 MS
EKG Q-T INTERVAL: 406 MS
EKG QRS DURATION: 64 MS
EKG QTC CALCULATION (BAZETT): 453 MS
EKG R AXIS: -15 DEGREES
EKG T AXIS: 86 DEGREES
EKG VENTRICULAR RATE: 75 BPM
GFR AFRICAN AMERICAN: >60 ML/MIN
GFR NON-AFRICAN AMERICAN: >60 ML/MIN
GFR SERPL CREATININE-BSD FRML MDRD: ABNORMAL ML/MIN/{1.73_M2}
GFR SERPL CREATININE-BSD FRML MDRD: ABNORMAL ML/MIN/{1.73_M2}
GLUCOSE BLD-MCNC: 410 MG/DL (ref 70–99)
LIPASE: 96 U/L (ref 13–60)
POTASSIUM SERPL-SCNC: 4.3 MMOL/L (ref 3.7–5.3)
SODIUM BLD-SCNC: 130 MMOL/L (ref 135–144)
TOTAL PROTEIN: 7.5 G/DL (ref 6.4–8.3)
TROPONIN INTERP: NORMAL
TROPONIN T: NORMAL NG/ML
TROPONIN, HIGH SENSITIVITY: 6 NG/L (ref 0–14)

## 2021-05-14 PROCEDURE — 6370000000 HC RX 637 (ALT 250 FOR IP): Performed by: STUDENT IN AN ORGANIZED HEALTH CARE EDUCATION/TRAINING PROGRAM

## 2021-05-14 RX ORDER — ONDANSETRON 4 MG/1
4 TABLET, ORALLY DISINTEGRATING ORAL EVERY 8 HOURS PRN
Qty: 20 TABLET | Refills: 0 | Status: SHIPPED | OUTPATIENT
Start: 2021-05-14 | End: 2022-07-08

## 2021-05-14 RX ADMIN — INSULIN LISPRO 6 UNITS: 100 INJECTION, SOLUTION INTRAVENOUS; SUBCUTANEOUS at 00:59

## 2021-05-14 ASSESSMENT — ENCOUNTER SYMPTOMS
WHEEZING: 0
PHOTOPHOBIA: 0
COUGH: 0
VOMITING: 0
NAUSEA: 0
ABDOMINAL PAIN: 1
CONSTIPATION: 0
BACK PAIN: 1
SHORTNESS OF BREATH: 0
RHINORRHEA: 0
CHEST TIGHTNESS: 0
COLOR CHANGE: 0
DIARRHEA: 0

## 2021-05-14 NOTE — ED NOTES
Bed: 21  Expected date:   Expected time:   Means of arrival:   Comments:  ENG 17 CP x2 weeks     Hola Braxton RN  05/13/21 2211
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
## 2021-05-14 NOTE — ED NOTES
Pt came in via EMS. Pt was treated for a bacterial vaginal infection. Pt still having symptoms. Pt just started oral atb. Pt stated she is feeling worse. Pt states she is having right flank pain radiating down and across abdomin. Pt states this as been going on for the last 2 weeks. Connected to cardiac paulo smith at bedside. IV established.  Urine sample received       Nicole Frazier RN  05/13/21 9436

## 2021-05-14 NOTE — ED PROVIDER NOTES
[address] ED  Emergency Department Encounter  EmergencyMedicine Resident     Pt Liz Gardner  MRN: [id number]  [place] 1970  Date of evaluation: 5/13/21  PCP:  Maggi Hudson MD    [address] [place] [address]       Chief Complaint   Patient presents with    Flank Pain    Vaginitis     on atb still feels sick        HISTORY OF PRESENT ILLNESS  (Location/Symptom, Timing/Onset, Context/Setting, Quality, Duration, Modifying Factors, Severity.)      Andres Rincon is a 49 y.o. female who presents with multiple vague complaints brought in by EMS. Patient states she had 2 weeks of flank pain but then points to her chest.  Patient states the pain goes across her chest and she also has diffuse abdominal pain that goes across her belly and affects her back as well. Patient states is been ongoing, chronic, and are worse today and her home Flexeril gabapentin is not cutting it. Patient does not directly answer questions and instead goes on tangents    Patient also states her urine has been smelling the last couple days \"it smells like animal urine when they are about to mate\". Patient also states she is postmenopausal, but had a period last month. Patient recently saw OB, states they treated her for a \"bacterial infection\" with Flagyl and Diflucan. I asked patient directly about the vaginitis complaint multiple times, without success and eliciting what symptoms she is actually having in that region other than the smelly urine.     PAST MEDICAL / SURGICAL / SOCIAL / FAMILY HISTORY      has a past medical history of Arthritis, Back pain, Bipolar 1 disorder ([address] COPD (chronic obstructive pulmonary disease) ([address] Depression, Diabetes mellitus ([address] Fibromyalgia, GERD (gastroesophageal reflux disease), Glaucoma, Hx of blood clots, Hyperlipidemia, Hypertension, Lumbosacral spondylosis without myelopathy, Morbid obesity ([address] On home oxygen therapy, Pitting edema, PONV (postoperative nausea and Not on file   Social History Narrative    Not on file       Family History   Problem Relation Age of Onset    Hypertension Mother     Diabetes Mother    Dasia Draft COPD Mother     Lung Cancer Father     Brain Cancer Father        Allergies:  Ioxaglate, Iv dye [iodides], Loratadine, Compazine spansule  [prochlorperazine], Hydrochlorothiazide, Iodine, Loratadine-pseudoephedrine er, Prochlorperazine edisylate, and Pseudoephedrine    Home Medications:  Prior to Admission medications    Medication Sig Start Date End Date Taking?  Authorizing Provider   ondansetron (ZOFRAN ODT) 4 MG disintegrating tablet Take 1 tablet by mouth every 8 hours as needed for Nausea 5/14/21  Yes Dustin Garner, DO   benzonatate (TESSALON PERLES) 100 MG capsule Take 1 capsule by mouth 3 times daily as needed for Cough 4/27/21   Ruba Thompson,    ondansetron (ZOFRAN-ODT) 4 MG disintegrating tablet  3/15/21   Historical Provider, MD   pyridoxine (B-6) 100 MG tablet Take 100 mg by mouth daily    Historical Provider, MD   Fluticasone furoate-vilanterol (BREO ELLIPTA) 200-25 MCG/INH AEPB inhaler Inhale 1 puff into the lungs 2 times daily 2/22/21   Historical Provider, MD   cyclobenzaprine (FLEXERIL) 5 MG tablet Take 5 mg by mouth 2 times daily as needed 1/11/21   Historical Provider, MD   cyanocobalamin 1000 MCG tablet Take 1,000 mcg by mouth daily    Historical Provider, MD   MYRBETRIQ 50 MG TB24 Take 50 mg by mouth daily 9/23/20   ANDRES Sparks - CNP   NONFORMULARY Place 1 drop into both eyes daily Indications: for dry eyes Optase eye drop    Historical Provider, MD   acetaminophen (APAP EXTRA STRENGTH) 500 MG tablet Take 2 tablets by mouth every 6 hours as needed for Pain 6/6/20   Magda Meter,    tiotropium (SPIRIVA HANDIHALER) 18 MCG inhalation capsule Inhale 18 mcg into the lungs daily    Historical Provider, MD   esomeprazole (NEXIUM) 40 MG delayed release capsule Take 1 capsule by mouth 2 times daily 5/21/20 9/23/20  Jay Jay Doris Clay MD   sucralfate (CARAFATE) 1 GM tablet Take 1 tablet by mouth 4 times daily 5/21/20 9/23/20  Lillian Sherwood MD   furosemide (LASIX) 20 MG tablet TAKE 1 TABLET DAILY 4/8/20   Narendra Mancia MD   omeprazole (PRILOSEC) 40 MG delayed release capsule TAKE 1 CAPSULE DAILY 2/28/20   Narendra Mancia MD   Benzocaine 15 MG LOZG Take 1 lozenge by mouth 3 times daily 11/22/19   ANDRES Balderas CNP   ibuprofen (ADVIL;MOTRIN) 600 MG tablet Take 1 tablet by mouth every 8 hours as needed for Pain 11/22/19   ANDRES Balderas CNP   potassium chloride (MICRO-K) 10 MEQ extended release capsule TAKE 1 CAPSULE DAILY 9/26/19   Narendra Mancia MD   traZODone (DESYREL) 100 MG tablet TAKE 1 TABLET BY MOUTH NIGHTLY 8/25/19   Narendra Mancia MD   gabapentin (NEURONTIN) 300 MG capsule TAKE 1 CAPSULE THREE TIMES A DAY 8/25/19 9/23/20  Narendra Mancia MD   atorvastatin (LIPITOR) 40 MG tablet TAKE 1 TABLET DAILY 7/21/19   Narendra Mancia MD   ASPIRIN ADULT LOW STRENGTH 81 MG EC tablet TAKE 1 TABLET DAILY 2/20/19   Pamela Burrell MD   atenolol (TENORMIN) 50 MG tablet TAKE 1 TABLET DAILY 2/19/19   Pamela Burrell MD   levothyroxine (SYNTHROID) 150 MCG tablet TAKE 1 TABLET DAILY 2/19/19   Pamela uBrrell MD   lisinopril (PRINIVIL;ZESTRIL) 40 MG tablet TAKE 1 TABLET DAILY 2/19/19   Pamela Burrell MD   metFORMIN (GLUCOPHAGE) 1000 MG tablet TAKE 1 TABLET TWICE A DAY 2/19/19   Pamela Burrell MD   ferrous sulfate 325 (65 Fe) MG EC tablet take 1 tablet by mouth twice a day 2/18/19   Pamela Burrell MD   Incontinence Supply Disposable MISC 1 each by Does not apply route 3 times daily Diapers 2/18/19   Pamela Burrell MD   albuterol sulfate HFA (VENTOLIN HFA) 108 (90 Base) MCG/ACT inhaler Inhale 2 puffs into the lungs every 6 hours as needed for Wheezing 1/9/19   Obi Waldron MD   ipratropium (ATROVENT HFA) 17 MCG/ACT inhaler Inhale 2 puffs into the lungs every 6 hours 1/9/19   Obi HARRIS Anne Davila MD   nystatin (MYCOSTATIN) 315199 UNIT/GM powder Apply 3 times daily. 12/29/18   Mike Baeza MD   meloxicam (MOBIC) 15 MG tablet TAKE 1 TABLET DAILY 12/19/18   Mike Baeza MD   fluticasone [facility]) 50 MCG/ACT nasal spray 1 spray by Each Nare route daily 12/3/18   Mike Baeza MD   hydrOXYzine (VISTARIL) 25 MG capsule Take 25-75 mg by mouth    Historical Provider, MD   nitroGLYCERIN (NITROSTAT) 0.4 MG SL tablet TAKE 1 TABLET UNDER THE TONGUE EVERY 5 MINUTES AS NEEDED FOR CHESTPAIN 9/17/18   Charan Gonzalez MD   DULoxetine (CYMBALTA) 30 MG extended release capsule Take 30 mg by mouth daily    Historical Provider, MD   COMFORT EZ PEN NEEDLES 32G X 6 MM MISC USE AS DIRECTED DAILY 8/15/18   Mike Baeza MD   TRUE METRIX BLOOD GLUCOSE TEST strip USE AS DIRECTED DAILY AS NEEDED 12/21/17   Mike Baeza MD   Lancets MISC Use to check sugars daily. 5/12/17   Blake Ravi MD   LUMIGAN 0.01 % SOLN ophthalmic drops Place 1 drop into both eyes nightly  12/22/16   Historical Provider, MD   LATUDA 80 MG TABS tablet Take 80 mg by mouth daily  3/3/16   Historical Provider, MD   sertraline (ZOLOFT) 100 MG tablet Take 1 tablet by mouth nightly 2/26/16   Historical Provider, MD   OLANZapine (ZYPREXA) 10 MG tablet TAKE 1 TABLET AT BEDTIME. 1/8/15   Romain Elliott MD       REVIEW OF SYSTEMS    (2-9 systems for level 4, 10 or more for level 5)      Review of Systems   Constitutional: Negative for chills, diaphoresis, fatigue and fever. HENT: Negative for congestion and rhinorrhea. Eyes: Negative for photophobia and visual disturbance. Respiratory: Negative for cough, chest tightness, shortness of breath and wheezing. Cardiovascular: Negative for chest pain, palpitations and leg swelling. Gastrointestinal: Positive for abdominal pain. Negative for constipation, diarrhea, nausea and vomiting. Endocrine: Negative for polydipsia, polyphagia and polyuria.    Genitourinary: Negative for difficulty urinating, dysuria, flank pain, frequency, urgency, vaginal bleeding, vaginal discharge and vaginal pain. Foul smelling urine   Musculoskeletal: Positive for back pain. Negative for arthralgias, neck pain and neck stiffness. Skin: Negative for color change, pallor and rash. Neurological: Negative for dizziness, weakness, light-headedness and headaches. Psychiatric/Behavioral: Negative for agitation and behavioral problems. PHYSICAL EXAM   (up to 7 for level 4, 8 or more for level 5)      INITIAL VITALS:   BP (!) 104/58   Pulse 84   Resp 19   SpO2 90%     Physical Exam  Vitals signs and nursing note reviewed. Constitutional:       General: She is not in acute distress. Appearance: Normal appearance. She is well-developed. She is not diaphoretic. HENT:      Head: Normocephalic and atraumatic. Nose: Nose normal. No rhinorrhea. Mouth/Throat:      Mouth: Mucous membranes are moist.      Pharynx: Oropharynx is clear. Eyes:      General: No scleral icterus. Conjunctiva/sclera: Conjunctivae normal.      Pupils: Pupils are equal, round, and reactive to light. Neck:      Musculoskeletal: Normal range of motion and neck supple. Vascular: No JVD. Trachea: No tracheal deviation. Cardiovascular:      Rate and Rhythm: Normal rate and regular rhythm. Pulses: Normal pulses. Heart sounds: Normal heart sounds. No murmur. Pulmonary:      Effort: Pulmonary effort is normal. No respiratory distress. Breath sounds: Normal breath sounds. No wheezing. Chest:      Chest wall: No tenderness. Abdominal:      General: Abdomen is flat. Bowel sounds are normal. There is no distension. Palpations: Abdomen is soft. Tenderness: There is abdominal tenderness (Nonfocal, minimal). There is no right CVA tenderness, left CVA tenderness, guarding or rebound. Musculoskeletal: Normal range of motion.          General: Tenderness (Mild paraspinal low back tenderness, no midline CTL tenderness) present. No swelling. Skin:     General: Skin is warm and dry. Capillary Refill: Capillary refill takes less than 2 seconds. Coloration: Skin is not pale. Findings: No erythema. Neurological:      General: No focal deficit present. Mental Status: She is alert and oriented to person, place, and time. Cranial Nerves: No cranial nerve deficit. Sensory: No sensory deficit. Psychiatric:         Mood and Affect: Mood normal.         Behavior: Behavior normal.         DIFFERENTIAL  DIAGNOSIS     PLAN (LABS / IMAGING / EKG):  Orders Placed This Encounter   Procedures    Culture, Urine    XR CHEST PORTABLE    CBC Auto Differential    Comprehensive Metabolic Panel w/ Reflex to MG    Lipase    Troponin    Urinalysis with microscopic    HCG Qualitative, Serum    EKG 12 Lead       MEDICATIONS ORDERED:  Orders Placed This Encounter   Medications    0.9 % sodium chloride bolus    acetaminophen (TYLENOL) tablet 1,000 mg    ondansetron (ZOFRAN) injection 4 mg    insulin lispro (HUMALOG) injection vial 6 Units    ondansetron (ZOFRAN ODT) 4 MG disintegrating tablet     Sig: Take 1 tablet by mouth every 8 hours as needed for Nausea     Dispense:  20 tablet     Refill:  0       DDX: Fibromyalgia, chronic pain, acute exacerbation of chronic pain, cholecystitis, cholelithiasis, lumbar strain, pancreatitis, UTI, [place]    MDM/IMPRESSION: Is a 60-year-old female with chronic pain presenting with acute exacerbation of chronic pain. Triage note noted for vaginitis, however unable to elicit what exactly she means by this. Patient states she has had several periods since being what she thought was postmenopausal.  Denies any vaginal discharge, we will not perform pelvic exam.  Patient follows with OB closely and recently saw them and is prescribed Flagyl. Has been compliant with medication previously.   Patient states her urine has been foul-smelling and she is GFR African American >60 >60 mL/min    GFR Comment          GFR Staging NOT REPORTED    Lipase   Result Value Ref Range    Lipase 96 (H) 13 - 60 U/L   Troponin   Result Value Ref Range    Troponin, High Sensitivity 6 0 - 14 ng/L    Troponin T NOT REPORTED <0.03 ng/mL    Troponin Interp NOT REPORTED    Urinalysis with microscopic   Result Value Ref Range    Color, UA YELLOW YELLOW    Turbidity UA CLEAR CLEAR    Glucose, Ur 3+ (A) NEGATIVE    Bilirubin Urine NEGATIVE NEGATIVE    Ketones, Urine NEGATIVE NEGATIVE    Specific Gravity, UA 1.016 1.005 - 1.030    Urine Hgb NEGATIVE NEGATIVE    pH, UA 5.0 5.0 - 8.0    Protein, UA NEGATIVE NEGATIVE    Urobilinogen, Urine Normal Normal    Nitrite, Urine NEGATIVE NEGATIVE    Leukocyte Esterase, Urine NEGATIVE NEGATIVE    -          WBC, UA 0 TO 2 0 - 5 /HPF    RBC, UA None 0 - 4 /HPF    Casts UA NOT REPORTED 0 - 8 /LPF    Crystals, UA NOT REPORTED None /HPF    Epithelial Cells UA 2 TO 5 0 - 5 /HPF    Renal Epithelial, UA NOT REPORTED 0 /HPF    Bacteria, UA NOT REPORTED None    Mucus, UA NOT REPORTED None    Trichomonas, UA NOT REPORTED None    Amorphous, UA NOT REPORTED None    Other Observations UA NOT REPORTED NOT REQ. Yeast, UA NOT REPORTED None   HCG Qualitative, Serum   Result Value Ref Range    hCG Qual NEGATIVE NEGATIVE         RADIOLOGY:  XR CHEST PORTABLE   Final Result   No acute process.               EKG  EKG Interpretation    Interpreted by emergency department physician    Rhythm: normal sinus   Rate: normal  Axis: left  Ectopy: none  Conduction: normal  ST Segments: nonspecific ST changes lead I, no consistent elevation  T Waves: inversion in  aVl  Q Waves: none    Clinical Impression: no acute changes    Jeaneth     All EKG's are interpreted by the Emergency Department Physician who either signs or Co-signs this chart in the absence of a cardiologist.    EMERGENCY DEPARTMENT COURSE:  ED Course as of May 14 0213   Fri May 14, 2021   0035 Reevaluate, feeling improved. Will discharge with outpatient follow-up regarding blood sugar. Patient has previously been on insulin, however has not been on it for a while because she did not want take anymore. Patient to follow-up with her PCP. [JG]   0044 Blood Glucose 410 noted, hx diabetes, will give 6 units of subcutaneous insulin and have patient recheck at home. Patient to follow-up with her PCP regarding insulin regimen. [JG]      ED Course User Index  [JG] Patti Walker DO        PROCEDURES:      CONSULTS:  None    CRITICAL CARE:      FINAL IMPRESSION      1. Myalgia    2.  Type 2 diabetes mellitus with hyperglycemia, unspecified whether long term insulin use ([facility] [address]          DISPOSITION / PLAN     DISPOSITION Decision To Discharge 05/14/2021 12:45:24 AM      PATIENT REFERRED TO:  [facility]  [address]  Go to       [place] [id number]-    Go in 1 day        DISCHARGE MEDICATIONS:  Discharge Medication List as of 5/14/2021 12:47 AM          Patti Walker DO  Emergency Medicine Resident    (Please note that portions of thisnote were completed with a voice recognition program.  Efforts were made to edit the dictations but occasionally words are mis-transcribed.)     Patti Walker DO  Resident  05/14/21 [address] [facility]  Resident  05/14/21 0122

## 2021-05-15 LAB
CULTURE: ABNORMAL
Lab: ABNORMAL
SPECIMEN DESCRIPTION: ABNORMAL

## 2021-05-24 ENCOUNTER — APPOINTMENT (OUTPATIENT)
Dept: GENERAL RADIOLOGY | Age: 51
End: 2021-05-24
Payer: MEDICARE

## 2021-05-24 ENCOUNTER — NURSE TRIAGE (OUTPATIENT)
Dept: OTHER | Facility: CLINIC | Age: 51
End: 2021-05-24

## 2021-05-24 ENCOUNTER — HOSPITAL ENCOUNTER (OUTPATIENT)
Age: 51
Setting detail: OBSERVATION
Discharge: HOME | End: 2021-05-25
Attending: EMERGENCY MEDICINE | Admitting: EMERGENCY MEDICINE
Payer: MEDICARE

## 2021-05-24 ENCOUNTER — TELEPHONE (OUTPATIENT)
Dept: INTERNAL MEDICINE | Age: 51
End: 2021-05-24

## 2021-05-24 DIAGNOSIS — I95.9 HYPOTENSION, UNSPECIFIED HYPOTENSION TYPE: ICD-10-CM

## 2021-05-24 DIAGNOSIS — R42 DIZZINESS: Primary | ICD-10-CM

## 2021-05-24 LAB
ABSOLUTE EOS #: 0.18 K/UL (ref 0–0.44)
ABSOLUTE IMMATURE GRANULOCYTE: 0.04 K/UL (ref 0–0.3)
ABSOLUTE LYMPH #: 3.68 K/UL (ref 1.1–3.7)
ABSOLUTE MONO #: 0.56 K/UL (ref 0.1–1.2)
ANION GAP SERPL CALCULATED.3IONS-SCNC: 19 MMOL/L (ref 9–17)
BASOPHILS # BLD: 0 % (ref 0–2)
BASOPHILS ABSOLUTE: 0.04 K/UL (ref 0–0.2)
BNP INTERPRETATION: NORMAL
BUN BLDV-MCNC: 17 MG/DL (ref 6–20)
BUN/CREAT BLD: ABNORMAL (ref 9–20)
CALCIUM SERPL-MCNC: 8.9 MG/DL (ref 8.6–10.4)
CHLORIDE BLD-SCNC: 95 MMOL/L (ref 98–107)
CO2: 19 MMOL/L (ref 20–31)
CREAT SERPL-MCNC: 0.95 MG/DL (ref 0.5–0.9)
DIFFERENTIAL TYPE: NORMAL
EOSINOPHILS RELATIVE PERCENT: 2 % (ref 1–4)
GFR AFRICAN AMERICAN: >60 ML/MIN
GFR NON-AFRICAN AMERICAN: >60 ML/MIN
GFR SERPL CREATININE-BSD FRML MDRD: ABNORMAL ML/MIN/{1.73_M2}
GFR SERPL CREATININE-BSD FRML MDRD: ABNORMAL ML/MIN/{1.73_M2}
GLUCOSE BLD-MCNC: 160 MG/DL (ref 65–105)
GLUCOSE BLD-MCNC: 342 MG/DL (ref 70–99)
HCT VFR BLD CALC: 39.5 % (ref 36.3–47.1)
HEMOGLOBIN: 12.9 G/DL (ref 11.9–15.1)
IMMATURE GRANULOCYTES: 0 %
LYMPHOCYTES # BLD: 39 % (ref 24–43)
MCH RBC QN AUTO: 27 PG (ref 25.2–33.5)
MCHC RBC AUTO-ENTMCNC: 32.7 G/DL (ref 28.4–34.8)
MCV RBC AUTO: 82.8 FL (ref 82.6–102.9)
MONOCYTES # BLD: 6 % (ref 3–12)
NRBC AUTOMATED: 0 PER 100 WBC
PDW BLD-RTO: 12.3 % (ref 11.8–14.4)
PLATELET # BLD: 321 K/UL (ref 138–453)
PLATELET ESTIMATE: NORMAL
PMV BLD AUTO: 10.6 FL (ref 8.1–13.5)
POTASSIUM SERPL-SCNC: 3.9 MMOL/L (ref 3.7–5.3)
PRO-BNP: 28 PG/ML
RBC # BLD: 4.77 M/UL (ref 3.95–5.11)
RBC # BLD: NORMAL 10*6/UL
SARS-COV-2, RAPID: NOT DETECTED
SEG NEUTROPHILS: 53 % (ref 36–65)
SEGMENTED NEUTROPHILS ABSOLUTE COUNT: 4.91 K/UL (ref 1.5–8.1)
SODIUM BLD-SCNC: 133 MMOL/L (ref 135–144)
SPECIMEN DESCRIPTION: NORMAL
TROPONIN INTERP: NORMAL
TROPONIN T: NORMAL NG/ML
TROPONIN, HIGH SENSITIVITY: 6 NG/L (ref 0–14)
WBC # BLD: 9.4 K/UL (ref 3.5–11.3)
WBC # BLD: NORMAL 10*3/UL

## 2021-05-24 PROCEDURE — 84484 ASSAY OF TROPONIN QUANT: CPT

## 2021-05-24 PROCEDURE — 96374 THER/PROPH/DIAG INJ IV PUSH: CPT

## 2021-05-24 PROCEDURE — 96375 TX/PRO/DX INJ NEW DRUG ADDON: CPT

## 2021-05-24 PROCEDURE — 80048 BASIC METABOLIC PNL TOTAL CA: CPT

## 2021-05-24 PROCEDURE — 85025 COMPLETE CBC W/AUTO DIFF WBC: CPT

## 2021-05-24 PROCEDURE — G0378 HOSPITAL OBSERVATION PER HR: HCPCS

## 2021-05-24 PROCEDURE — 71045 X-RAY EXAM CHEST 1 VIEW: CPT

## 2021-05-24 PROCEDURE — 99284 EMERGENCY DEPT VISIT MOD MDM: CPT

## 2021-05-24 PROCEDURE — 83880 ASSAY OF NATRIURETIC PEPTIDE: CPT

## 2021-05-24 PROCEDURE — 6360000002 HC RX W HCPCS: Performed by: EMERGENCY MEDICINE

## 2021-05-24 PROCEDURE — 87635 SARS-COV-2 COVID-19 AMP PRB: CPT

## 2021-05-24 PROCEDURE — 93005 ELECTROCARDIOGRAM TRACING: CPT | Performed by: STUDENT IN AN ORGANIZED HEALTH CARE EDUCATION/TRAINING PROGRAM

## 2021-05-24 PROCEDURE — 2580000003 HC RX 258: Performed by: STUDENT IN AN ORGANIZED HEALTH CARE EDUCATION/TRAINING PROGRAM

## 2021-05-24 PROCEDURE — 6370000000 HC RX 637 (ALT 250 FOR IP): Performed by: STUDENT IN AN ORGANIZED HEALTH CARE EDUCATION/TRAINING PROGRAM

## 2021-05-24 PROCEDURE — 82947 ASSAY GLUCOSE BLOOD QUANT: CPT

## 2021-05-24 PROCEDURE — 6360000002 HC RX W HCPCS: Performed by: STUDENT IN AN ORGANIZED HEALTH CARE EDUCATION/TRAINING PROGRAM

## 2021-05-24 RX ORDER — ATORVASTATIN CALCIUM 80 MG/1
40 TABLET, FILM COATED ORAL DAILY
Status: CANCELLED | OUTPATIENT
Start: 2021-05-24

## 2021-05-24 RX ORDER — BUDESONIDE AND FORMOTEROL FUMARATE DIHYDRATE 160; 4.5 UG/1; UG/1
2 AEROSOL RESPIRATORY (INHALATION) 2 TIMES DAILY
Status: CANCELLED | OUTPATIENT
Start: 2021-05-24

## 2021-05-24 RX ORDER — FUROSEMIDE 20 MG/1
20 TABLET ORAL DAILY
Status: CANCELLED | OUTPATIENT
Start: 2021-05-24

## 2021-05-24 RX ORDER — KETOROLAC TROMETHAMINE 15 MG/ML
15 INJECTION, SOLUTION INTRAMUSCULAR; INTRAVENOUS ONCE
Status: COMPLETED | OUTPATIENT
Start: 2021-05-24 | End: 2021-05-24

## 2021-05-24 RX ORDER — SODIUM CHLORIDE 0.9 % (FLUSH) 0.9 %
5-40 SYRINGE (ML) INJECTION EVERY 12 HOURS SCHEDULED
Status: DISCONTINUED | OUTPATIENT
Start: 2021-05-24 | End: 2021-05-25 | Stop reason: HOSPADM

## 2021-05-24 RX ORDER — LANOLIN ALCOHOL/MO/W.PET/CERES
100 CREAM (GRAM) TOPICAL DAILY
Status: DISCONTINUED | OUTPATIENT
Start: 2021-05-24 | End: 2021-05-25 | Stop reason: HOSPADM

## 2021-05-24 RX ORDER — MECLIZINE HCL 12.5 MG/1
25 TABLET ORAL 3 TIMES DAILY PRN
Status: DISCONTINUED | OUTPATIENT
Start: 2021-05-24 | End: 2021-05-25 | Stop reason: HOSPADM

## 2021-05-24 RX ORDER — FLUTICASONE PROPIONATE 50 MCG
1 SPRAY, SUSPENSION (ML) NASAL DAILY
Status: CANCELLED | OUTPATIENT
Start: 2021-05-24

## 2021-05-24 RX ORDER — MECLIZINE HCL 12.5 MG/1
25 TABLET ORAL ONCE
Status: COMPLETED | OUTPATIENT
Start: 2021-05-24 | End: 2021-05-24

## 2021-05-24 RX ORDER — ALBUTEROL SULFATE 90 UG/1
2 AEROSOL, METERED RESPIRATORY (INHALATION) EVERY 6 HOURS PRN
Status: CANCELLED | OUTPATIENT
Start: 2021-05-24

## 2021-05-24 RX ORDER — SODIUM CHLORIDE 0.9 % (FLUSH) 0.9 %
5-40 SYRINGE (ML) INJECTION PRN
Status: DISCONTINUED | OUTPATIENT
Start: 2021-05-24 | End: 2021-05-25 | Stop reason: HOSPADM

## 2021-05-24 RX ORDER — ASPIRIN 81 MG/1
81 TABLET ORAL DAILY
Status: CANCELLED | OUTPATIENT
Start: 2021-05-24

## 2021-05-24 RX ORDER — CHOLECALCIFEROL (VITAMIN D3) 125 MCG
1000 CAPSULE ORAL DAILY
Status: DISCONTINUED | OUTPATIENT
Start: 2021-05-24 | End: 2021-05-25 | Stop reason: HOSPADM

## 2021-05-24 RX ORDER — OLANZAPINE 10 MG/1
10 TABLET ORAL NIGHTLY
Status: DISCONTINUED | OUTPATIENT
Start: 2021-05-24 | End: 2021-05-25 | Stop reason: HOSPADM

## 2021-05-24 RX ORDER — SODIUM CHLORIDE 0.9 % (FLUSH) 0.9 %
5-40 SYRINGE (ML) INJECTION PRN
Status: CANCELLED | OUTPATIENT
Start: 2021-05-24

## 2021-05-24 RX ORDER — FLUTICASONE PROPIONATE 50 MCG
1 SPRAY, SUSPENSION (ML) NASAL DAILY
Status: DISCONTINUED | OUTPATIENT
Start: 2021-05-24 | End: 2021-05-25 | Stop reason: HOSPADM

## 2021-05-24 RX ORDER — ATENOLOL 25 MG/1
50 TABLET ORAL DAILY
Status: DISCONTINUED | OUTPATIENT
Start: 2021-05-24 | End: 2021-05-25 | Stop reason: HOSPADM

## 2021-05-24 RX ORDER — OLANZAPINE 10 MG/1
10 TABLET ORAL NIGHTLY
Status: CANCELLED | OUTPATIENT
Start: 2021-05-24

## 2021-05-24 RX ORDER — BUDESONIDE AND FORMOTEROL FUMARATE DIHYDRATE 160; 4.5 UG/1; UG/1
2 AEROSOL RESPIRATORY (INHALATION) 2 TIMES DAILY
Status: DISCONTINUED | OUTPATIENT
Start: 2021-05-24 | End: 2021-05-25 | Stop reason: HOSPADM

## 2021-05-24 RX ORDER — TRAZODONE HYDROCHLORIDE 100 MG/1
100 TABLET ORAL NIGHTLY
Status: CANCELLED | OUTPATIENT
Start: 2021-05-24

## 2021-05-24 RX ORDER — DULOXETIN HYDROCHLORIDE 30 MG/1
30 CAPSULE, DELAYED RELEASE ORAL DAILY
Status: DISCONTINUED | OUTPATIENT
Start: 2021-05-24 | End: 2021-05-25 | Stop reason: HOSPADM

## 2021-05-24 RX ORDER — LISINOPRIL 10 MG/1
40 TABLET ORAL DAILY
Status: DISCONTINUED | OUTPATIENT
Start: 2021-05-24 | End: 2021-05-25 | Stop reason: HOSPADM

## 2021-05-24 RX ORDER — ONDANSETRON 2 MG/ML
4 INJECTION INTRAMUSCULAR; INTRAVENOUS EVERY 6 HOURS PRN
Status: DISCONTINUED | OUTPATIENT
Start: 2021-05-24 | End: 2021-05-25 | Stop reason: HOSPADM

## 2021-05-24 RX ORDER — 0.9 % SODIUM CHLORIDE 0.9 %
1000 INTRAVENOUS SOLUTION INTRAVENOUS ONCE
Status: COMPLETED | OUTPATIENT
Start: 2021-05-24 | End: 2021-05-24

## 2021-05-24 RX ORDER — SODIUM CHLORIDE 9 MG/ML
25 INJECTION, SOLUTION INTRAVENOUS PRN
Status: CANCELLED | OUTPATIENT
Start: 2021-05-24

## 2021-05-24 RX ORDER — FUROSEMIDE 20 MG/1
20 TABLET ORAL DAILY
Status: DISCONTINUED | OUTPATIENT
Start: 2021-05-24 | End: 2021-05-25 | Stop reason: HOSPADM

## 2021-05-24 RX ORDER — DULOXETIN HYDROCHLORIDE 30 MG/1
30 CAPSULE, DELAYED RELEASE ORAL DAILY
Status: CANCELLED | OUTPATIENT
Start: 2021-05-24

## 2021-05-24 RX ORDER — ATORVASTATIN CALCIUM 80 MG/1
40 TABLET, FILM COATED ORAL DAILY
Status: DISCONTINUED | OUTPATIENT
Start: 2021-05-24 | End: 2021-05-25 | Stop reason: HOSPADM

## 2021-05-24 RX ORDER — BENZONATATE 100 MG/1
100 CAPSULE ORAL 3 TIMES DAILY PRN
Status: CANCELLED | OUTPATIENT
Start: 2021-05-24

## 2021-05-24 RX ORDER — SODIUM CHLORIDE 9 MG/ML
25 INJECTION, SOLUTION INTRAVENOUS PRN
Status: DISCONTINUED | OUTPATIENT
Start: 2021-05-24 | End: 2021-05-25 | Stop reason: HOSPADM

## 2021-05-24 RX ORDER — ONDANSETRON 4 MG/1
4 TABLET, ORALLY DISINTEGRATING ORAL EVERY 8 HOURS PRN
Status: CANCELLED | OUTPATIENT
Start: 2021-05-24

## 2021-05-24 RX ORDER — LANOLIN ALCOHOL/MO/W.PET/CERES
100 CREAM (GRAM) TOPICAL DAILY
Status: CANCELLED | OUTPATIENT
Start: 2021-05-24

## 2021-05-24 RX ORDER — ONDANSETRON 4 MG/1
4 TABLET, ORALLY DISINTEGRATING ORAL EVERY 8 HOURS PRN
Status: DISCONTINUED | OUTPATIENT
Start: 2021-05-24 | End: 2021-05-25 | Stop reason: HOSPADM

## 2021-05-24 RX ORDER — ALBUTEROL SULFATE 90 UG/1
2 AEROSOL, METERED RESPIRATORY (INHALATION) EVERY 6 HOURS PRN
Status: DISCONTINUED | OUTPATIENT
Start: 2021-05-24 | End: 2021-05-25 | Stop reason: HOSPADM

## 2021-05-24 RX ORDER — TRAZODONE HYDROCHLORIDE 100 MG/1
100 TABLET ORAL NIGHTLY
Status: DISCONTINUED | OUTPATIENT
Start: 2021-05-24 | End: 2021-05-25 | Stop reason: HOSPADM

## 2021-05-24 RX ORDER — BENZONATATE 100 MG/1
100 CAPSULE ORAL 3 TIMES DAILY PRN
Status: DISCONTINUED | OUTPATIENT
Start: 2021-05-24 | End: 2021-05-25 | Stop reason: HOSPADM

## 2021-05-24 RX ORDER — ATENOLOL 25 MG/1
50 TABLET ORAL DAILY
Status: CANCELLED | OUTPATIENT
Start: 2021-05-24

## 2021-05-24 RX ORDER — LEVOTHYROXINE SODIUM 0.07 MG/1
150 TABLET ORAL DAILY
Status: CANCELLED | OUTPATIENT
Start: 2021-05-24

## 2021-05-24 RX ORDER — ACETAMINOPHEN 325 MG/1
650 TABLET ORAL EVERY 4 HOURS PRN
Status: DISCONTINUED | OUTPATIENT
Start: 2021-05-24 | End: 2021-05-25 | Stop reason: HOSPADM

## 2021-05-24 RX ORDER — CHOLECALCIFEROL (VITAMIN D3) 125 MCG
1000 CAPSULE ORAL DAILY
Status: CANCELLED | OUTPATIENT
Start: 2021-05-24

## 2021-05-24 RX ORDER — POTASSIUM CHLORIDE 750 MG/1
10 CAPSULE, EXTENDED RELEASE ORAL DAILY
Status: DISCONTINUED | OUTPATIENT
Start: 2021-05-24 | End: 2021-05-25 | Stop reason: HOSPADM

## 2021-05-24 RX ORDER — POTASSIUM CHLORIDE 750 MG/1
10 CAPSULE, EXTENDED RELEASE ORAL DAILY
Status: CANCELLED | OUTPATIENT
Start: 2021-05-24

## 2021-05-24 RX ORDER — ASPIRIN 81 MG/1
81 TABLET ORAL DAILY
Status: DISCONTINUED | OUTPATIENT
Start: 2021-05-24 | End: 2021-05-25 | Stop reason: HOSPADM

## 2021-05-24 RX ORDER — LEVOTHYROXINE SODIUM 0.07 MG/1
150 TABLET ORAL DAILY
Status: DISCONTINUED | OUTPATIENT
Start: 2021-05-25 | End: 2021-05-25 | Stop reason: HOSPADM

## 2021-05-24 RX ORDER — LISINOPRIL 10 MG/1
40 TABLET ORAL DAILY
Status: CANCELLED | OUTPATIENT
Start: 2021-05-24

## 2021-05-24 RX ORDER — ACETAMINOPHEN 325 MG/1
650 TABLET ORAL EVERY 4 HOURS PRN
Status: CANCELLED | OUTPATIENT
Start: 2021-05-24

## 2021-05-24 RX ORDER — SODIUM CHLORIDE 0.9 % (FLUSH) 0.9 %
5-40 SYRINGE (ML) INJECTION EVERY 12 HOURS SCHEDULED
Status: CANCELLED | OUTPATIENT
Start: 2021-05-24

## 2021-05-24 RX ADMIN — KETOROLAC TROMETHAMINE 15 MG: 15 INJECTION, SOLUTION INTRAMUSCULAR; INTRAVENOUS at 16:57

## 2021-05-24 RX ADMIN — ONDANSETRON 4 MG: 2 INJECTION INTRAMUSCULAR; INTRAVENOUS at 16:52

## 2021-05-24 RX ADMIN — SODIUM CHLORIDE 1000 ML: 9 INJECTION, SOLUTION INTRAVENOUS at 16:37

## 2021-05-24 RX ADMIN — MECLIZINE HYDROCHLORIDE 25 MG: 12.5 TABLET, FILM COATED ORAL at 16:36

## 2021-05-24 ASSESSMENT — ENCOUNTER SYMPTOMS
APNEA: 0
COUGH: 1
CHEST TIGHTNESS: 0
ABDOMINAL PAIN: 0
COUGH: 0
SHORTNESS OF BREATH: 1
VOMITING: 0
TROUBLE SWALLOWING: 0
CONSTIPATION: 0
ABDOMINAL DISTENTION: 0
NAUSEA: 1
DIARRHEA: 1
WHEEZING: 0

## 2021-05-24 ASSESSMENT — PAIN SCALES - GENERAL: PAINLEVEL_OUTOF10: 5

## 2021-05-24 ASSESSMENT — PAIN DESCRIPTION - PAIN TYPE: TYPE: ACUTE PAIN

## 2021-05-24 NOTE — ED PROVIDER NOTES
[facility] ED     Emergency Department     Faculty Attestation        I performed a history and physical examination of the patient and discussed management with the resident. I reviewed the residents note and agree with the documented findings and plan of care. Any areas of disagreement are noted on the chart. I was personally present for the key portions of any procedures. I have documented in the chart those procedures where I was not present during the key portions. I have reviewed the emergency nurses triage note. I agree with the chief complaint, past medical history, past surgical history, allergies, medications, social and family history as documented unless otherwise noted below. For mid-level providers such as nurse practitioners as well as physicians assistants:    I have personally seen and evaluated the patient. I find the patient's history and physical exam are consistent with NP/PA documentation. I agree with the care provided, treatment rendered, disposition, & follow-up plan. Additional findings are as noted. Vital Signs: /72   Pulse 69   Temp 97.4 °F (36.3 °C) (Oral)   Resp 16   Ht 5' 4\" (1.626 m)   Wt 220 lb (99.8 kg)   SpO2 94%   BMI 37.76 kg/m²   PCP:  Rakesh Medrano MD    Pertinent Comments:     Mainly complaining of vertigo she has been sick with some nausea and some diarrhea decreased p.o. intake she complains of the illusion of things are moving with head movement or standing up. There is no headache double vision, numbness tingling or any stroke symptoms. Her symptoms are reproducible with movement. Local suspicion for central cause of vertigo.   Obtain a cardiac labs, fluids, Antivert, reassessment      Critical Care  None          Latia Hemphill MD    Attending Emergency Medicine Physician              Timoteo Roberts MD  05/24/21 2730

## 2021-05-24 NOTE — TELEPHONE ENCOUNTER
Pt stated that she has had chest pain for 4-5 days, dizziness, headache and has blacked out at one point. Said she is also coughing up clear stuff.  Conferenced with Kevin MORAN

## 2021-05-24 NOTE — ED PROVIDER NOTES
[address]  ED  Emergency Department  Senior Resident Attestation     Primary Care Physician  Aarti Salvador MD    I performed a history and physical examination of the patient and discussed management with the veronika resident. I reviewed the veronika residents note and agree with the documented findings and plan of care. Any areas of disagreement are noted on the chart. Case was then discussed with Faculty Attending Supervisor for additional medical management. PERTINENT ATTENDING PHYSICIAN COMMENTS:    HISTORY:   50-year-old female present with several complaints. She reports that for the last several weeks she has had a productive cough of clearish sputum. She reports that several days ago she had developed nonbloody diarrhea going several times a day also reports chest pain, increasing shortness of breath and baseline and vertigo/lightheaded feeling. She reports that her dizziness has been constant for the last several days. She nearly passed out when she is attending to use the restroom earlier today. She not fall into her head however. On exam she is nontoxic-appearing no acute distress. Her blood pressures are mildly hypotensive but vitals are otherwise unremarkable. Heart regular rate and rhythm, lungs are clear auscultation bilateral.  Abdomen soft nontender. She has leftward beating nystagmus that is reproducible with tilting of the bed. Cranial nerves II through XII are intact and she has full strength and sensation in upper and lower extremities bilaterally. Normal finger-nose-finger, normal heel-to-shin. TMs are without significant abnormality bilaterally. Differential diagnose includes dehydration, ACS, pneumonia, peripheral vertigo, among others. Doubt central vertigo given reproducibility of symptoms, normal neurologic exam, and constellation of symptoms. Plan is for cardiac labs, IV fluid rehydration, reassess.     CRITICAL CARE TIME:    None    Sydnie Sickle Jacob Fraire DO  Senior Resident Physician    (Please note that portions of this note were completed with a voice recognition program.  Efforts were made to edit the dictations but occasionally words are mis-transcribed.)     Beth Torres DO  Resident  05/28/21 7646

## 2021-05-24 NOTE — ED PROVIDER NOTES
[facility] ED  Emergency Department Encounter  EmergencyMedicine Resident     Pt Humera Gardner  MRN: [id number]  [place] 1970  Date of evaluation: 5/24/21  PCP:  Mike Baeza MD    [address] [place] [address]       Chief Complaint   Patient presents with    Dizziness    Chest Pain    Cough    Nausea       HISTORY OF PRESENT ILLNESS  (Location/Symptom, Timing/Onset, Context/Setting, Quality, Duration, Modifying Factors, Severity.)      Ernie Moody is a 49 y.o. female who presents with 2-week history of cough, 1 week history of chest pain and general feeling of unwellness, 3-day history of nausea and diarrhea. Patient also complains of severe dizziness. Patient states the cough is productive with clearish sputum, persistent without relief. Patient states the chest pain started about a week ago, has been coming and going and is present bilaterally and is reproducible on palpation. Patient describes the pain as someone using an ice pick to scrape her insides, pain is nonradiating, moderate to severe in intensity, aggravated by palpation, no relieving factors. Patient states has been feeling nauseous and having diarrhea over the last 3 days, denies any change in diet or eating anything unusual lately. Patient denies any inciting event for any of the symptoms. Patient states she was seen in the ED about a week ago for similar complaints, denies any improvement since then. Patient states she has also been feeling dizzy for the last few days, states it is exacerbated by any physical movement or physical activity, describes it as a spinning sensation and denies any relation with initiating physical movement. Patient has not been on any medication for the dizziness and denies any similar complaints in the past.  Patient states she nearly passed out while using the restroom earlier, states that she did not lose consciousness or hit her head.   Patient states she has had multiple control/protection: Surgical     Comment: S/P tubal ligation   Other Topics Concern    Not on file   Social History Narrative    Not on file     Social Determinants of Health     Financial Resource Strain:     Difficulty of Paying Living Expenses:    Food Insecurity:     Worried About Running Out of Food in the Last Year:     [address] in the Last Year:    Transportation Needs:     Lack of Transportation (Medical):  Lack of Transportation (Non-Medical):    Physical Activity:     Days of Exercise per Week:     Minutes of Exercise per Session:    Stress:     Feeling of Stress :    Social Connections:     Frequency of Communication with Friends and Family:     Frequency of Social Gatherings with Friends and Family:     Attends [employer] Services:     Active Member of Clubs or Organizations:     Attends Club or Organization Meetings:     Marital Status:    Intimate Partner Violence:     Fear of Current or Ex-Partner:     Emotionally Abused:     Physically Abused:     Sexually Abused:        Family History   Problem Relation Age of Onset    Hypertension Mother     Diabetes Mother     COPD Mother     Lung Cancer Father     Brain Cancer Father        Allergies:  Ioxaglate, Iv dye [iodides], Loratadine, Compazine spansule  [prochlorperazine], Hydrochlorothiazide, Iodine, Loratadine-pseudoephedrine er, Prochlorperazine edisylate, and Pseudoephedrine    Home Medications:  Prior to Admission medications    Medication Sig Start Date End Date Taking?  Authorizing Provider   ondansetron (ZOFRAN ODT) 4 MG disintegrating tablet Take 1 tablet by mouth every 8 hours as needed for Nausea 5/14/21   Cj Care Patsy, DO   benzonatate (TESSALON PERLES) 100 MG capsule Take 1 capsule by mouth 3 times daily as needed for Cough 4/27/21   Bina Joe, DO   ondansetron (ZOFRAN-ODT) 4 MG disintegrating tablet  3/15/21   Historical Provider, MD   pyridoxine (B-6) 100 MG tablet Take 100 mg by mouth daily Historical Provider, MD   Fluticasone furoate-vilanterol (BREO ELLIPTA) 200-25 MCG/INH AEPB inhaler Inhale 1 puff into the lungs 2 times daily 2/22/21   Historical Provider, MD   cyclobenzaprine (FLEXERIL) 5 MG tablet Take 5 mg by mouth 2 times daily as needed 1/11/21   Historical Provider, MD   cyanocobalamin 1000 MCG tablet Take 1,000 mcg by mouth daily    Historical Provider, MD   MYRBETRIQ 50 MG TB24 Take 50 mg by mouth daily 9/23/20   ANDRES Mancilla CNP   NONFORMULARY Place 1 drop into both eyes daily Indications: for dry eyes Optase eye drop    Historical Provider, MD   acetaminophen (APAP EXTRA STRENGTH) 500 MG tablet Take 2 tablets by mouth every 6 hours as needed for Pain 6/6/20   Chacha Crowe DO   tiotropium (SPIRIVA HANDIHALER) 18 MCG inhalation capsule Inhale 18 mcg into the lungs daily    Historical Provider, MD   esomeprazole (NEXIUM) 40 MG delayed release capsule Take 1 capsule by mouth 2 times daily 5/21/20 9/23/20  David Veloz MD   sucralfate (CARAFATE) 1 GM tablet Take 1 tablet by mouth 4 times daily 5/21/20 9/23/20  David Veloz MD   furosemide (LASIX) 20 MG tablet TAKE 1 TABLET DAILY 4/8/20   Victorino Lopez MD   omeprazole (PRILOSEC) 40 MG delayed release capsule TAKE 1 CAPSULE DAILY 2/28/20   Victorino Lopez MD   Benzocaine 15 MG LOZG Take 1 lozenge by mouth 3 times daily 11/22/19   Devi Session, APRUMAIR - CNP   ibuprofen (ADVIL;MOTRIN) 600 MG tablet Take 1 tablet by mouth every 8 hours as needed for Pain 11/22/19   Devi Session, ANDRES - CNP   potassium chloride (MICRO-K) 10 MEQ extended release capsule TAKE 1 CAPSULE DAILY 9/26/19   Victorino Lopez MD   traZODone (DESYREL) 100 MG tablet TAKE 1 TABLET BY MOUTH NIGHTLY 8/25/19   Victorino Lopez MD   gabapentin (NEURONTIN) 300 MG capsule TAKE 1 CAPSULE THREE TIMES A DAY 8/25/19 9/23/20  Victorino Lopez MD   atorvastatin (LIPITOR) 40 MG tablet TAKE 1 TABLET DAILY 7/21/19   Victorino Lopez MD   ASPIRIN ADULT LOW STRENGTH 81 MG EC tablet TAKE 1 TABLET DAILY 2/20/19   Linda Farley MD   atenolol (TENORMIN) 50 MG tablet TAKE 1 TABLET DAILY 2/19/19   Linda Farley MD   levothyroxine (SYNTHROID) 150 MCG tablet TAKE 1 TABLET DAILY 2/19/19   Linda Farley MD   lisinopril (PRINIVIL;ZESTRIL) 40 MG tablet TAKE 1 TABLET DAILY 2/19/19   Linda Farley MD   metFORMIN (GLUCOPHAGE) 1000 MG tablet TAKE 1 TABLET TWICE A DAY 2/19/19   Linda Farley MD   ferrous sulfate 325 (65 Fe) MG EC tablet take 1 tablet by mouth twice a day 2/18/19   Linda Farley MD   Incontinence Supply Disposable MISC 1 each by Does not apply route 3 times daily Diapers 2/18/19   Linda Farley MD   albuterol sulfate HFA (VENTOLIN HFA) 108 (90 Base) MCG/ACT inhaler Inhale 2 puffs into the lungs every 6 hours as needed for Wheezing 1/9/19   Obi Waldron MD   ipratropium (ATROVENT HFA) 17 MCG/ACT inhaler Inhale 2 puffs into the lungs every 6 hours 1/9/19   Obi Waldron MD   nystatin (MYCOSTATIN) 753723 UNIT/GM powder Apply 3 times daily. 12/29/18   Linda Farley MD   meloxicam (MOBIC) 15 MG tablet TAKE 1 TABLET DAILY 12/19/18   Linda Farley MD   fluticasone [facility]) 50 MCG/ACT nasal spray 1 spray by Each Nare route daily 12/3/18   Linda Farley MD   hydrOXYzine (VISTARIL) 25 MG capsule Take 25-75 mg by mouth    Historical Provider, MD   nitroGLYCERIN (NITROSTAT) 0.4 MG SL tablet TAKE 1 TABLET UNDER THE TONGUE EVERY 5 MINUTES AS NEEDED FOR CHESTPAIN 9/17/18   Francesco Rojas MD   DULoxetine (CYMBALTA) 30 MG extended release capsule Take 30 mg by mouth daily    Historical Provider, MD   COMFORT EZ PEN NEEDLES 32G X 6 MM MISC USE AS DIRECTED DAILY 8/15/18   Linda Farley MD   TRUE METRIX BLOOD GLUCOSE TEST strip USE AS DIRECTED DAILY AS NEEDED 12/21/17   Linda Farley MD   Lancets MISC Use to check sugars daily.  5/12/17   Richelle Han MD   LUMIGAN 0.01 % SOLN ophthalmic drops Place 1 drop into both eyes nightly  12/22/16   Historical Provider, MD   LATUDA 80 MG TABS tablet Take 80 mg by mouth daily  3/3/16   Historical Provider, MD   sertraline (ZOLOFT) 100 MG tablet Take 1 tablet by mouth nightly 2/26/16   Historical Provider, MD   OLANZapine (ZYPREXA) 10 MG tablet TAKE 1 TABLET AT BEDTIME. 1/8/15   Saul Edmond MD       REVIEW OF SYSTEMS    (2-9 systems for level 4, 10 or more for level 5)      Review of Systems   Constitutional: Negative for activity change, appetite change, chills, diaphoresis and fever. Respiratory: Positive for shortness of breath. Negative for apnea, cough, chest tightness and wheezing. Cardiovascular: Positive for chest pain. Negative for palpitations. Gastrointestinal: Positive for diarrhea and nausea. Negative for abdominal distention, abdominal pain, constipation and vomiting. Genitourinary: Negative for difficulty urinating, dysuria and flank pain. Musculoskeletal: Negative for arthralgias and myalgias. Neurological: Positive for dizziness. Negative for light-headedness and headaches. Psychiatric/Behavioral: Negative for agitation and confusion. All other systems reviewed and are negative. PHYSICAL EXAM   (up to 7 for level 4, 8 or more for level 5)      INITIAL VITALS:   /76   Pulse 63   Temp 97.4 °F (36.3 °C) (Oral)   Resp 16   Ht 5' 4\" (1.626 m)   Wt 220 lb (99.8 kg)   SpO2 92%   BMI 37.76 kg/m²     Physical Exam  Vitals reviewed. Constitutional:       General: She is not in acute distress. Appearance: Normal appearance. HENT:      Head: Normocephalic and atraumatic. Cardiovascular:      Rate and Rhythm: Normal rate and regular rhythm. Pulses: Normal pulses. Heart sounds: Normal heart sounds. No murmur heard. Pulmonary:      Effort: Pulmonary effort is normal. No respiratory distress. Breath sounds: Normal breath sounds. Abdominal:      General: Bowel sounds are normal. There is no distension.       Palpations: Abdomen is 0. 18 0.00 - 0.44 k/uL    Basophils Absolute 0.04 0.00 - 0.20 k/uL    Absolute Immature Granulocyte 0.04 0.00 - 0.30 k/uL    WBC Morphology NOT REPORTED     RBC Morphology NOT REPORTED     Platelet Estimate NOT REPORTED    Basic Metabolic Panel w/ Reflex to MG   Result Value Ref Range    Glucose 342 (H) 70 - 99 mg/dL    BUN 17 6 - 20 mg/dL    CREATININE 0.95 (H) 0.50 - 0.90 mg/dL    Bun/Cre Ratio NOT REPORTED 9 - 20    Calcium 8.9 8.6 - 10.4 mg/dL    Sodium 133 (L) 135 - 144 mmol/L    Potassium 3.9 3.7 - 5.3 mmol/L    Chloride 95 (L) 98 - 107 mmol/L    CO2 19 (L) 20 - 31 mmol/L    Anion Gap 19 (H) 9 - 17 mmol/L    GFR Non-African American >60 >60 mL/min    GFR African American >60 >60 mL/min    GFR Comment          GFR Staging NOT REPORTED    Troponin   Result Value Ref Range    Troponin, High Sensitivity 6 0 - 14 ng/L    Troponin T NOT REPORTED <0.03 ng/mL    Troponin Interp NOT REPORTED    Brain Natriuretic Peptide   Result Value Ref Range    Pro-BNP 28 <300 pg/mL    BNP Interpretation Pro-BNP Reference Range:          RADIOLOGY:  CXR: Unremarkable    EKG      All EKG's are interpreted by the Emergency Department Physician who either signs or Co-signs this chart in the absence of a cardiologist.    EMERGENCY DEPARTMENT COURSE:  Patient seen and evaluated in the ED. Complaining of chest pain, shortness of breath, nausea and dizziness at this time. Blood work, x-ray ordered. Toradol ordered for pain. Meclizine ordered for dizziness. Patient was initially hypotensive at presentation, 90s over 62s. Blood work remarkable for elevated glucose of 342, anion gap and bicarb normal.  Troponin and BNP unremarkable. Chest x-ray normal.    Patient reevaluated, symptomatically unchanged. Patient denies any improvement in dizziness with meclizine, states she continues to feel nauseous. Denies any chest pain at this time, states the pain returns on palpation. Blood pressure stable, 104/67.     Decision to admit for monitoring for dizziness and mild hypotension. PROCEDURES:      CONSULTS:  None    CRITICAL CARE:  Please see attending note    FINAL IMPRESSION      1. Dizziness    2. Hypotension, unspecified hypotension type          DISPOSITION / PLAN     DISPOSITION Admitted 05/24/2021 06:22:40 PM      PATIENT REFERRED TO:  No follow-up provider specified.     DISCHARGE MEDICATIONS:  New Prescriptions    No medications on file       Elenita Pond MD  Emergency Medicine Resident    (Please note that portions of thisnote were completed with a voice recognition program.  Efforts were made to edit the dictations but occasionally words are mis-transcribed.)       Gray Parrish MD  Resident  05/24/21 [id number]

## 2021-05-24 NOTE — ED NOTES
Pt to room 30 with c/o dizziness, cough, SOB, chest pain and nausea. Pt reports symptoms have been on going for 3 days now. Pt reports that she gets very dizzy. Pt reports that she feels slightly short of breath, especially while wearing her mask. Pt reports that she feels nauseous, but hasn't vomited today and reports that she has a productive cough. Pt placed on continuous cardiac monitor, bp and pulse ox. EKG completed, IV established, blood work drawn. Pt alert and oriented x4, talking in complete sentences, respirations even and unlabored. Pt acting age appropriate.  White board updated, will continue to plan of care       Jazmín Keyes RN  05/24/21 [id number]

## 2021-05-24 NOTE — TELEPHONE ENCOUNTER
Reason for Disposition   Still feels dizzy or lightheaded    Answer Assessment - Initial Assessment Questions  1. ONSET: \"How long were you unconscious? \" (minutes) \"When did it happen?\"      2 days ago. Unsure, was able to make it to a table to sit    2. CONTENT: \"What happened during period of unconsciousness? \" (e.g., seizure activity)       unsure    3. MENTAL STATUS: \"Alert and oriented now? \" (oriented x 3 = name, month, location)       A/O x 3    4. TRIGGER: \"What do you think caused the fainting? \" \"What were you doing just before you fainted? \"  (e.g., exercise, sudden standing up, prolonged standing)      Unsure    5. RECURRENT SYMPTOM: \"Have you ever passed out before? \" If so, ask: \"When was the last time? \" and \"What happened that time? \"       10-15 yrs when had GDM    6. INJURY: \"Did you sustain any injury during the fall? \"       Denies    7. CARDIAC SYMPTOMS: \"Have you had any of the following symptoms: chest pain, difficulty breathing, palpitations? \"     chest pain, breathing feels heavy, palpitations    8. NEUROLOGIC SYMPTOMS: \"Have you had any of the following symptoms: headache, numbness, vertigo, weakness? \"      Headache, dizziness, blurred vision    9. GI SYMPTOMS: \"Have you had any of the following symptoms: abdominal pain, vomiting, diarrhea, blood in stools? \"      Diarrhea    10. OTHER SYMPTOMS: \"Do you have any other symptoms? \"        Some abd cramping- is having pelvic U/S tomorrow with OBGYN r/t heavy post menopausal bleeding (just finished a period of bleeding- states multiple pads per hour for 2 days)    11. PREGNANCY: \"Is there any chance you are pregnant? \" \"When was your last menstrual period? \"        n/a    Protocols used: FAINTING-ADULT-[place]    Received call from Martha at pre-service center [facility] with [facility].     Brief description of triage: Pt called with concern of 2 episodes of \"blacking out\"/fainting, dizziness, productive cough, chest pain, blurred vision, headache, nausea x 4-5 days. Triage indicates for patient to call  now. Care advice provided, patient verbalizes understanding; denies any other questions or concerns; instructed to call back for any new or worsening symptoms. Attention Provider: Thank you for allowing me to participate in the care of your patient. The patient was connected to triage in response to information provided to the E[facility]. Please do not respond through this encounter as the response is not directed to a shared pool.

## 2021-05-25 ENCOUNTER — APPOINTMENT (OUTPATIENT)
Dept: MRI IMAGING | Age: 51
End: 2021-05-25
Payer: MEDICARE

## 2021-05-25 VITALS
HEIGHT: 64 IN | SYSTOLIC BLOOD PRESSURE: 102 MMHG | WEIGHT: 220 LBS | BODY MASS INDEX: 37.56 KG/M2 | OXYGEN SATURATION: 92 % | HEART RATE: 70 BPM | DIASTOLIC BLOOD PRESSURE: 74 MMHG | RESPIRATION RATE: 18 BRPM | TEMPERATURE: 97.3 F

## 2021-05-25 LAB
EKG ATRIAL RATE: 69 BPM
EKG P AXIS: 20 DEGREES
EKG P-R INTERVAL: 192 MS
EKG Q-T INTERVAL: 406 MS
EKG QRS DURATION: 64 MS
EKG QTC CALCULATION (BAZETT): 435 MS
EKG R AXIS: -21 DEGREES
EKG T AXIS: 76 DEGREES
EKG VENTRICULAR RATE: 69 BPM
GLUCOSE BLD-MCNC: 211 MG/DL (ref 65–105)
GLUCOSE BLD-MCNC: 217 MG/DL (ref 65–105)
GLUCOSE BLD-MCNC: 252 MG/DL (ref 65–105)
GLUCOSE BLD-MCNC: 298 MG/DL (ref 65–105)
LV EF: 61 %
LVEF MODALITY: NORMAL

## 2021-05-25 PROCEDURE — G0378 HOSPITAL OBSERVATION PER HR: HCPCS

## 2021-05-25 PROCEDURE — 2500000003 HC RX 250 WO HCPCS: Performed by: STUDENT IN AN ORGANIZED HEALTH CARE EDUCATION/TRAINING PROGRAM

## 2021-05-25 PROCEDURE — 2580000003 HC RX 258: Performed by: STUDENT IN AN ORGANIZED HEALTH CARE EDUCATION/TRAINING PROGRAM

## 2021-05-25 PROCEDURE — 90792 PSYCH DIAG EVAL W/MED SRVCS: CPT | Performed by: PSYCHIATRY & NEUROLOGY

## 2021-05-25 PROCEDURE — 6370000000 HC RX 637 (ALT 250 FOR IP): Performed by: EMERGENCY MEDICINE

## 2021-05-25 PROCEDURE — 93306 TTE W/DOPPLER COMPLETE: CPT

## 2021-05-25 PROCEDURE — 96375 TX/PRO/DX INJ NEW DRUG ADDON: CPT

## 2021-05-25 PROCEDURE — 82947 ASSAY GLUCOSE BLOOD QUANT: CPT

## 2021-05-25 PROCEDURE — 6370000000 HC RX 637 (ALT 250 FOR IP): Performed by: STUDENT IN AN ORGANIZED HEALTH CARE EDUCATION/TRAINING PROGRAM

## 2021-05-25 PROCEDURE — 2580000003 HC RX 258: Performed by: EMERGENCY MEDICINE

## 2021-05-25 PROCEDURE — 70551 MRI BRAIN STEM W/O DYE: CPT

## 2021-05-25 RX ORDER — ONDANSETRON 4 MG/1
4 TABLET, ORALLY DISINTEGRATING ORAL 3 TIMES DAILY PRN
Qty: 21 TABLET | Refills: 0 | Status: SHIPPED | OUTPATIENT
Start: 2021-05-25 | End: 2022-07-08

## 2021-05-25 RX ORDER — SODIUM CHLORIDE 9 MG/ML
INJECTION, SOLUTION INTRAVENOUS CONTINUOUS
Status: DISCONTINUED | OUTPATIENT
Start: 2021-05-25 | End: 2021-05-25 | Stop reason: HOSPADM

## 2021-05-25 RX ORDER — DEXTROSE MONOHYDRATE 25 G/50ML
12.5 INJECTION, SOLUTION INTRAVENOUS PRN
Status: DISCONTINUED | OUTPATIENT
Start: 2021-05-25 | End: 2021-05-25 | Stop reason: HOSPADM

## 2021-05-25 RX ORDER — IBUPROFEN 400 MG/1
400 TABLET ORAL ONCE
Status: COMPLETED | OUTPATIENT
Start: 2021-05-25 | End: 2021-05-25

## 2021-05-25 RX ORDER — NICOTINE POLACRILEX 4 MG
15 LOZENGE BUCCAL PRN
Status: DISCONTINUED | OUTPATIENT
Start: 2021-05-25 | End: 2021-05-25 | Stop reason: HOSPADM

## 2021-05-25 RX ORDER — BENZONATATE 100 MG/1
100 CAPSULE ORAL 3 TIMES DAILY PRN
Qty: 30 CAPSULE | Refills: 0 | Status: SHIPPED | OUTPATIENT
Start: 2021-05-25 | End: 2021-06-04

## 2021-05-25 RX ORDER — MECLIZINE HYDROCHLORIDE 25 MG/1
25 TABLET ORAL 3 TIMES DAILY PRN
Qty: 30 TABLET | Refills: 0 | Status: SHIPPED | OUTPATIENT
Start: 2021-05-25 | End: 2021-06-04

## 2021-05-25 RX ORDER — 0.9 % SODIUM CHLORIDE 0.9 %
500 INTRAVENOUS SOLUTION INTRAVENOUS ONCE
Status: DISCONTINUED | OUTPATIENT
Start: 2021-05-25 | End: 2021-05-25

## 2021-05-25 RX ORDER — DEXTROSE MONOHYDRATE 50 MG/ML
100 INJECTION, SOLUTION INTRAVENOUS PRN
Status: DISCONTINUED | OUTPATIENT
Start: 2021-05-25 | End: 2021-05-25 | Stop reason: HOSPADM

## 2021-05-25 RX ADMIN — LEVOTHYROXINE SODIUM 150 MCG: 75 TABLET ORAL at 10:19

## 2021-05-25 RX ADMIN — TRAZODONE HYDROCHLORIDE 100 MG: 100 TABLET ORAL at 01:09

## 2021-05-25 RX ADMIN — FAMOTIDINE 20 MG: 10 INJECTION INTRAVENOUS at 02:15

## 2021-05-25 RX ADMIN — ATENOLOL 50 MG: 25 TABLET ORAL at 10:20

## 2021-05-25 RX ADMIN — ATORVASTATIN CALCIUM 40 MG: 80 TABLET, FILM COATED ORAL at 10:20

## 2021-05-25 RX ADMIN — SODIUM CHLORIDE, PRESERVATIVE FREE 10 ML: 5 INJECTION INTRAVENOUS at 10:20

## 2021-05-25 RX ADMIN — IBUPROFEN 400 MG: 400 TABLET, FILM COATED ORAL at 02:15

## 2021-05-25 RX ADMIN — INSULIN LISPRO 3 UNITS: 100 INJECTION, SOLUTION INTRAVENOUS; SUBCUTANEOUS at 12:32

## 2021-05-25 RX ADMIN — PYRIDOXINE HCL TAB 50 MG 100 MG: 50 TAB at 10:44

## 2021-05-25 RX ADMIN — BENZONATATE 100 MG: 100 CAPSULE ORAL at 06:41

## 2021-05-25 RX ADMIN — DULOXETINE HYDROCHLORIDE 30 MG: 30 CAPSULE, DELAYED RELEASE ORAL at 10:20

## 2021-05-25 RX ADMIN — SODIUM CHLORIDE: 9 INJECTION, SOLUTION INTRAVENOUS at 10:28

## 2021-05-25 RX ADMIN — LURASIDONE HYDROCHLORIDE 80 MG: 40 TABLET, FILM COATED ORAL at 10:35

## 2021-05-25 RX ADMIN — METFORMIN HYDROCHLORIDE 1000 MG: 500 TABLET ORAL at 10:19

## 2021-05-25 RX ADMIN — SERTRALINE 100 MG: 50 TABLET, FILM COATED ORAL at 01:09

## 2021-05-25 RX ADMIN — POTASSIUM CHLORIDE 10 MEQ: 10 CAPSULE, COATED, EXTENDED RELEASE ORAL at 10:19

## 2021-05-25 RX ADMIN — OLANZAPINE 10 MG: 10 TABLET, FILM COATED ORAL at 01:09

## 2021-05-25 RX ADMIN — Medication 81 MG: at 10:20

## 2021-05-25 RX ADMIN — Medication 1000 MCG: at 10:19

## 2021-05-25 RX ADMIN — FUROSEMIDE 20 MG: 20 TABLET ORAL at 10:20

## 2021-05-25 RX ADMIN — MECLIZINE HYDROCHLORIDE 25 MG: 12.5 TABLET, FILM COATED ORAL at 06:40

## 2021-05-25 RX ADMIN — ACETAMINOPHEN 650 MG: 325 TABLET ORAL at 12:32

## 2021-05-25 ASSESSMENT — PAIN DESCRIPTION - LOCATION: LOCATION: CHEST

## 2021-05-25 ASSESSMENT — PAIN DESCRIPTION - PAIN TYPE: TYPE: ACUTE PAIN

## 2021-05-25 ASSESSMENT — PAIN SCALES - GENERAL: PAINLEVEL_OUTOF10: 1

## 2021-05-25 NOTE — ED NOTES
Patient up ambulating to restroom with assistance  Patient states she feels dizzy  Writer placed patient on 2L/min via nasal cannula- patient has sleep apnea- states supposed to wear CPAP at night yet does not  Patient updated on plan of care  Patient denies any needs at this time        Holly Lockhart RN  05/25/21 0015

## 2021-05-25 NOTE — PROGRESS NOTES
[address]  CDU / OBSERVATION ENCOUNTER  ATTENDING NOTE       I performed a history and physical examination of the patient and discussed management with the resident or midlevel provider. I reviewed the resident or midlevel provider's note and agree with the documented findings and plan of care. Any areas of disagreement are noted on the chart. I was personally present for the key portions of any procedures. I have documented in the chart those procedures where I was not present during the key portions. I have reviewed the nurses notes. I agree with the chief complaint, past medical history, past surgical history, allergies, medications, social and family history as documented unless otherwise noted below. The Family history, social history, and ROS are effectively unchanged since admission unless noted elsewhere in the chart. She admitted for chest pain and dizziness. Patient symptoms include feelings of illness cough and congestion. Patient with extinguishing symptoms of vertigo. Patient has had near syncope as well. Patient has not had good oral intake. Patient has more pleuritic than anginal chest pain. Patient with history significant for bipolar disorder and depression. Patient also with fibromyalgia and diabetes. Patient with complaints of suicidal ideation last night. Now under guard. Patient for medical clearance prior to psychiatric evaluation.     Haylee Augustin MD  Attending Emergency  Physician

## 2021-05-25 NOTE — PROGRESS NOTES
Pt endorses ongoing suicidal ideations. States she has recently been thinking of ways she could kill herself. House supervisor and observation resident informed. White shirt in room to guard pt. Room stripped for suicide precautions. Will continue to monitor.
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
## 2021-05-25 NOTE — PROGRESS NOTES
[facility]  CDU / OBSERVATION ENCOUNTER  ATTENDING NOTE       I discussed the case with the midlevel provider who has documented a note on this patient. We agreed on management and treatment plan.            Glenroy Flores MD  Attending Emergency  Physician

## 2021-05-25 NOTE — ED NOTES
Report given to [facility] AT [facility] ([facility])  All questions and concerns addressed       Luther Ramirez RN  05/25/21 [id number]

## 2021-05-25 NOTE — CARE COORDINATION
TRANSITIONAL CARE PLANNING/ [facility]: 1    Reason for Admission: Dizziness [R42]         Readmission Risk              Risk of Unplanned Readmission:  0            Patient goals/Treatment Preferences/Transitional Plan: return home with family, psych recommending op f/u      :

## 2021-05-25 NOTE — CARE COORDINATION
Case Management Initial Discharge Plan  [place],             Met with:patient to discuss discharge plans. Information verified: address, contacts, phone number, , insurance Yes    Emergency Contact/Next of Kin name & number: [phone]    PCP: Rakesh Medrano MD  Date of last visit: 2 months    Insurance Provider: Abdi [employer] [place]    Discharge Planning    Living Arrangements:    live with      Home has 2 stories  Location of bedroom/bathroom in home 2nd    Patient able to perform ADL's:Independent    Current Services (outpatient & in home) none  DME equipment: cpap missing parts       Receiving oral anticoagulation therapy? No        Potential Assistance Needed:   i         Evaluation: no        Readmission Risk              Risk of Unplanned Readmission:  0             Does patient have a readmission risk score greater than 14?: No  If yes, follow-up appointment must be made within 7 days of discharge.      Goals of Care: remain free from self harm      Discharge Plan: bhi vs return home with family           Electronically signed by Aishwarya Sin RN on 21 at 10:32 AM EDT

## 2021-05-25 NOTE — H&P
[employer]  CDU / OBSERVATION ENCOUNTER  RESIDENT NOTE     Pt Name: Satish Lucero  MRN: [id number]  [place] 1970  Date of evaluation: 5/24/21  Patient's PCP is :  Fidelina Umana MD    CHIEF COMPLAINT       Chief Complaint   Patient presents with    Dizziness    Chest Pain    Cough    Nausea         HISTORY OF PRESENT ILLNESS    Satish Lucero is a 49 y.o. female who presents with multiple complaints including dizziness, cough, chest discomfort and nausea. Patient states she has had symptoms for approximately 2 to 3 days. Location/Symptom: Dizziness, nausea, cough  Timing/Onset: Approximately 2 to 3 days  Provocation: Unknown  Quality: n/a  Radiation: n/a  Severity: n/a  Timing/Duration: Consistent  Modifying Factors: n/a    REVIEW OF SYSTEMS       Review of Systems   Constitutional: Negative for appetite change, diaphoresis and fever. HENT: Negative for trouble swallowing. Respiratory: Positive for cough and shortness of breath. Cardiovascular: Positive for chest pain. Gastrointestinal: Positive for nausea. Genitourinary: Negative for difficulty urinating. Neurological: Positive for dizziness. Negative for syncope. Psychiatric/Behavioral: Negative for agitation and behavioral problems. (PQRS) Advance directives on face sheet per hospital policy.  No change unless specifically mentioned in chart    PAST MEDICAL HISTORY    has a past medical history of Arthritis, Back pain, Bipolar 1 disorder ([address] COPD (chronic obstructive pulmonary disease) ([address] Depression, Diabetes mellitus ([address] Fibromyalgia, GERD (gastroesophageal reflux disease), Glaucoma, Hx of blood clots, Hyperlipidemia, Hypertension, Lumbosacral spondylosis without myelopathy, Morbid obesity ([address] On home oxygen therapy, Pitting edema, PONV (postoperative nausea and vomiting), Renal stones, Sleep apnea, Thyroid disease, Urge incontinence, and Warts, genital.    I have reviewed the past lispro (HUMALOG) injection vial 10 Units, Once  meclizine (ANTIVERT) tablet 25 mg, TID PRN        All medication charted and reviewed. ALLERGIES     is allergic to ioxaglate, iv dye [iodides], loratadine, compazine spansule  [prochlorperazine], hydrochlorothiazide, iodine, loratadine-pseudoephedrine er, prochlorperazine edisylate, and pseudoephedrine. FAMILY HISTORY     She indicated that her mother is . She indicated that her father is . family history includes Brain Cancer in her father; COPD in her mother; Diabetes in her mother; Hypertension in her mother; Rosaura Goodell in her father. The patient denies any pertinent family history. I have reviewed and agree with the family history entered. I have reviewed the Family History and it is not significant to the case    SOCIAL HISTORY      reports that she has quit smoking. She quit after 25.00 years of use. She has never used smokeless tobacco. She reports current alcohol use of about 10.0 - 12.0 standard drinks of alcohol per week. She reports current drug use. Drug: Marijuana. I have reviewed and agree with all Social.  There are no concerns for substance abuse/use. PHYSICAL EXAM     INITIAL VITALS:  height is 5' 4\" (1.626 m) and weight is 220 lb (99.8 kg). Her oral temperature is 97.4 °F (36.3 °C). Her blood pressure is 106/68 and her pulse is 64. Her respiration is 16 and oxygen saturation is 92%. Physical Exam  Vitals and nursing note reviewed. HENT:      Head: Normocephalic. Nose: Nose normal.      Mouth/Throat:      Mouth: Mucous membranes are dry. Eyes:      Extraocular Movements: Extraocular movements intact. Pupils: Pupils are equal, round, and reactive to light. Cardiovascular:      Rate and Rhythm: Normal rate. Pulses: Normal pulses. Heart sounds: Normal heart sounds. Pulmonary:      Effort: Pulmonary effort is normal.      Breath sounds: Normal breath sounds.    Abdominal:      General: Suspicious (2 pts)   EKG Interpretation   Normal (0 pts)   Non-Specific Repolarization Disturbance (1 pt)   Significant ST-Depression (2 pts)   Age of Patient (in years)   = 39 (0 pts)   46-64 (1 pt)   = 65 (2 pts)   Risk Factors   No Risk Factors (0 pts)   1-2 Risk Factors (1 pt)   = 3 Risk Factors (2 pts)   Risk Factors Include:   Hypercholesterolemia   Hypertension   Diabetes Mellitus   Cigarette smoking   Positive family history   Obesity   CAD   (SLE, CKDz, HIV, Cocaine abuse)   Troponin Levels   = Normal Limit (0 pts)   1-3 Times Normal Limit (1 pt)   > 3 Times Normal Limit (2 pts)  TOTAL:    Percent Risk for Major Adverse Cardiac Event (MACE)  0-3 pts indicates low risk for MACE   2.5% (DISCHARGE)   4-7 pts indicates moderate risk for MACE  20.3% (OBS)  8-10 pts indicates high risk for MACE  72.7% (EARLY INVASIVE TX)    CDU CB / Lio Amanda is a 49 y.o. female who presents with complaints of dizziness, nausea, chest discomfort and shortness of breath for approximately 2 days. 1. IV hydration  · Symptom management  · 2D echo  · Continue home medications and pain control  · Monitor vitals, labs, and imaging  · DISPO: pending consults and clinical improvement    CONSULTS:    None    PROCEDURES:  Not indicated       PATIENT REFERRED TO:    No follow-up provider specified. --  My Supervising Physician for today is Dr. Jose Doll  We discussed and agreed upon a treatment plan  Willem Marcano, [facility]   Emergency Medicine Resident     This dictation was generated by voice recognition computer software. Although all attempts are made to edit the dictation for accuracy, there may be errors in the transcription that are not intended.

## 2021-05-29 NOTE — DISCHARGE SUMMARY
CDU Discharge Summary        Patient:  Nati Crowder  YOB: 1970    MRN: [id number]   Acct: [de-identified]    Primary Care Physician: Trisha Jacques MD    Admit date:  5/24/2021  3:48 PM  Discharge date: 5/25/2021  6:00 PM      Discharge Diagnoses:     1.)  Syncope, dizziness, acute onset, uncertain etiology. Improved with medications. 2.  Suicidal ideation. Evaluated by psychiatry. Not felt to require inpatient admission. Follow-up:  Call today/tomorrow for a follow up appointment with Trisha Jacques MD , or return to the Emergency Room with worsening symptoms    Stressed to patient the importance of following up with primary care doctor for further workup/management of symptoms. Pt verbalizes understanding and agrees with plan. Discharge Medication Changes:       Medication List      START taking these medications    meclizine 25 MG tablet  Commonly known as: ANTIVERT  Take 1 tablet by mouth 3 times daily as needed for Dizziness        CHANGE how you take these medications    * benzonatate 100 MG capsule  Commonly known as: Tessalon Perles  Take 1 capsule by mouth 3 times daily as needed for Cough  What changed: Another medication with the same name was added. Make sure you understand how and when to take each. * benzonatate 100 MG capsule  Commonly known as: TESSALON  Take 1 capsule by mouth 3 times daily as needed for Cough  What changed: You were already taking a medication with the same name, and this prescription was added. Make sure you understand how and when to take each. * ondansetron 4 MG disintegrating tablet  Commonly known as: ZOFRAN-ODT  What changed: Another medication with the same name was added. Make sure you understand how and when to take each. * ondansetron 4 MG disintegrating tablet  Commonly known as: Zofran ODT  Take 1 tablet by mouth every 8 hours as needed for Nausea  What changed: Another medication with the same name was added.  Make sure you understand how and when to take each. * ondansetron 4 MG disintegrating tablet  Commonly known as: ZOFRAN-ODT  Take 1 tablet by mouth 3 times daily as needed for Nausea or Vomiting  What changed: You were already taking a medication with the same name, and this prescription was added. Make sure you understand how and when to take each. * This list has 5 medication(s) that are the same as other medications prescribed for you. Read the directions carefully, and ask your doctor or other care provider to review them with you.             CONTINUE taking these medications    acetaminophen 500 MG tablet  Commonly known as: APAP Extra Strength  Take 2 tablets by mouth every 6 hours as needed for Pain     albuterol sulfate  (90 Base) MCG/ACT inhaler  Commonly known as: Ventolin HFA  Inhale 2 puffs into the lungs every 6 hours as needed for Wheezing     Aspirin Adult Low Strength 81 MG EC tablet  Generic drug: aspirin  TAKE 1 TABLET DAILY     atenolol 50 MG tablet  Commonly known as: TENORMIN  TAKE 1 TABLET DAILY     atorvastatin 40 MG tablet  Commonly known as: LIPITOR  TAKE 1 TABLET DAILY     Benzocaine 15 MG Lozg  Take 1 lozenge by mouth 3 times daily     Breo Ellipta 200-25 MCG/INH Aepb inhaler  Generic drug: Fluticasone furoate-vilanterol     Comfort EZ Pen Needles 32G X 6 MM Misc  Generic drug: Insulin Pen Needle  USE AS DIRECTED DAILY     cyanocobalamin 1000 MCG tablet     cyclobenzaprine 5 MG tablet  Commonly known as: FLEXERIL     DULoxetine 30 MG extended release capsule  Commonly known as: CYMBALTA     esomeprazole 40 MG delayed release capsule  Commonly known as: NexIUM  Take 1 capsule by mouth 2 times daily     ferrous sulfate 325 (65 Fe) MG EC tablet  Commonly known as: FE TABS 325  take 1 tablet by mouth twice a day     fluticasone 50 MCG/ACT nasal spray  Commonly known as: Flonase  1 spray by Each Nare route daily     furosemide 20 MG tablet  Commonly known as: LASIX  TAKE 1 TABLET DAILY gabapentin 300 MG capsule  Commonly known as: NEURONTIN  TAKE 1 CAPSULE THREE TIMES A DAY     hydrOXYzine 25 MG capsule  Commonly known as: VISTARIL     ibuprofen 600 MG tablet  Commonly known as: ADVIL;MOTRIN  Take 1 tablet by mouth every 8 hours as needed for Pain     Incontinence Supply Disposable Misc  1 each by Does not apply route 3 times daily Diapers     ipratropium 17 MCG/ACT inhaler  Commonly known as: Atrovent HFA  Inhale 2 puffs into the lungs every 6 hours     Lancets Misc  Use to check sugars daily. Latuda 80 MG Tabs tablet  Generic drug: lurasidone     levothyroxine 150 MCG tablet  Commonly known as: SYNTHROID  TAKE 1 TABLET DAILY     lisinopril 40 MG tablet  Commonly known as: PRINIVIL;ZESTRIL  TAKE 1 TABLET DAILY     Lumigan 0.01 % Soln ophthalmic drops  Generic drug: bimatoprost     meloxicam 15 MG tablet  Commonly known as: MOBIC  TAKE 1 TABLET DAILY     metFORMIN 1000 MG tablet  Commonly known as: GLUCOPHAGE  TAKE 1 TABLET TWICE A DAY     Myrbetriq 50 MG Tb24  Generic drug: mirabegron  Take 50 mg by mouth daily     nitroGLYCERIN 0.4 MG SL tablet  Commonly known as: NITROSTAT  TAKE 1 TABLET UNDER THE TONGUE EVERY 5 MINUTES AS NEEDED FOR CHESTPAIN     NONFORMULARY     nystatin 433207 UNIT/GM powder  Commonly known as: MYCOSTATIN  Apply 3 times daily. OLANZapine 10 MG tablet  Commonly known as: ZYPREXA  TAKE 1 TABLET AT BEDTIME.      omeprazole 40 MG delayed release capsule  Commonly known as: PRILOSEC  TAKE 1 CAPSULE DAILY     potassium chloride 10 MEQ extended release capsule  Commonly known as: MICRO-K  TAKE 1 CAPSULE DAILY     pyridoxine 100 MG tablet  Commonly known as: B-6     sertraline 100 MG tablet  Commonly known as: ZOLOFT     Spiriva HandiHaler 18 MCG inhalation capsule  Generic drug: tiotropium     sucralfate 1 GM tablet  Commonly known as: Carafate  Take 1 tablet by mouth 4 times daily     traZODone 100 MG tablet  Commonly known as: DESYREL  TAKE 1 TABLET BY MOUTH NIGHTLY True Metrix Blood Glucose Test strip  Generic drug: blood glucose test strips  USE AS DIRECTED DAILY AS NEEDED           Where to Get Your Medications      You can get these medications from any pharmacy    Bring a paper prescription for each of these medications  · benzonatate 100 MG capsule  · meclizine 25 MG tablet  · ondansetron 4 MG disintegrating tablet         Diet:  No diet orders on file, advance as tolerated     Activity:  As tolerated    Consultants: IP CONSULT TO PSYCHIATRY    Procedures:  Not indicated      Diagnostic Test:   Results for orders placed or performed during the hospital encounter of 05/24/21   COVID-19, Rapid    Specimen: Nasopharyngeal Swab   Result Value Ref Range    Specimen Description . NASOPHARYNGEAL SWAB     SARS-CoV-2, Rapid Not Detected Not Detected   CBC Auto Differential   Result Value Ref Range    WBC 9.4 3.5 - 11.3 k/uL    RBC 4.77 3.95 - 5.11 m/uL    Hemoglobin 12.9 11.9 - 15.1 g/dL    Hematocrit 39.5 36.3 - 47.1 %    MCV 82.8 82.6 - 102.9 fL    MCH 27.0 25.2 - 33.5 pg    MCHC 32.7 28.4 - 34.8 g/dL    RDW 12.3 11.8 - 14.4 %    Platelets 242 320 - 814 k/uL    MPV 10.6 8.1 - 13.5 fL    NRBC Automated 0.0 0.0 per 100 WBC    Differential Type NOT REPORTED     Seg Neutrophils 53 36 - 65 %    Lymphocytes 39 24 - 43 %    Monocytes 6 3 - 12 %    Eosinophils % 2 1 - 4 %    Basophils 0 0 - 2 %    Immature Granulocytes 0 0 %    Segs Absolute 4.91 1.50 - 8.10 k/uL    Absolute Lymph # 3.68 1.10 - 3.70 k/uL    Absolute Mono # 0.56 0.10 - 1.20 k/uL    Absolute Eos # 0.18 0.00 - 0.44 k/uL    Basophils Absolute 0.04 0.00 - 0.20 k/uL    Absolute Immature Granulocyte 0.04 0.00 - 0.30 k/uL    WBC Morphology NOT REPORTED     RBC Morphology NOT REPORTED     Platelet Estimate NOT REPORTED    Basic Metabolic Panel w/ Reflex to MG   Result Value Ref Range    Glucose 342 (H) 70 - 99 mg/dL    BUN 17 6 - 20 mg/dL    CREATININE 0.95 (H) 0.50 - 0.90 mg/dL    Bun/Cre Ratio NOT REPORTED 9 - 20    Calcium 8.9 8.6 - 10.4 mg/dL    Sodium 133 (L) 135 - 144 mmol/L    Potassium 3.9 3.7 - 5.3 mmol/L    Chloride 95 (L) 98 - 107 mmol/L    CO2 19 (L) 20 - 31 mmol/L    Anion Gap 19 (H) 9 - 17 mmol/L    GFR Non-African American >60 >60 mL/min    GFR African American >60 >60 mL/min    GFR Comment          GFR Staging NOT REPORTED    Troponin   Result Value Ref Range    Troponin, High Sensitivity 6 0 - 14 ng/L    Troponin T NOT REPORTED <0.03 ng/mL    Troponin Interp NOT REPORTED    Brain Natriuretic Peptide   Result Value Ref Range    Pro-BNP 28 <300 pg/mL    BNP Interpretation Pro-BNP Reference Range:    POC Glucose Fingerstick   Result Value Ref Range    POC Glucose 160 (H) 65 - 105 mg/dL   POC Glucose Fingerstick   Result Value Ref Range    POC Glucose 217 (H) 65 - 105 mg/dL   POC Glucose Fingerstick   Result Value Ref Range    POC Glucose 298 (H) 65 - 105 mg/dL   POC Glucose Fingerstick   Result Value Ref Range    POC Glucose 252 (H) 65 - 105 mg/dL   POC Glucose Fingerstick   Result Value Ref Range    POC Glucose 211 (H) 65 - 105 mg/dL   EKG 12 Lead   Result Value Ref Range    Ventricular Rate 69 BPM    Atrial Rate 69 BPM    P-R Interval 192 ms    QRS Duration 64 ms    Q-T Interval 406 ms    QTc Calculation (Bazett) 435 ms    P Axis 20 degrees    R Axis -21 degrees    T Axis 76 degrees     ECHO Complete 2D W Doppler W Color    Result Date: 5/25/2021  Transthoracic Echocardiography Report (TTE)  Patient Name PALOMARES       Date of Study               05/25/2021               Chava Primus   Date of      1970  Gender                      Female  Birth   Age          49 year(s)  Race                           Room Number  9519        Height:                     64 inch, 162.56 cm   Corporate ID [id number]    Weight:                     220 pounds, 99.8 kg  #   Patient Acct [de-identified]   BSA:          2.04 m^2      BMI:     37.76 kg/m^2  #   MR #         [id number]     Sonographer                 José Miguel Julien   Accession # [id number]  Interpreting Physician      [address] [place] [address]   Fellow                   Referring Nurse                           Practitioner   Interpreting             Referring Physician         Brent Lopez  Fellow  Type of Study   TTE procedure:2D Echocardiogram, M-Mode, Doppler, Color Doppler. Procedure Date Date: 05/25/2021 Start: 09:19 AM Study Location: [facility] Technical Quality: Fair visualization Indications:Dizziness. History / Tech. Comments: Procedure explained to patient. HTN. DM type 2. COPD. DELGADO. Patient Status: Inpatient Height: 64 inches Weight: 220 pounds BSA: 2.04 m^2 BMI: 37.76 kg/m^2 CONCLUSIONS Summary Left ventricle is normal in size Global left ventricular systolic function is normal. Calculated ejection fraction by 3D Heart Model is 61%. Mild left ventricular hypertrophy. Grade I (mild) left ventricular diastolic dysfunction. Aortic valve is sclerotic but opens well. Trivial aortic insufficiency. Thickened mitral valve leaflets. Trivial mitral regurgitation. Trivial tricuspid regurgitation. Estimated right ventricular systolic pressure is [id number]. Signature ----------------------------------------------------------------------------  Electronically signed by Sharmaine Portillo on 05/25/2021 12:20  PM ---------------------------------------------------------------------------- ----------------------------------------------------------------------------  Electronically signed by Lynn RodasInterpreting physician) on 05/25/2021  02:34 PM ---------------------------------------------------------------------------- FINDINGS Left Atrium Left atrium is normal in size. Left Ventricle Left ventricle is normal in size Global left ventricular systolic function is normal Estimated ejection fraction is 60 % . Calculated ejection fraction by 3D Heart Model is 61%. Mild left ventricular hypertrophy. Grade I (mild) left ventricular diastolic dysfunction.  Right Atrium Right atrium is normal in size. Right Ventricle Normal right ventricular size and function. Mitral Valve Thickened mitral valve leaflets. Trivial mitral regurgitation. Aortic Valve Aortic valve is sclerotic but opens well. Aortic valve is trileaflet. Trivial aortic insufficiency. Tricuspid Valve No obvious valvular abnormality. Trivial tricuspid regurgitation. Estimated right ventricular systolic pressure is [id number]. Pulmonic Valve The pulmonic valve is normal in structure. No pulmonic insufficiency. Pericardial Effusion No significant pericardial effusion is seen. Miscellaneous Normal aortic root dimension. E/E' average = 15.6. IVC Increased diameter, but still has inspiratory variation suggesting upper normal or mildly elevated RA filling pressure (i.e. CVP) .  M-mode / 2D Measurements & Calculations:   LVIDd:4.8 cm(3.7 - 5.6 cm)       Diastolic FROGDZ:30.90 ml  LVIDs:3.3 cm(2.2 - 4.0 cm)       Systolic IFEIML:75.68 ml  IVSd:1.2 cm(0.6 - 1.1 cm)        Aortic Root:2.6 cm(2.0 - 3.7 cm)  LVPWd:1.2 cm(0.6 - 1.1 cm)       LA Dimension: 4.3 cm(1.9 - 4.0 cm)  Fractional Shortenin.25 %    LA volume/Index: 27.8 ml /14m^2  Calculated LVEF (%): 61.9 %      LVOT:2 cm                                   RVDd:2.6 cm   Mitral:                                 Aortic   Valve Area (P1/2-Time): 2.75 cm^2       Peak Velocity: 1.31 m/s  Peak E-Wave: 0.89 m/s                   Mean Velocity: 0.90 m/s  Peak A-Wave: 1.03 m/s                   Peak Gradient: 6.86 mmHg  E/A Ratio: 0.87                         Mean Gradient: 4 mmHg  Peak Gradient: 3.18 mmHg  Mean Gradient: 2 mmHg  Deceleration Time: 273 msec             Area (continuity): 2.33 cm^2  P1/2t: 80 msec                          AV VTI: 28.3 cm   Area (continuity): 2.07 cm^2  Mean Velocity: 0.67 m/s   Tricuspid:                              Pulmonic:   Peak TR Velocity: 2.43 m/s              Peak Velocity: 1.07 m/s  Peak TR Gradient: 23.6196 mmHg          Peak Gradient: 4.58 mmHg  Estimated RA Pressure: 15 mmHg                                           Estimated PASP: 38.62 mmHg  Diastology / Tissue Doppler Septal Wall E' velocity:0.05 m/s Septal Wall E/E':16.4 Lateral Wall E' velocity:0.06 m/s Lateral Wall E/E':14.9    XR CHEST PORTABLE    Result Date: 5/24/2021  EXAMINATION: ONE XRAY VIEW OF THE CHEST 5/24/2021 4:36 pm COMPARISON: May 13, 2021 HISTORY: ORDERING SYSTEM PROVIDED HISTORY: chest pain TECHNOLOGIST PROVIDED HISTORY: chest pain Reason for Exam: portable, upright Acuity: Unknown FINDINGS: Borderline cardiomegaly. Cardiomediastinal silhouette otherwise within normal limits. Lungs and costophrenic sulci are clear. No pneumothorax or subdiaphragmatic free air. No acute osseous abnormality identified. No radiographic evidence of acute cardiopulmonary disease. MRI BRAIN WO CONTRAST    Result Date: 5/25/2021  EXAMINATION: MRI OF THE BRAIN WITHOUT CONTRAST  5/25/2021 11:33 am TECHNIQUE: Multiplanar multisequence MRI of the brain was performed without the administration of intravenous contrast. COMPARISON: None. HISTORY: ORDERING SYSTEM PROVIDED HISTORY: vertigo, eval post circulation TECHNOLOGIST PROVIDED HISTORY: vertigo, eval post circulation Is the patient pregnant?->No Reason for Exam: vertigo, eval post circulation FINDINGS: INTRACRANIAL STRUCTURES/VENTRICLES: There is no acute infarct. No mass effect or midline shift. No evidence of an acute intracranial hemorrhage. There is minimal global parenchymal volume loss. The sellar/suprasellar regions appear unremarkable. The normal signal voids within the major intracranial vessels appear maintained. ORBITS: The visualized portion of the orbits demonstrate no acute abnormality. SINUSES: The visualized paranasal sinuses and mastoid air cells are well aerated. BONES/SOFT TISSUES: The bone marrow signal intensity appears normal. The soft tissues demonstrate no acute abnormality. No acute intracranial abnormality. No acute infarct. recognition computer software. Although all attempts are made to edit the dictation for accuracy, there may be errors in the transcription that are not intended.

## 2021-07-09 ENCOUNTER — TELEPHONE (OUTPATIENT)
Dept: FAMILY MEDICINE CLINIC | Age: 51
End: 2021-07-09

## 2021-07-09 NOTE — TELEPHONE ENCOUNTER
Not our patient provided PCP info:    Vars[facility] [id number]   [address], [address] [place] [address] [address]   [phone]

## 2021-10-26 ENCOUNTER — TELEPHONE (OUTPATIENT)
Dept: UROLOGY | Age: 51
End: 2021-10-26

## 2021-10-26 DIAGNOSIS — R35.0 URINARY FREQUENCY: Primary | ICD-10-CM

## 2021-10-26 NOTE — TELEPHONE ENCOUNTER
Camden Sanchez called in stated that she needed an appt, is having urgency,frequency the patient also stated that she needs more diapers than what she is being supplied with, \"I'm urinating 6 x a day and I need more of the diapers. \" Write advised I would speak with MA to f/u with Jl Killian on that. Patient verbalized understanding.

## 2021-11-04 ENCOUNTER — OFFICE VISIT (OUTPATIENT)
Dept: UROLOGY | Age: 51
End: 2021-11-04
Payer: MEDICARE

## 2021-11-04 VITALS
WEIGHT: 220 LBS | TEMPERATURE: 96.5 F | HEIGHT: 64 IN | DIASTOLIC BLOOD PRESSURE: 88 MMHG | BODY MASS INDEX: 37.56 KG/M2 | SYSTOLIC BLOOD PRESSURE: 140 MMHG

## 2021-11-04 DIAGNOSIS — N39.46 MIXED INCONTINENCE URGE AND STRESS: Primary | ICD-10-CM

## 2021-11-04 DIAGNOSIS — N81.6 CYSTOCELE WITH RECTOCELE: ICD-10-CM

## 2021-11-04 DIAGNOSIS — N32.81 OVERACTIVE BLADDER: ICD-10-CM

## 2021-11-04 DIAGNOSIS — N81.10 CYSTOCELE WITH RECTOCELE: ICD-10-CM

## 2021-11-04 PROCEDURE — G8427 DOCREV CUR MEDS BY ELIG CLIN: HCPCS | Performed by: UROLOGY

## 2021-11-04 PROCEDURE — 1036F TOBACCO NON-USER: CPT | Performed by: UROLOGY

## 2021-11-04 PROCEDURE — G8484 FLU IMMUNIZE NO ADMIN: HCPCS | Performed by: UROLOGY

## 2021-11-04 PROCEDURE — G8417 CALC BMI ABV UP PARAM F/U: HCPCS | Performed by: UROLOGY

## 2021-11-04 PROCEDURE — 99214 OFFICE O/P EST MOD 30 MIN: CPT | Performed by: UROLOGY

## 2021-11-04 PROCEDURE — 3017F COLORECTAL CA SCREEN DOC REV: CPT | Performed by: UROLOGY

## 2021-11-04 RX ORDER — METAXALONE 800 MG/1
TABLET ORAL
COMMUNITY
Start: 2021-10-11 | End: 2022-07-08

## 2021-11-04 ASSESSMENT — ENCOUNTER SYMPTOMS
CONSTIPATION: 0
WHEEZING: 0
SHORTNESS OF BREATH: 0
NAUSEA: 1
COUGH: 1
DIARRHEA: 0
EYE PAIN: 0
VOMITING: 0
BACK PAIN: 1
ABDOMINAL PAIN: 1
EYE REDNESS: 0

## 2021-11-04 NOTE — LETTER
[address] [address]  Phone: [phone]  Fax: [phone]    Anali Rivera MD    November 4, 2021     Dalton Hauser, [facility]    Patient: Nena Art   MR Number: [id number]   YOB: 1970   Date of Visit: 11/4/2021       Dear Dalton Hauser:    Thank you for referring Quynh Garcia to me for evaluation/treatment. Below are the relevant portions of my assessment and plan of care. 1. Mixed incontinence urge and stress    2. Overactive bladder    3. Cystocele with rectocele        She has severe urinary incontinence- uncontrolled DM and drinks a significant amount of pop/day. She understands it is her responsibility to get these under control to help with her urinary incontinence. Refer to gynecology cystocele/rectocele found on UDS in May 2021. Timed and double voiding. Call with new or worsening urinary symptoms. Will place orders for brief. Return in about 6 months (around 5/4/2022) for urinary incontinence. Prescriptions Ordered:  Orders Placed This Encounter   Medications    Incontinence Supply Disposable MISC     Sig: Apply 6 diapers daily for mixed urinary incontinence. Dispense:  180 each     Refill:  5      Orders Placed:  Orders Placed This Encounter   Procedures   [address], Tracey Montgomery DO, Gynecology, [address]     Referral Priority:   Routine     Referral Type:   Eval and Treat     Referral Reason:   Specialty Services Required     Referred to Provider:   Charlene Lagunas DO     Requested Specialty:   Obstetrics & Gynecology     Number of Visits Requested:   1         If you have questions, please do not hesitate to call me. I look forward to following Kathy Stark along with you.     Sincerely,      Anali Rivera MD

## 2021-11-04 NOTE — PROGRESS NOTES
[address] [address]  Dept: 92 [facility] Urology Office Note - Established    Patient:  Nena Art  YOB: 1970  Date: 11/4/2021    The patient is a 49 y.o. female whopresents today for evaluation of the following problems:   Chief Complaint   Patient presents with    Urinary Frequency     urinary urgency        HPI   Overactive Bladder:  Patient is here today for an overactive bladder which started 3 year(s) ago. Recently the OAB symptoms: are worsening  Current medical Rx for OAB: Myrbetriq 50mg, failed oxybutynin- it did not manage her symptoms and she had constipation  Frequency: 20 minutes  Nocturia x 5   Consumption of bladder irritants? yes - Diet pepsi, one 2L/day. Incontinence? Stress incontinence: Severity = severe. Urge Incontinence:  Severity = severe. Number of pads per day: 8 (briefs)    Denies hematuria, dysuria, or recent infection. She did complete UDS in May, confirmed urge and stress incontinence. Also found to have cystocele and rectocele. She was scheduled in July for f/u but failed to show up to her appt. Hx of DM, on insulin. She goes Monday for A1c, was previously around 11. Summary of old records: N/A    Additional History: N/A    Procedures Today: N/A    Urinalysis today:  No results found for this visit on 11/04/21.     Imaging Reviewed during this Office Visit: none  (results were independently reviewed by physician and radiology report verified)    Last BUN and creatinine:  Lab Results   Component Value Date    BUN 17 05/24/2021     Lab Results   Component Value Date    CREATININE 0.95 (H) 05/24/2021       Additional Lab/Culture results: none    PAST MEDICAL, FAMILY AND SOCIAL HISTORY UPDATE:  Past Medical History:   Diagnosis Date    Arthritis     Back pain 10/29/2015    [facility] [address]     [facility] [facility]     Depression     Diabetes mellitus ([facility] [address] [facility]     Fibromyalgia     GERD (gastroesophageal reflux disease)     Glaucoma     Hx of blood clots     DVT  (20yrs ago)    Hyperlipidemia     Hypertension     Lumbosacral spondylosis without myelopathy     Morbid obesity ([facility] [address] 10/12/2015    On home oxygen therapy     2 liters into the cpap machine    Pitting edema     PONV (postoperative nausea and vomiting)     Renal stones     history of renal stones    Sleep apnea     cpap    Thyroid disease     Urge incontinence 1/9/2019    Warts, genital      Past Surgical History:   Procedure Laterality Date    CHOLECYSTECTOMY      COLONOSCOPY N/A 5/21/2020    COLONOSCOPY POLYPECTOMY SNARE/COLD BIOPSY performed by Den Suárez MD at [facility]  05/02/2017    HERNIA REPAIR N/A 5/2/2017    HERNIA INCISIONAL REPAIR LAPAROSCOPIC ROBOTIC , lysis of adhensions performed by Heriberto Harris MD at [address] [place] [address]  10/10/2018    camacho mbnb #1 marcaine xylocaine,    NERVE BLOCK  10/23/2018    camacho si #1 No steroid    TUBAL LIGATION      UPPER GASTROINTESTINAL ENDOSCOPY N/A 5/21/2020    EGD BIOPSY performed by Den Suárez MD at [place] [address] N/A 7/20/2020    EGD BIOPSY AND PHOTOS performed by Den Suárez MD at [facility]     Family History   Problem Relation Age of Onset    Hypertension Mother     Diabetes Mother     COPD Mother     Lung Cancer Father     Brain Cancer Father      Outpatient Medications Marked as Taking for the 11/4/21 encounter (Office Visit) with Kg Goldberg MD   Medication Sig Dispense Refill    Incontinence Supply Disposable MISC Apply 6 diapers daily for mixed urinary incontinence.  180 each 5    ondansetron (ZOFRAN-ODT) 4 MG disintegrating tablet Take 1 tablet by mouth 3 times daily as needed for Nausea or Vomiting 21 tablet 0    ondansetron (ZOFRAN ODT) 4 MG disintegrating tablet Take 1 tablet by mouth every 8 hours as needed for Nausea 20 tablet 0    benzonatate (TESSALON PERLES) 100 MG capsule Take 1 capsule by mouth 3 times daily as needed for Cough 10 capsule 0    ondansetron (ZOFRAN-ODT) 4 MG disintegrating tablet       pyridoxine (B-6) 100 MG tablet Take 100 mg by mouth daily      Fluticasone furoate-vilanterol (BREO ELLIPTA) 200-25 MCG/INH AEPB inhaler Inhale 1 puff into the lungs 2 times daily      cyclobenzaprine (FLEXERIL) 5 MG tablet Take 5 mg by mouth 2 times daily as needed      cyanocobalamin 1000 MCG tablet Take 1,000 mcg by mouth daily      MYRBETRIQ 50 MG TB24 Take 50 mg by mouth daily 30 tablet 11    NONFORMULARY Place 1 drop into both eyes daily Indications: for dry eyes Optase eye drop      tiotropium (SPIRIVA HANDIHALER) 18 MCG inhalation capsule Inhale 18 mcg into the lungs daily      furosemide (LASIX) 20 MG tablet TAKE 1 TABLET DAILY 60 tablet 2    omeprazole (PRILOSEC) 40 MG delayed release capsule TAKE 1 CAPSULE DAILY 30 capsule 3    Benzocaine 15 MG LOZG Take 1 lozenge by mouth 3 times daily 18 lozenge 0    ibuprofen (ADVIL;MOTRIN) 600 MG tablet Take 1 tablet by mouth every 8 hours as needed for Pain 30 tablet 0    potassium chloride (MICRO-K) 10 MEQ extended release capsule TAKE 1 CAPSULE DAILY 28 capsule 2    traZODone (DESYREL) 100 MG tablet TAKE 1 TABLET BY MOUTH NIGHTLY 30 tablet 0    atorvastatin (LIPITOR) 40 MG tablet TAKE 1 TABLET DAILY 30 tablet 5    ASPIRIN ADULT LOW STRENGTH 81 MG EC tablet TAKE 1 TABLET DAILY 30 tablet 5    atenolol (TENORMIN) 50 MG tablet TAKE 1 TABLET DAILY 30 tablet 5    levothyroxine (SYNTHROID) 150 MCG tablet TAKE 1 TABLET DAILY 30 tablet 5    lisinopril (PRINIVIL;ZESTRIL) 40 MG tablet TAKE 1 TABLET DAILY 30 tablet 5    metFORMIN (GLUCOPHAGE) 1000 MG tablet TAKE 1 TABLET TWICE A DAY 60 tablet 5    ipratropium (ATROVENT HFA) 17 MCG/ACT inhaler Inhale 2 puffs into the lungs every 6 hours 1 Inhaler 3    nystatin (MYCOSTATIN) 594459 UNIT/GM powder Exam  Constitutional: Patient in no acute distress. Neuro: Alert andoriented to person, place and time. Psych: Mood normal, affect normal  Skin: No rash noted  Lungs: Respiratory effort is normal  Cardiovascular: Warm & Pink  Abdomen: Soft, non-tender, non-distended   Bladder non-tender and not distended. Musculoskeletal: Normal gait and station      and Plan      1. Mixed incontinence urge and stress    2. Overactive bladder    3. Cystocele with rectocele           Plan:    She has severe urinary incontinence- uncontrolled DM and drinks a significant amount of pop/day. She understands it is her responsibility to get these under control to help with her urinary incontinence. Refer to gynecology cystocele/rectocele found on UDS in May 2021. Timed and double voiding. Call with new or worsening urinary symptoms. Will place orders for brief. Return in about 6 months (around 5/4/2022) for urinary incontinence. Prescriptions Ordered:  Orders Placed This Encounter   Medications    Incontinence Supply Disposable MISC     Sig: Apply 6 diapers daily for mixed urinary incontinence. Dispense:  180 each     Refill:  5      Orders Placed:  Orders Placed This Encounter   Procedures   [address] [facility], [facility], , Gynecology, [place]     Referral Priority:   Routine     Referral Type:   Eval and Treat     Referral Reason:   Specialty Services Required     Referred to Provider:   Joceline Tran DO     Requested Specialty:   Obstetrics & Gynecology     Number of Visits Requested:   Merlin Kessler MD    Reviewed and agree with the ROS entered by the MA.

## 2022-03-03 ENCOUNTER — APPOINTMENT (OUTPATIENT)
Dept: CT IMAGING | Age: 52
End: 2022-03-03
Payer: MEDICARE

## 2022-03-03 ENCOUNTER — HOSPITAL ENCOUNTER (EMERGENCY)
Age: 52
Discharge: HOME | End: 2022-03-03
Attending: EMERGENCY MEDICINE
Payer: MEDICARE

## 2022-03-03 VITALS
RESPIRATION RATE: 18 BRPM | BODY MASS INDEX: 37.76 KG/M2 | SYSTOLIC BLOOD PRESSURE: 117 MMHG | OXYGEN SATURATION: 94 % | TEMPERATURE: 97.3 F | HEART RATE: 64 BPM | DIASTOLIC BLOOD PRESSURE: 74 MMHG | WEIGHT: 220 LBS

## 2022-03-03 DIAGNOSIS — S16.1XXA ACUTE STRAIN OF NECK MUSCLE, INITIAL ENCOUNTER: Primary | ICD-10-CM

## 2022-03-03 PROCEDURE — 99284 EMERGENCY DEPT VISIT MOD MDM: CPT

## 2022-03-03 PROCEDURE — 96372 THER/PROPH/DIAG INJ SC/IM: CPT

## 2022-03-03 PROCEDURE — 70450 CT HEAD/BRAIN W/O DYE: CPT

## 2022-03-03 PROCEDURE — APPSS60 APP SPLIT SHARED TIME 46-60 MINUTES: Performed by: REGISTERED NURSE

## 2022-03-03 PROCEDURE — 72128 CT CHEST SPINE W/O DYE: CPT

## 2022-03-03 PROCEDURE — 72125 CT NECK SPINE W/O DYE: CPT

## 2022-03-03 PROCEDURE — 72131 CT LUMBAR SPINE W/O DYE: CPT

## 2022-03-03 PROCEDURE — 6360000002 HC RX W HCPCS: Performed by: STUDENT IN AN ORGANIZED HEALTH CARE EDUCATION/TRAINING PROGRAM

## 2022-03-03 PROCEDURE — 6370000000 HC RX 637 (ALT 250 FOR IP): Performed by: STUDENT IN AN ORGANIZED HEALTH CARE EDUCATION/TRAINING PROGRAM

## 2022-03-03 RX ORDER — KETOROLAC TROMETHAMINE 30 MG/ML
30 INJECTION, SOLUTION INTRAMUSCULAR; INTRAVENOUS ONCE
Status: COMPLETED | OUTPATIENT
Start: 2022-03-03 | End: 2022-03-03

## 2022-03-03 RX ORDER — LIDOCAINE 50 MG/G
1 PATCH TOPICAL DAILY
Qty: 10 PATCH | Refills: 0 | Status: SHIPPED | OUTPATIENT
Start: 2022-03-03 | End: 2022-03-13

## 2022-03-03 RX ORDER — IBUPROFEN 800 MG/1
800 TABLET ORAL 2 TIMES DAILY PRN
Qty: 30 TABLET | Refills: 0 | Status: SHIPPED | OUTPATIENT
Start: 2022-03-03 | End: 2022-08-02

## 2022-03-03 RX ORDER — ORPHENADRINE CITRATE 30 MG/ML
60 INJECTION INTRAMUSCULAR; INTRAVENOUS ONCE
Status: COMPLETED | OUTPATIENT
Start: 2022-03-03 | End: 2022-03-03

## 2022-03-03 RX ORDER — LIDOCAINE 4 G/G
1 PATCH TOPICAL ONCE
Status: DISCONTINUED | OUTPATIENT
Start: 2022-03-03 | End: 2022-03-03 | Stop reason: HOSPADM

## 2022-03-03 RX ADMIN — KETOROLAC TROMETHAMINE 30 MG: 30 INJECTION, SOLUTION INTRAMUSCULAR; INTRAVENOUS at 13:08

## 2022-03-03 RX ADMIN — ORPHENADRINE CITRATE 60 MG: 30 INJECTION INTRAMUSCULAR; INTRAVENOUS at 10:33

## 2022-03-03 ASSESSMENT — ENCOUNTER SYMPTOMS
VOMITING: 0
COUGH: 0
BACK PAIN: 1
PHOTOPHOBIA: 0
NAUSEA: 0
SHORTNESS OF BREATH: 0
WHEEZING: 0

## 2022-03-03 ASSESSMENT — PAIN DESCRIPTION - LOCATION: LOCATION: BACK

## 2022-03-03 ASSESSMENT — PAIN - FUNCTIONAL ASSESSMENT: PAIN_FUNCTIONAL_ASSESSMENT: 0-10

## 2022-03-03 ASSESSMENT — PAIN DESCRIPTION - PAIN TYPE: TYPE: ACUTE PAIN;CHRONIC PAIN

## 2022-03-03 ASSESSMENT — PAIN DESCRIPTION - PROGRESSION: CLINICAL_PROGRESSION: NOT CHANGED

## 2022-03-03 ASSESSMENT — PAIN DESCRIPTION - ONSET: ONSET: SUDDEN

## 2022-03-03 ASSESSMENT — PAIN SCALES - GENERAL
PAINLEVEL_OUTOF10: 4
PAINLEVEL_OUTOF10: 10

## 2022-03-03 ASSESSMENT — PAIN DESCRIPTION - DESCRIPTORS: DESCRIPTORS: ACHING

## 2022-03-03 ASSESSMENT — PAIN DESCRIPTION - ORIENTATION: ORIENTATION: MID

## 2022-03-03 ASSESSMENT — PAIN DESCRIPTION - FREQUENCY: FREQUENCY: CONTINUOUS
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
## 2022-03-03 NOTE — ED NOTES
Pt ambulated to bathroom with steady gait and returned to bed in low position. Call light within reach.       Caridad Duverney, RN  03/03/22 [id number]
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
## 2022-04-29 ENCOUNTER — HOSPITAL ENCOUNTER (EMERGENCY)
Age: 52
Discharge: HOME | End: 2022-04-29
Attending: EMERGENCY MEDICINE
Payer: MEDICARE

## 2022-04-29 VITALS
HEART RATE: 69 BPM | OXYGEN SATURATION: 97 % | WEIGHT: 228 LBS | DIASTOLIC BLOOD PRESSURE: 86 MMHG | HEIGHT: 64 IN | SYSTOLIC BLOOD PRESSURE: 127 MMHG | TEMPERATURE: 97.4 F | RESPIRATION RATE: 14 BRPM | BODY MASS INDEX: 38.93 KG/M2

## 2022-04-29 DIAGNOSIS — E11.65 HYPERGLYCEMIA DUE TO DIABETES MELLITUS (HCC): ICD-10-CM

## 2022-04-29 DIAGNOSIS — K52.9 GASTROENTERITIS: Primary | ICD-10-CM

## 2022-04-29 LAB
-: ABNORMAL
ABSOLUTE EOS #: 0.22 K/UL (ref 0–0.44)
ABSOLUTE IMMATURE GRANULOCYTE: <0.03 K/UL (ref 0–0.3)
ABSOLUTE LYMPH #: 3.23 K/UL (ref 1.1–3.7)
ABSOLUTE MONO #: 0.6 K/UL (ref 0.1–1.2)
ALBUMIN SERPL-MCNC: 4 G/DL (ref 3.5–5.2)
ALBUMIN/GLOBULIN RATIO: 1.4 (ref 1–2.5)
ALP BLD-CCNC: 81 U/L (ref 35–104)
ALT SERPL-CCNC: 12 U/L (ref 5–33)
ANION GAP SERPL CALCULATED.3IONS-SCNC: 15 MMOL/L (ref 9–17)
AST SERPL-CCNC: 20 U/L
BASOPHILS # BLD: 1 % (ref 0–2)
BASOPHILS ABSOLUTE: 0.05 K/UL (ref 0–0.2)
BILIRUB SERPL-MCNC: 0.38 MG/DL (ref 0.3–1.2)
BILIRUBIN URINE: NEGATIVE
BUN BLDV-MCNC: 13 MG/DL (ref 6–20)
CALCIUM SERPL-MCNC: 8.9 MG/DL (ref 8.6–10.4)
CASTS UA: ABNORMAL /LPF (ref 0–8)
CHLORIDE BLD-SCNC: 89 MMOL/L (ref 98–107)
CO2: 23 MMOL/L (ref 20–31)
COLOR: YELLOW
CREAT SERPL-MCNC: 0.79 MG/DL (ref 0.5–0.9)
EOSINOPHILS RELATIVE PERCENT: 3 % (ref 1–4)
EPITHELIAL CELLS UA: ABNORMAL /HPF (ref 0–5)
GFR AFRICAN AMERICAN: >60 ML/MIN
GFR NON-AFRICAN AMERICAN: >60 ML/MIN
GFR SERPL CREATININE-BSD FRML MDRD: ABNORMAL ML/MIN/{1.73_M2}
GLUCOSE BLD-MCNC: 463 MG/DL (ref 65–105)
GLUCOSE BLD-MCNC: 508 MG/DL (ref 70–99)
GLUCOSE URINE: ABNORMAL
HCT VFR BLD CALC: 38.2 % (ref 36.3–47.1)
HEMOGLOBIN: 13 G/DL (ref 11.9–15.1)
IMMATURE GRANULOCYTES: 0 %
KETONES, URINE: NEGATIVE
LEUKOCYTE ESTERASE, URINE: NEGATIVE
LIPASE: 49 U/L (ref 13–60)
LYMPHOCYTES # BLD: 37 % (ref 24–43)
MCH RBC QN AUTO: 27.1 PG (ref 25.2–33.5)
MCHC RBC AUTO-ENTMCNC: 34 G/DL (ref 28.4–34.8)
MCV RBC AUTO: 79.6 FL (ref 82.6–102.9)
MONOCYTES # BLD: 7 % (ref 3–12)
NITRITE, URINE: NEGATIVE
NRBC AUTOMATED: 0 PER 100 WBC
PDW BLD-RTO: 12.1 % (ref 11.8–14.4)
PH UA: 5 (ref 5–8)
PLATELET # BLD: 325 K/UL (ref 138–453)
PMV BLD AUTO: 10 FL (ref 8.1–13.5)
POTASSIUM SERPL-SCNC: 3.9 MMOL/L (ref 3.7–5.3)
PROTEIN UA: NEGATIVE
RBC # BLD: 4.8 M/UL (ref 3.95–5.11)
RBC # BLD: ABNORMAL 10*6/UL
RBC UA: ABNORMAL /HPF (ref 0–4)
SEG NEUTROPHILS: 52 % (ref 36–65)
SEGMENTED NEUTROPHILS ABSOLUTE COUNT: 4.56 K/UL (ref 1.5–8.1)
SODIUM BLD-SCNC: 127 MMOL/L (ref 135–144)
SPECIFIC GRAVITY UA: 1.01 (ref 1–1.03)
TOTAL PROTEIN: 6.9 G/DL (ref 6.4–8.3)
TURBIDITY: CLEAR
URINE HGB: NEGATIVE
UROBILINOGEN, URINE: NORMAL
WBC # BLD: 8.7 K/UL (ref 3.5–11.3)
WBC UA: ABNORMAL /HPF (ref 0–5)

## 2022-04-29 PROCEDURE — 96375 TX/PRO/DX INJ NEW DRUG ADDON: CPT

## 2022-04-29 PROCEDURE — 96372 THER/PROPH/DIAG INJ SC/IM: CPT

## 2022-04-29 PROCEDURE — 6360000002 HC RX W HCPCS: Performed by: STUDENT IN AN ORGANIZED HEALTH CARE EDUCATION/TRAINING PROGRAM

## 2022-04-29 PROCEDURE — 2580000003 HC RX 258: Performed by: STUDENT IN AN ORGANIZED HEALTH CARE EDUCATION/TRAINING PROGRAM

## 2022-04-29 PROCEDURE — 83690 ASSAY OF LIPASE: CPT

## 2022-04-29 PROCEDURE — 96374 THER/PROPH/DIAG INJ IV PUSH: CPT

## 2022-04-29 PROCEDURE — 81001 URINALYSIS AUTO W/SCOPE: CPT

## 2022-04-29 PROCEDURE — 6370000000 HC RX 637 (ALT 250 FOR IP): Performed by: STUDENT IN AN ORGANIZED HEALTH CARE EDUCATION/TRAINING PROGRAM

## 2022-04-29 PROCEDURE — 85025 COMPLETE CBC W/AUTO DIFF WBC: CPT

## 2022-04-29 PROCEDURE — 99284 EMERGENCY DEPT VISIT MOD MDM: CPT

## 2022-04-29 PROCEDURE — 82947 ASSAY GLUCOSE BLOOD QUANT: CPT

## 2022-04-29 PROCEDURE — 2500000003 HC RX 250 WO HCPCS: Performed by: STUDENT IN AN ORGANIZED HEALTH CARE EDUCATION/TRAINING PROGRAM

## 2022-04-29 PROCEDURE — 80053 COMPREHEN METABOLIC PANEL: CPT

## 2022-04-29 RX ORDER — ONDANSETRON 2 MG/ML
4 INJECTION INTRAMUSCULAR; INTRAVENOUS ONCE
Status: COMPLETED | OUTPATIENT
Start: 2022-04-29 | End: 2022-04-29

## 2022-04-29 RX ORDER — INSULIN LISPRO 100 [IU]/ML
8 INJECTION, SOLUTION INTRAVENOUS; SUBCUTANEOUS ONCE
Status: COMPLETED | OUTPATIENT
Start: 2022-04-29 | End: 2022-04-29

## 2022-04-29 RX ORDER — FAMOTIDINE 10 MG/ML
20 INJECTION, SOLUTION INTRAVENOUS ONCE
Status: COMPLETED | OUTPATIENT
Start: 2022-04-29 | End: 2022-04-29

## 2022-04-29 RX ORDER — 0.9 % SODIUM CHLORIDE 0.9 %
1000 INTRAVENOUS SOLUTION INTRAVENOUS ONCE
Status: COMPLETED | OUTPATIENT
Start: 2022-04-29 | End: 2022-04-29

## 2022-04-29 RX ADMIN — FAMOTIDINE 20 MG: 10 INJECTION INTRAVENOUS at 00:59

## 2022-04-29 RX ADMIN — SODIUM CHLORIDE 1000 ML: 9 INJECTION, SOLUTION INTRAVENOUS at 01:03

## 2022-04-29 RX ADMIN — ONDANSETRON 4 MG: 2 INJECTION INTRAMUSCULAR; INTRAVENOUS at 01:03

## 2022-04-29 RX ADMIN — INSULIN LISPRO 8 UNITS: 100 INJECTION, SOLUTION INTRAVENOUS; SUBCUTANEOUS at 03:22

## 2022-04-29 ASSESSMENT — PAIN DESCRIPTION - LOCATION: LOCATION: BACK

## 2022-04-29 ASSESSMENT — ENCOUNTER SYMPTOMS
BACK PAIN: 0
SHORTNESS OF BREATH: 0
ABDOMINAL PAIN: 1
DIARRHEA: 1
COUGH: 0
CHEST TIGHTNESS: 0
COLOR CHANGE: 0
WHEEZING: 0
CONSTIPATION: 0
VOMITING: 1
NAUSEA: 1

## 2022-04-29 ASSESSMENT — PAIN SCALES - GENERAL: PAINLEVEL_OUTOF10: 6

## 2022-04-29 ASSESSMENT — PAIN - FUNCTIONAL ASSESSMENT: PAIN_FUNCTIONAL_ASSESSMENT: 0-10

## 2022-04-29 NOTE — ED PROVIDER NOTES
[facility]  Emergency Department Encounter  EmergencyMedicine Resident     Pt Marai G Gardner  MRN: [id number]  [place] 1970  Date of evaluation: 4/29/22  PCP:  Blanca Mathew MD    CHIEF COMPLAINT       Chief Complaint   Patient presents with    Diarrhea    Urinary Tract Infection    Blurred Vision       HISTORY OF PRESENT ILLNESS  (Location/Symptom, Timing/Onset, Context/Setting, Quality, Duration, Modifying Factors, Severity.)      Imtiaz Falcon is a 47 y.o. female who presents with diarrhea, right-sided abdominal pain, and urinary frequency. Patient states that she thinks she has a parasite inside her. Patient has a history of bipolar disorder, fibromyalgia, diabetes, GERD, hypertension, and kidney stones. States she has been taking all her medications as prescribed. She denies any chest pain or shortness of breath. She has been eating and drinking and last ate 2 hours prior to arrival.    PAST MEDICAL / SURGICAL / SOCIAL / FAMILY HISTORY      has a past medical history of Arthritis, Back pain, Bipolar 1 disorder ([address] COPD (chronic obstructive pulmonary disease) ([address] Depression, Diabetes mellitus ([address] Fibromyalgia, GERD (gastroesophageal reflux disease), Glaucoma, Hx of blood clots, Hyperlipidemia, Hypertension, Lumbosacral spondylosis without myelopathy, Morbid obesity ([address] On home oxygen therapy, Pitting edema, PONV (postoperative nausea and vomiting), Renal stones, Sleep apnea, Thyroid disease, Urge incontinence, and Warts, genital.       has a past surgical history that includes Tubal ligation; Cholecystectomy; hernia repair (05/02/2017); hernia repair (N/A, 5/2/2017); Nerve Block (10/10/2018); Nerve Block (10/23/2018); Upper gastrointestinal endoscopy (N/A, 5/21/2020); Colonoscopy (N/A, 5/21/2020); and Upper gastrointestinal endoscopy (N/A, 7/20/2020).       Social History     Socioeconomic History    Marital status:      Spouse name: Not on file    Number of children: Not on file    Years of education: Not on file    Highest education level: Not on file   Occupational History    Not on file   Tobacco Use    Smoking status: Former Smoker     Years: 25.00    Smokeless tobacco: Never Used    Tobacco comment: quit in 2005   Vaping Use    Vaping Use: Some days   Substance and Sexual Activity    Alcohol use: Yes     Alcohol/week: 10.0 - 12.0 standard drinks     Types: 10 - 12 Cans of beer per week    Drug use: Yes     Types: Marijuana Rodena Capes)    Sexual activity: Yes     Partners: Male     Birth control/protection: Surgical     Comment: S/P tubal ligation   Other Topics Concern    Not on file   Social History Narrative    Not on file     Social Determinants of Health     Financial Resource Strain:     Difficulty of Paying Living Expenses: Not on file   Food Insecurity:     Worried About Running Out of Food in the Last Year: Not on file    Hung of Food in the Last Year: Not on file   Transportation Needs:     Lack of Transportation (Medical): Not on file    Lack of Transportation (Non-Medical):  Not on file   Physical Activity:     Days of Exercise per Week: Not on file    Minutes of Exercise per Session: Not on file   Stress:     Feeling of Stress : Not on file   Social Connections:     Frequency of Communication with Friends and Family: Not on file    Frequency of Social Gatherings with Friends and Family: Not on file    Attends Tenriism Services: Not on file    Active Member of Clubs or Organizations: Not on file    Attends Club or Organization Meetings: Not on file    Marital Status: Not on file   Intimate Partner Violence:     Fear of Current or Ex-Partner: Not on file    Emotionally Abused: Not on file    Physically Abused: Not on file    Sexually Abused: Not on file   Housing Stability:     Unable to Pay for Housing in the Last Year: Not on file    Number of Places Lived in the Last Year: Not on file    Unstable Housing in the Last Year: Not on file       Family History   Problem Relation Age of Onset    Hypertension Mother     Diabetes Mother     COPD Mother     Lung Cancer Father     Brain Cancer Father        Allergies:  Ioxaglate, Iv dye [iodides], Loratadine, Compazine spansule  [prochlorperazine], Hydrochlorothiazide, Iodine, Loratadine-pseudoephedrine er, Other, Prochlorperazine edisylate, Prochlorperazine maleate, and Pseudoephedrine    Home Medications:  Prior to Admission medications    Medication Sig Start Date End Date Taking? Authorizing Provider   ibuprofen (ADVIL;MOTRIN) 800 MG tablet Take 1 tablet by mouth 2 times daily as needed for Pain 3/3/22   Binh China, DO   Incontinence Supply Disposable MISC Apply 6 diapers daily for mixed urinary incontinence.  11/4/21   ANDRES Meyer CNP   metaxalone (SKELAXIN) 800 MG tablet  10/11/21   Historical Provider, MD   ondansetron (ZOFRAN-ODT) 4 MG disintegrating tablet Take 1 tablet by mouth 3 times daily as needed for Nausea or Vomiting 5/25/21   ANDRES Barnes CNP   ondansetron (ZOFRAN ODT) 4 MG disintegrating tablet Take 1 tablet by mouth every 8 hours as needed for Nausea 5/14/21   Og Garner,    benzonatate (TESSALON PERLES) 100 MG capsule Take 1 capsule by mouth 3 times daily as needed for Cough 4/27/21   Rosa Paredes DO   ondansetron (ZOFRAN-ODT) 4 MG disintegrating tablet  3/15/21   Historical Provider, MD   pyridoxine (B-6) 100 MG tablet Take 100 mg by mouth daily    Historical Provider, MD   Fluticasone furoate-vilanterol (BREO ELLIPTA) 200-25 MCG/INH AEPB inhaler Inhale 1 puff into the lungs 2 times daily 2/22/21   Historical Provider, MD   cyclobenzaprine (FLEXERIL) 5 MG tablet Take 5 mg by mouth 2 times daily as needed 1/11/21   Historical Provider, MD   cyanocobalamin 1000 MCG tablet Take 1,000 mcg by mouth daily    Historical Provider, MD   MYRBETRIQ 50 MG TB24 Take 50 mg by mouth daily 9/23/20   Arvel Kayser, APRN - CNP   NONFORMULARY Place 1 drop into both eyes daily Indications: for dry eyes Optase eye drop    Historical Provider, MD   acetaminophen (APAP EXTRA STRENGTH) 500 MG tablet Take 2 tablets by mouth every 6 hours as needed for Pain  Patient not taking: Reported on 11/4/2021 6/6/20   Dorota Muñoz DO   tiotropium (SPIRIVA HANDIHALER) 18 MCG inhalation capsule Inhale 18 mcg into the lungs daily    Historical Provider, MD   esomeprazole (NEXIUM) 40 MG delayed release capsule Take 1 capsule by mouth 2 times daily 5/21/20 9/23/20  Sam Herrera MD   sucralfate (CARAFATE) 1 GM tablet Take 1 tablet by mouth 4 times daily 5/21/20 9/23/20  Sam Herrera MD   furosemide (LASIX) 20 MG tablet TAKE 1 TABLET DAILY 4/8/20   Malachi Lee MD   omeprazole (PRILOSEC) 40 MG delayed release capsule TAKE 1 CAPSULE DAILY 2/28/20   Malachi Lee MD   Benzocaine 15 MG LOZG Take 1 lozenge by mouth 3 times daily 11/22/19   ANDRES Nunez CNP   ibuprofen (ADVIL;MOTRIN) 600 MG tablet Take 1 tablet by mouth every 8 hours as needed for Pain 11/22/19   ANDRES Nunez CNP   potassium chloride (MICRO-K) 10 MEQ extended release capsule TAKE 1 CAPSULE DAILY 9/26/19   Malachi Lee MD   traZODone (DESYREL) 100 MG tablet TAKE 1 TABLET BY MOUTH NIGHTLY 8/25/19   Malachi Lee MD   gabapentin (NEURONTIN) 300 MG capsule TAKE 1 CAPSULE THREE TIMES A DAY 8/25/19 9/23/20  Malachi Lee MD   atorvastatin (LIPITOR) 40 MG tablet TAKE 1 TABLET DAILY 7/21/19   Kvng Story MD   ASPIRIN ADULT LOW STRENGTH 81 MG EC tablet TAKE 1 TABLET DAILY 2/20/19   Lashonda Hall MD   atenolol (TENORMIN) 50 MG tablet TAKE 1 TABLET DAILY 2/19/19   Lashonda Hall MD   levothyroxine (SYNTHROID) 150 MCG tablet TAKE 1 TABLET DAILY 2/19/19   Lashonda Hall MD   lisinopril (PRINIVIL;ZESTRIL) 40 MG tablet TAKE 1 TABLET DAILY 2/19/19   Lashonda Hall MD   metFORMIN (GLUCOPHAGE) 1000 MG tablet TAKE 1 TABLET TWICE A DAY 2/19/19   Anum Velez MD   ferrous sulfate 325 (65 Fe) MG EC tablet take 1 tablet by mouth twice a day  Patient not taking: Reported on 11/4/2021 2/18/19   Anum Velez MD   albuterol sulfate HFA (VENTOLIN HFA) 108 (90 Base) MCG/ACT inhaler Inhale 2 puffs into the lungs every 6 hours as needed for Wheezing  Patient not taking: Reported on 11/4/2021 1/9/19   Obi Waldron MD   ipratropium (ATROVENT HFA) 17 MCG/ACT inhaler Inhale 2 puffs into the lungs every 6 hours 1/9/19   Obi Waldron MD   nystatin (MYCOSTATIN) 090596 UNIT/GM powder Apply 3 times daily. 12/29/18   Anum Velez MD   meloxicam (MOBIC) 15 MG tablet TAKE 1 TABLET DAILY 12/19/18   Anum Velez MD   fluticasone Marda Gasman) 50 MCG/ACT nasal spray 1 spray by Each Nare route daily 12/3/18   Anum Velez MD   hydrOXYzine (VISTARIL) 25 MG capsule Take 25-75 mg by mouth    Historical Provider, MD   nitroGLYCERIN (NITROSTAT) 0.4 MG SL tablet TAKE 1 TABLET UNDER THE TONGUE EVERY 5 MINUTES AS NEEDED FOR CHESTPAIN 9/17/18   Mary Scott MD   DULoxetine (CYMBALTA) 30 MG extended release capsule Take 30 mg by mouth daily    Historical Provider, MD   COMFORT EZ PEN NEEDLES 32G X 6 MM MISC USE AS DIRECTED DAILY 8/15/18   Anum Velez MD   TRUE METRIX BLOOD GLUCOSE TEST strip USE AS DIRECTED DAILY AS NEEDED 12/21/17   Anum Velez MD   Lancets MISC Use to check sugars daily.  5/12/17   MD CLARIBEL Bravo 0.01 % SOLN ophthalmic drops Place 1 drop into both eyes nightly  12/22/16   Historical Provider, MD   LATUDA 80 MG TABS tablet Take 80 mg by mouth daily  3/3/16   Historical Provider, MD   sertraline (ZOLOFT) 100 MG tablet Take 1 tablet by mouth nightly 2/26/16   Historical Provider, MD   OLANZapine (ZYPREXA) 10 MG tablet TAKE 1 TABLET AT BEDTIME. 1/8/15   Kitty Morales MD       REVIEW OF SYSTEMS    (2-9 systems for level 4, 10 or more for level 5)      Review of Systems   Constitutional: Negative for chills, diaphoresis, fatigue and fever. Respiratory: Negative for cough, chest tightness, shortness of breath and wheezing. Cardiovascular: Negative for chest pain, palpitations and leg swelling. Gastrointestinal: Positive for abdominal pain, diarrhea, nausea and vomiting. Negative for constipation. Endocrine: Negative for polydipsia, polyphagia and polyuria. Genitourinary: Positive for frequency. Negative for difficulty urinating, dysuria and urgency. Musculoskeletal: Negative for arthralgias, back pain, neck pain and neck stiffness. Skin: Negative for color change, pallor and rash. Neurological: Negative for dizziness, weakness, light-headedness and headaches. PHYSICAL EXAM   (up to 7 for level 4, 8 or more for level 5)      INITIAL VITALS:   /86   Pulse 69   Temp 97.4 °F (36.3 °C) (Oral)   Resp 14   Ht 5' 4\" (1.626 m)   Wt 228 lb (103.4 kg)   SpO2 97%   BMI 39.14 kg/m²     Physical Exam  Vitals and nursing note reviewed. Constitutional:       General: She is not in acute distress. Appearance: She is well-developed. She is obese. She is not ill-appearing or diaphoretic. HENT:      Head: Normocephalic and atraumatic. Mouth/Throat:      Mouth: Mucous membranes are moist.      Pharynx: Oropharynx is clear. Eyes:      General: No scleral icterus. Conjunctiva/sclera: Conjunctivae normal.      Pupils: Pupils are equal, round, and reactive to light. Neck:      Vascular: No JVD. Trachea: No tracheal deviation. Cardiovascular:      Rate and Rhythm: Normal rate and regular rhythm. Heart sounds: Normal heart sounds. No murmur heard. No friction rub. Pulmonary:      Effort: Pulmonary effort is normal. No respiratory distress. Breath sounds: Normal breath sounds. No wheezing. Chest:      Chest wall: No tenderness. Abdominal:      General: Bowel sounds are normal. There is no distension. Palpations: Abdomen is soft. Tenderness:  There is no abdominal tenderness. There is no guarding. Musculoskeletal:      Cervical back: Normal range of motion and neck supple. Skin:     General: Skin is warm and dry. Capillary Refill: Capillary refill takes less than 2 seconds. Coloration: Skin is not pale. Findings: No erythema. Neurological:      General: No focal deficit present. Mental Status: She is alert and oriented to person, place, and time. Mental status is at baseline. Cranial Nerves: No cranial nerve deficit. Sensory: No sensory deficit. Motor: No weakness. Gait: Gait normal.   Psychiatric:         Mood and Affect: Mood normal.         Behavior: Behavior normal.         DIFFERENTIAL  DIAGNOSIS     PLAN (LABS / IMAGING / EKG):  Orders Placed This Encounter   Procedures    CBC with Auto Differential    Comprehensive Metabolic Panel w/ Reflex to MG    Lipase    Urinalysis with Microscopic    POCT glucose    POC Glucose Fingerstick       MEDICATIONS ORDERED:  Orders Placed This Encounter   Medications    0.9 % sodium chloride bolus    famotidine (PEPCID) injection 20 mg    ondansetron (ZOFRAN) injection 4 mg    insulin lispro (HUMALOG) injection vial 8 Units       DDX: Viral gastroenteritis, UTI, nephrolithiasis, electrolyte normality, colitis, cholelithiasis, cholecystitis    MDM/IMPRESSION: Is a 46-year-old female presenting with right sided specifically right upper abdominal pain. No previous belly surgeries. Patient states she feels like she has a parasite in her. Symptoms ongoing for several days. Patient was endorses urinary frequency. She has been eating and drinking fairly normally and has been taking all of her medications as prescribed. She does have diabetes DKA is a concern given the intermittent blurry vision that is not present at this time. No focal neurologic deficits. Patient ambulates with a steady gait.   Plan for abdominal labs, low threshold to scan if there are any significant abnormalities.     DIAGNOSTIC RESULTS / EMERGENCY DEPARTMENT COURSE / MDM   LAB RESULTS:  Results for orders placed or performed during the hospital encounter of 04/29/22   CBC with Auto Differential   Result Value Ref Range    WBC 8.7 3.5 - 11.3 k/uL    RBC 4.80 3.95 - 5.11 m/uL    Hemoglobin 13.0 11.9 - 15.1 g/dL    Hematocrit 38.2 36.3 - 47.1 %    MCV 79.6 (L) 82.6 - 102.9 fL    MCH 27.1 25.2 - 33.5 pg    MCHC 34.0 28.4 - 34.8 g/dL    RDW 12.1 11.8 - 14.4 %    Platelets 945 244 - 739 k/uL    MPV 10.0 8.1 - 13.5 fL    NRBC Automated 0.0 0.0 per 100 WBC    Seg Neutrophils 52 36 - 65 %    Lymphocytes 37 24 - 43 %    Monocytes 7 3 - 12 %    Eosinophils % 3 1 - 4 %    Basophils 1 0 - 2 %    Immature Granulocytes 0 0 %    Segs Absolute 4.56 1.50 - 8.10 k/uL    Absolute Lymph # 3.23 1.10 - 3.70 k/uL    Absolute Mono # 0.60 0.10 - 1.20 k/uL    Absolute Eos # 0.22 0.00 - 0.44 k/uL    Basophils Absolute 0.05 0.00 - 0.20 k/uL    Absolute Immature Granulocyte <0.03 0.00 - 0.30 k/uL    RBC Morphology MICROCYTOSIS PRESENT    Comprehensive Metabolic Panel w/ Reflex to MG   Result Value Ref Range    Glucose 508 (HH) 70 - 99 mg/dL    BUN 13 6 - 20 mg/dL    CREATININE 0.79 0.50 - 0.90 mg/dL    Calcium 8.9 8.6 - 10.4 mg/dL    Sodium 127 (L) 135 - 144 mmol/L    Potassium 3.9 3.7 - 5.3 mmol/L    Chloride 89 (L) 98 - 107 mmol/L    CO2 23 20 - 31 mmol/L    Anion Gap 15 9 - 17 mmol/L    Alkaline Phosphatase 81 35 - 104 U/L    ALT 12 5 - 33 U/L    AST 20 <32 U/L    Total Bilirubin 0.38 0.3 - 1.2 mg/dL    Total Protein 6.9 6.4 - 8.3 g/dL    Albumin 4.0 3.5 - 5.2 g/dL    Albumin/Globulin Ratio 1.4 1.0 - 2.5    GFR Non-African American >60 >60 mL/min    GFR African American >60 >60 mL/min    GFR Comment         Lipase   Result Value Ref Range    Lipase 49 13 - 60 U/L   Urinalysis with Microscopic   Result Value Ref Range    Color, UA Yellow Yellow    Turbidity UA Clear Clear    Glucose, Ur 3+ (A) NEGATIVE    Bilirubin Urine NEGATIVE NEGATIVE    Ketones, Urine NEGATIVE NEGATIVE    Specific Gravity, UA 1.010 1.005 - 1.030    Urine Hgb NEGATIVE NEGATIVE    pH, UA 5.0 5.0 - 8.0    Protein, UA NEGATIVE NEGATIVE    Urobilinogen, Urine Normal Normal    Nitrite, Urine NEGATIVE NEGATIVE    Leukocyte Esterase, Urine NEGATIVE NEGATIVE    -          WBC, UA 2 TO 5 0 - 5 /HPF    RBC, UA 0 TO 2 0 - 4 /HPF    Casts UA  0 - 8 /LPF     0 TO 2 HYALINE Reference range defined for non-centrifuged specimen. Epithelial Cells UA 2 TO 5 0 - 5 /HPF   POC Glucose Fingerstick   Result Value Ref Range    POC Glucose 463 (HH) 65 - 105 mg/dL         RADIOLOGY:  No orders to display        EKG      All EKG's are interpreted by the Emergency Department Physician who either signs or Co-signs this chart in the absence of a cardiologist.    EMERGENCY DEPARTMENT COURSE:  ED Course as of 04/29/22 0319 Fri Apr 29, 2022   0209 Blood sugar elevated, will repeat point-of-care after fluid bolus. Labs otherwise unremarkable and awaiting urinalysis. [JG]   0210 CO2: 23 [JG]   0306 Patient given subcutaneous insulin, 8 units given for hyperglycemia. Strongly recommend following up with her primary care provider tomorrow. We also discussed dietary changes and eating a low-carb diet. [JG]      ED Course User Index  [JG] Yanelis Moreno DO        PROCEDURES:      CONSULTS:  None    CRITICAL CARE:      FINAL IMPRESSION      1. Gastroenteritis    2.  Hyperglycemia due to diabetes mellitus [facility])          DISPOSITION / PLAN     DISPOSITION Decision To Discharge 04/29/2022 03:04:32 AM      PATIENT REFERRED TO:  Elvin Card MD  [address] [place] [address]  [phone]    Schedule an appointment as soon as possible for a visit in 1 day      [facility] ED  [address] [address]  [phone]  Go to   If symptoms worsen      DISCHARGE MEDICATIONS:  New Prescriptions    No medications on file       Yanelis Moreno DO  Emergency Medicine Resident    (Please note that portions of thisnote were completed with a voice recognition program.  Efforts were made to edit the dictations but occasionally words are mis-transcribed.)     Gavin Felder DO  04/29/22 [id number]

## 2022-04-29 NOTE — ED NOTES
Pt discharged from ED and SW was consulted by RN for transportation back to residence on file. Reservation # [phone] utilized.       ROBERT Love  04/29/22 9432

## 2022-04-29 NOTE — ED PROVIDER NOTES
Robin [facility]  ED     Emergency Department     Faculty Attestation    I performed a history and physical examination of the patient and discussed management with the resident. I reviewed the residents note and agree with the documented findings and plan of care. Any areas of disagreement are noted on the chart. I was personally present for the key portions of any procedures. I have documented in the chart those procedures where I was not present during the key portions. I have reviewed the emergency nurses triage note. I agree with the chief complaint, past medical history, past surgical history, allergies, medications, social and family history as documented unless otherwise noted below. For Physician Assistant/ Nurse Practitioner cases/documentation I have personally evaluated this patient and have completed at least one if not all key elements of the E/M (history, physical exam, and MDM). Additional findings are as noted. Patient presents with abdominal discomfort, nausea, diarrhea, increased urination. She says this is been ongoing for the past few days. She denies fever. She says she occasionally feels short of breath. She says she does have a history of urinary incontinence. On exam, patient is resting comfortably in the bed. Lungs clear to auscultation bilaterally and heart sounds are normal.  Abdomen is soft and nontender. No rebound or guarding is present. Will check labs and reassess.       Maryjo Pepe MD  Attending Emergency  Physician              Patricia Hatfield MD  04/29/22 2200

## 2022-04-29 NOTE — ED NOTES
Pt ambulatory to ED 10  Pt c/o diarrhea, blurred vision and increased urination and feelings of having an abdominal parasite x multiple days  Labs drawn and urine obtained       Jordan Bolaños RN  04/29/22 0688 Rory Olguin RN  04/29/22 0025

## 2022-04-29 NOTE — ED PROVIDER NOTES
Faculty Sign-Out Attestation  Handoff taken on the following patient from prior Attending Physician: Maxnie Fournier    I was available and discussed any additional care issues that arose and coordinated the management plans with the resident(s) caring for the patient during my duty period. Any areas of disagreement with residents documentation of care or procedures are noted on the chart. I was personally present for the key portions of any/all procedures during my duty period. I have documented in the chart those procedures where I was not present during the key portions.     Diarrhea, urinary complaints, lab pending, if negative may discharge    Zena Lazo DO  Attending Physician     Zena Lazo DO  04/29/22 0152    Glucose improved, instruction given, vss, talkative, ambulatory, tolerating liquids,   Discharged per plan     Zena Lazo DO  04/29/22 4565

## 2022-06-21 ENCOUNTER — OFFICE VISIT (OUTPATIENT)
Dept: UROLOGY | Age: 52
End: 2022-06-21
Payer: MEDICARE

## 2022-06-21 VITALS — WEIGHT: 228 LBS | HEIGHT: 64 IN | BODY MASS INDEX: 38.93 KG/M2

## 2022-06-21 DIAGNOSIS — N81.10 CYSTOCELE WITH RECTOCELE: Primary | ICD-10-CM

## 2022-06-21 DIAGNOSIS — N81.6 CYSTOCELE WITH RECTOCELE: Primary | ICD-10-CM

## 2022-06-21 DIAGNOSIS — N39.41 URGE INCONTINENCE OF URINE: ICD-10-CM

## 2022-06-21 DIAGNOSIS — N32.81 OAB (OVERACTIVE BLADDER): Primary | ICD-10-CM

## 2022-06-21 PROCEDURE — 99213 OFFICE O/P EST LOW 20 MIN: CPT | Performed by: UROLOGY

## 2022-06-21 PROCEDURE — 1036F TOBACCO NON-USER: CPT | Performed by: UROLOGY

## 2022-06-21 PROCEDURE — G8427 DOCREV CUR MEDS BY ELIG CLIN: HCPCS | Performed by: UROLOGY

## 2022-06-21 PROCEDURE — G8417 CALC BMI ABV UP PARAM F/U: HCPCS | Performed by: UROLOGY

## 2022-06-21 PROCEDURE — 3017F COLORECTAL CA SCREEN DOC REV: CPT | Performed by: UROLOGY

## 2022-06-21 ASSESSMENT — ENCOUNTER SYMPTOMS
SHORTNESS OF BREATH: 0
ABDOMINAL PAIN: 0
EYE REDNESS: 0
WHEEZING: 0
EYE PAIN: 0
DIARRHEA: 0
COUGH: 0
CONSTIPATION: 0
BACK PAIN: 0
EYES NEGATIVE: 1
RESPIRATORY NEGATIVE: 1
NAUSEA: 0
VOMITING: 0
GASTROINTESTINAL NEGATIVE: 1

## 2022-06-21 NOTE — PROGRESS NOTES
[address] [address]  Dept: [address] [facility] Urology Office Note - Established    Patient:  Toshia Frank  YOB: 1970  Date: 6/21/2022    The patient is a 47 y.o. female whopresents today for evaluation of the following problems:   Chief Complaint   Patient presents with    Incontinence       HPI  Here for cont oab and cystocele. She didn't f/u with gyn as suggested. She needs repair of cystocele and rectocele by gyn. She is currently on [employer] of old records: N/A    Additional History: N/A    Procedures Today: N/A    Urinalysis today:  No results found for this visit on 06/21/22. Imaging Reviewed during this Office Visit: none  (results were independently reviewed by physician and radiology report verified)    AUA Symptom Score (6/21/2022):                                Last BUN and creatinine:  Lab Results   Component Value Date    BUN 13 04/29/2022     Lab Results   Component Value Date    CREATININE 0.79 04/29/2022       Additional Lab/Culture results: none    PAST MEDICAL, FAMILY AND SOCIAL HISTORY UPDATE:  Past Medical History:   Diagnosis Date    Arthritis     Back pain 10/29/2015    Bipolar 1 disorder ([address]     COPD (chronic obstructive pulmonary disease) ([address]     Depression     Diabetes mellitus (HCC)     Fibromyalgia     GERD (gastroesophageal reflux disease)     Glaucoma     Hx of blood clots     DVT  (20yrs ago)    Hyperlipidemia     Hypertension     Lumbosacral spondylosis without myelopathy     Morbid obesity ([address] 10/12/2015    On home oxygen therapy     2 liters into the cpap machine    Pitting edema     PONV (postoperative nausea and vomiting)     Renal stones     history of renal stones    Sleep apnea     cpap    Thyroid disease     Urge incontinence 1/9/2019    Warts, genital      Past Surgical History:   Procedure mouth 2 times daily as needed      cyanocobalamin 1000 MCG tablet Take 1,000 mcg by mouth daily      MYRBETRIQ 50 MG TB24 Take 50 mg by mouth daily 30 tablet 11    NONFORMULARY Place 1 drop into both eyes daily Indications: for dry eyes Optase eye drop      tiotropium (SPIRIVA HANDIHALER) 18 MCG inhalation capsule Inhale 18 mcg into the lungs daily      furosemide (LASIX) 20 MG tablet TAKE 1 TABLET DAILY 60 tablet 2    omeprazole (PRILOSEC) 40 MG delayed release capsule TAKE 1 CAPSULE DAILY 30 capsule 3    Benzocaine 15 MG LOZG Take 1 lozenge by mouth 3 times daily 18 lozenge 0    ibuprofen (ADVIL;MOTRIN) 600 MG tablet Take 1 tablet by mouth every 8 hours as needed for Pain 30 tablet 0    potassium chloride (MICRO-K) 10 MEQ extended release capsule TAKE 1 CAPSULE DAILY 28 capsule 2    traZODone (DESYREL) 100 MG tablet TAKE 1 TABLET BY MOUTH NIGHTLY 30 tablet 0    atorvastatin (LIPITOR) 40 MG tablet TAKE 1 TABLET DAILY 30 tablet 5    ASPIRIN ADULT LOW STRENGTH 81 MG EC tablet TAKE 1 TABLET DAILY 30 tablet 5    atenolol (TENORMIN) 50 MG tablet TAKE 1 TABLET DAILY 30 tablet 5    levothyroxine (SYNTHROID) 150 MCG tablet TAKE 1 TABLET DAILY 30 tablet 5    lisinopril (PRINIVIL;ZESTRIL) 40 MG tablet TAKE 1 TABLET DAILY 30 tablet 5    metFORMIN (GLUCOPHAGE) 1000 MG tablet TAKE 1 TABLET TWICE A DAY 60 tablet 5    ferrous sulfate 325 (65 Fe) MG EC tablet take 1 tablet by mouth twice a day 60 tablet 3    ipratropium (ATROVENT HFA) 17 MCG/ACT inhaler Inhale 2 puffs into the lungs every 6 hours 1 Inhaler 3    nystatin (MYCOSTATIN) 234348 UNIT/GM powder Apply 3 times daily.  1 Bottle 0    meloxicam (MOBIC) 15 MG tablet TAKE 1 TABLET DAILY 30 tablet 0    fluticasone (FLONASE) 50 MCG/ACT nasal spray 1 spray by Each Nare route daily 1 Bottle 0    hydrOXYzine (VISTARIL) 25 MG capsule Take 25-75 mg by mouth      nitroGLYCERIN (NITROSTAT) 0.4 MG SL tablet TAKE 1 TABLET UNDER THE TONGUE EVERY 5 MINUTES AS NEEDED FOR CHESTPAIN 25 tablet 0    DULoxetine (CYMBALTA) 30 MG extended release capsule Take 30 mg by mouth daily      COMFORT EZ PEN NEEDLES 32G X 6 MM MISC USE AS DIRECTED DAILY 100 each 0    TRUE METRIX BLOOD GLUCOSE TEST strip USE AS DIRECTED DAILY AS NEEDED 100 each 5    Lancets MISC Use to check sugars daily. 100 each 3    LUMIGAN 0.01 % SOLN ophthalmic drops Place 1 drop into both eyes nightly       LATUDA 80 MG TABS tablet Take 80 mg by mouth daily       sertraline (ZOLOFT) 100 MG tablet Take 1 tablet by mouth nightly      OLANZapine (ZYPREXA) 10 MG tablet TAKE 1 TABLET AT BEDTIME. 28 tablet 1      (All medications reviewed and updated by provider sincelast office visit or hospitalization)   Ioxaglate, Iv dye [iodides], Loratadine, Compazine spansule  [prochlorperazine], Hydrochlorothiazide, Iodine, Loratadine-pseudoephedrine er, Other, Prochlorperazine edisylate, Prochlorperazine maleate, and Pseudoephedrine  Social History     Tobacco Use   Smoking Status Former Smoker    Years: 25.00   Smokeless Tobacco Never Used   Tobacco Comment    quit in 2005      (If patient a smoker, smoking cessation counseling offered)     Social History     Substance and Sexual Activity   Alcohol Use Yes    Alcohol/week: 10.0 - 12.0 standard drinks    Types: 10 - 12 Cans of beer per week       REVIEW OF SYSTEMS:  Review of Systems      Physical Exam:    There were no vitals filed for this visit. Body mass index is 39.14 kg/m². Patient is a 47 y.o. female in noacute distress and alert and oriented to person, place and time. Physical Exam  Constitutional: Patient in no acute distress. Neuro: Alert andoriented to person, place and time.   Psych: Mood normal, affect normal  Skin: No rash noted  HEENT: Head: Normocephalic and atraumatic  Conjunctivae and EOM are normal. Pupils are equal, round  Nose: Normal  Right External Ear: Normal; Left External Ear: Normal  Mouth: Mucosa Moist  Neck: Supple  Lungs: Respiratory effort is normal  Cardiovascular: Warm & Pink  Abdomen: Soft, non-tender, non-distended with no CVA,  No flank tenderness,  Or hepatosplenomegaly   Lymphatics: No palpable lymphadenopathy. and Plan      1. OAB (overactive bladder)    2. Urge incontinence of urine           Plan:    refer to gyn  Cont myrbetriq  Return in about 6 months (around 12/21/2022) for Follow up. Prescriptions Ordered:  No orders of the defined types were placed in this encounter. Orders Placed:  No orders of the defined types were placed in this encounter. Cash Connor MD    Agree with the ROS entered by the MA.

## 2022-07-07 ENCOUNTER — HOSPITAL ENCOUNTER (EMERGENCY)
Age: 52
Discharge: HOME | End: 2022-07-08
Attending: EMERGENCY MEDICINE
Payer: MEDICARE

## 2022-07-07 ENCOUNTER — APPOINTMENT (OUTPATIENT)
Dept: GENERAL RADIOLOGY | Age: 52
End: 2022-07-07
Payer: MEDICARE

## 2022-07-07 VITALS
RESPIRATION RATE: 14 BRPM | WEIGHT: 226 LBS | TEMPERATURE: 98.8 F | HEART RATE: 79 BPM | OXYGEN SATURATION: 97 % | DIASTOLIC BLOOD PRESSURE: 102 MMHG | SYSTOLIC BLOOD PRESSURE: 127 MMHG | BODY MASS INDEX: 38.79 KG/M2

## 2022-07-07 DIAGNOSIS — E87.6 HYPOKALEMIA: ICD-10-CM

## 2022-07-07 DIAGNOSIS — R73.9 HYPERGLYCEMIA: Primary | ICD-10-CM

## 2022-07-07 DIAGNOSIS — E83.42 HYPOMAGNESEMIA: ICD-10-CM

## 2022-07-07 LAB
-: NORMAL
ABSOLUTE EOS #: 0.16 K/UL (ref 0–0.44)
ABSOLUTE IMMATURE GRANULOCYTE: <0.03 K/UL (ref 0–0.3)
ABSOLUTE LYMPH #: 3.4 K/UL (ref 1.1–3.7)
ABSOLUTE MONO #: 0.54 K/UL (ref 0.1–1.2)
ALBUMIN SERPL-MCNC: 4.4 G/DL (ref 3.5–5.2)
ALBUMIN/GLOBULIN RATIO: 1.4 (ref 1–2.5)
ALP BLD-CCNC: 99 U/L (ref 35–104)
ALT SERPL-CCNC: 16 U/L (ref 5–33)
ANION GAP SERPL CALCULATED.3IONS-SCNC: 17 MMOL/L (ref 9–17)
AST SERPL-CCNC: 24 U/L
BASOPHILS # BLD: 1 % (ref 0–2)
BASOPHILS ABSOLUTE: 0.06 K/UL (ref 0–0.2)
BETA-HYDROXYBUTYRATE: 0.92 MMOL/L (ref 0.02–0.27)
BILIRUB SERPL-MCNC: 0.69 MG/DL (ref 0.3–1.2)
BILIRUBIN URINE: NEGATIVE
BUN BLDV-MCNC: 9 MG/DL (ref 6–20)
CALCIUM SERPL-MCNC: 9.4 MG/DL (ref 8.6–10.4)
CASTS UA: NORMAL /LPF (ref 0–8)
CHLORIDE BLD-SCNC: 91 MMOL/L (ref 98–107)
CO2: 23 MMOL/L (ref 20–31)
COLOR: YELLOW
CREAT SERPL-MCNC: 0.77 MG/DL (ref 0.5–0.9)
EOSINOPHILS RELATIVE PERCENT: 2 % (ref 1–4)
EPITHELIAL CELLS UA: NORMAL /HPF (ref 0–5)
FLU A ANTIGEN: NEGATIVE
FLU B ANTIGEN: NEGATIVE
GFR AFRICAN AMERICAN: >60 ML/MIN
GFR NON-AFRICAN AMERICAN: >60 ML/MIN
GFR SERPL CREATININE-BSD FRML MDRD: ABNORMAL ML/MIN/{1.73_M2}
GLUCOSE BLD-MCNC: 383 MG/DL (ref 70–99)
GLUCOSE URINE: ABNORMAL
HCT VFR BLD CALC: 39.7 % (ref 36.3–47.1)
HEMOGLOBIN: 14 G/DL (ref 11.9–15.1)
IMMATURE GRANULOCYTES: 0 %
KETONES, URINE: ABNORMAL
LEUKOCYTE ESTERASE, URINE: NEGATIVE
LIPASE: 29 U/L (ref 13–60)
LYMPHOCYTES # BLD: 37 % (ref 24–43)
MAGNESIUM: 1.3 MG/DL (ref 1.6–2.6)
MCH RBC QN AUTO: 27.1 PG (ref 25.2–33.5)
MCHC RBC AUTO-ENTMCNC: 35.3 G/DL (ref 28.4–34.8)
MCV RBC AUTO: 76.9 FL (ref 82.6–102.9)
MONOCYTES # BLD: 6 % (ref 3–12)
NITRITE, URINE: NEGATIVE
NRBC AUTOMATED: 0 PER 100 WBC
PDW BLD-RTO: 11.8 % (ref 11.8–14.4)
PH UA: 7.5 (ref 5–8)
PLATELET # BLD: 307 K/UL (ref 138–453)
PMV BLD AUTO: 9.8 FL (ref 8.1–13.5)
POTASSIUM SERPL-SCNC: 3.3 MMOL/L (ref 3.7–5.3)
PROTEIN UA: NEGATIVE
RBC # BLD: 5.16 M/UL (ref 3.95–5.11)
RBC # BLD: ABNORMAL 10*6/UL
RBC UA: NORMAL /HPF (ref 0–4)
SARS-COV-2, RAPID: NOT DETECTED
SEG NEUTROPHILS: 54 % (ref 36–65)
SEGMENTED NEUTROPHILS ABSOLUTE COUNT: 5.12 K/UL (ref 1.5–8.1)
SODIUM BLD-SCNC: 131 MMOL/L (ref 135–144)
SPECIFIC GRAVITY UA: 1.01 (ref 1–1.03)
SPECIMEN DESCRIPTION: NORMAL
TOTAL PROTEIN: 7.5 G/DL (ref 6.4–8.3)
TROPONIN, HIGH SENSITIVITY: 10 NG/L (ref 0–14)
TROPONIN, HIGH SENSITIVITY: 10 NG/L (ref 0–14)
TURBIDITY: CLEAR
URINE HGB: ABNORMAL
UROBILINOGEN, URINE: NORMAL
WBC # BLD: 9.3 K/UL (ref 3.5–11.3)
WBC UA: NORMAL /HPF (ref 0–5)

## 2022-07-07 PROCEDURE — 87804 INFLUENZA ASSAY W/OPTIC: CPT

## 2022-07-07 PROCEDURE — 99285 EMERGENCY DEPT VISIT HI MDM: CPT

## 2022-07-07 PROCEDURE — 85025 COMPLETE CBC W/AUTO DIFF WBC: CPT

## 2022-07-07 PROCEDURE — 96366 THER/PROPH/DIAG IV INF ADDON: CPT

## 2022-07-07 PROCEDURE — 96368 THER/DIAG CONCURRENT INF: CPT

## 2022-07-07 PROCEDURE — 83690 ASSAY OF LIPASE: CPT

## 2022-07-07 PROCEDURE — 84484 ASSAY OF TROPONIN QUANT: CPT

## 2022-07-07 PROCEDURE — 6360000002 HC RX W HCPCS: Performed by: STUDENT IN AN ORGANIZED HEALTH CARE EDUCATION/TRAINING PROGRAM

## 2022-07-07 PROCEDURE — 81001 URINALYSIS AUTO W/SCOPE: CPT

## 2022-07-07 PROCEDURE — 6360000002 HC RX W HCPCS: Performed by: EMERGENCY MEDICINE

## 2022-07-07 PROCEDURE — 87635 SARS-COV-2 COVID-19 AMP PRB: CPT

## 2022-07-07 PROCEDURE — 93005 ELECTROCARDIOGRAM TRACING: CPT | Performed by: STUDENT IN AN ORGANIZED HEALTH CARE EDUCATION/TRAINING PROGRAM

## 2022-07-07 PROCEDURE — 71045 X-RAY EXAM CHEST 1 VIEW: CPT

## 2022-07-07 PROCEDURE — 83735 ASSAY OF MAGNESIUM: CPT

## 2022-07-07 PROCEDURE — 96376 TX/PRO/DX INJ SAME DRUG ADON: CPT

## 2022-07-07 PROCEDURE — 96375 TX/PRO/DX INJ NEW DRUG ADDON: CPT

## 2022-07-07 PROCEDURE — 96361 HYDRATE IV INFUSION ADD-ON: CPT

## 2022-07-07 PROCEDURE — 2580000003 HC RX 258: Performed by: EMERGENCY MEDICINE

## 2022-07-07 PROCEDURE — 82010 KETONE BODYS QUAN: CPT

## 2022-07-07 PROCEDURE — 80053 COMPREHEN METABOLIC PANEL: CPT

## 2022-07-07 PROCEDURE — 96365 THER/PROPH/DIAG IV INF INIT: CPT

## 2022-07-07 PROCEDURE — 2580000003 HC RX 258: Performed by: STUDENT IN AN ORGANIZED HEALTH CARE EDUCATION/TRAINING PROGRAM

## 2022-07-07 RX ORDER — LORAZEPAM 2 MG/ML
1 INJECTION INTRAMUSCULAR ONCE
Status: COMPLETED | OUTPATIENT
Start: 2022-07-07 | End: 2022-07-07

## 2022-07-07 RX ORDER — ONDANSETRON 2 MG/ML
4 INJECTION INTRAMUSCULAR; INTRAVENOUS ONCE
Status: COMPLETED | OUTPATIENT
Start: 2022-07-07 | End: 2022-07-07

## 2022-07-07 RX ORDER — 0.9 % SODIUM CHLORIDE 0.9 %
1000 INTRAVENOUS SOLUTION INTRAVENOUS ONCE
Status: COMPLETED | OUTPATIENT
Start: 2022-07-07 | End: 2022-07-07

## 2022-07-07 RX ORDER — MORPHINE SULFATE 4 MG/ML
4 INJECTION, SOLUTION INTRAMUSCULAR; INTRAVENOUS ONCE
Status: COMPLETED | OUTPATIENT
Start: 2022-07-07 | End: 2022-07-07

## 2022-07-07 RX ORDER — MAGNESIUM SULFATE IN WATER 40 MG/ML
2000 INJECTION, SOLUTION INTRAVENOUS ONCE
Status: COMPLETED | OUTPATIENT
Start: 2022-07-07 | End: 2022-07-08

## 2022-07-07 RX ADMIN — LORAZEPAM 1 MG: 2 INJECTION INTRAMUSCULAR; INTRAVENOUS at 21:40

## 2022-07-07 RX ADMIN — MORPHINE SULFATE 4 MG: 4 INJECTION INTRAVENOUS at 20:19

## 2022-07-07 RX ADMIN — THIAMINE HYDROCHLORIDE 100 MG: 100 INJECTION, SOLUTION INTRAMUSCULAR; INTRAVENOUS at 22:29

## 2022-07-07 RX ADMIN — ONDANSETRON 4 MG: 2 INJECTION INTRAMUSCULAR; INTRAVENOUS at 20:19

## 2022-07-07 RX ADMIN — MAGNESIUM SULFATE HEPTAHYDRATE 2000 MG: 40 INJECTION, SOLUTION INTRAVENOUS at 22:28

## 2022-07-07 RX ADMIN — SODIUM CHLORIDE 1000 ML: 9 INJECTION, SOLUTION INTRAVENOUS at 20:19

## 2022-07-07 ASSESSMENT — PAIN SCALES - GENERAL: PAINLEVEL_OUTOF10: 10

## 2022-07-07 ASSESSMENT — PAIN DESCRIPTION - LOCATION: LOCATION: BACK;ABDOMEN

## 2022-07-08 LAB
EKG ATRIAL RATE: 73 BPM
EKG P AXIS: 44 DEGREES
EKG P-R INTERVAL: 168 MS
EKG Q-T INTERVAL: 420 MS
EKG QRS DURATION: 60 MS
EKG QTC CALCULATION (BAZETT): 462 MS
EKG R AXIS: -2 DEGREES
EKG T AXIS: 68 DEGREES
EKG VENTRICULAR RATE: 73 BPM

## 2022-07-08 PROCEDURE — 6360000002 HC RX W HCPCS: Performed by: STUDENT IN AN ORGANIZED HEALTH CARE EDUCATION/TRAINING PROGRAM

## 2022-07-08 PROCEDURE — 6370000000 HC RX 637 (ALT 250 FOR IP): Performed by: STUDENT IN AN ORGANIZED HEALTH CARE EDUCATION/TRAINING PROGRAM

## 2022-07-08 RX ORDER — MORPHINE SULFATE 4 MG/ML
4 INJECTION, SOLUTION INTRAMUSCULAR; INTRAVENOUS ONCE
Status: COMPLETED | OUTPATIENT
Start: 2022-07-08 | End: 2022-07-08

## 2022-07-08 RX ORDER — CYCLOBENZAPRINE HCL 5 MG
5 TABLET ORAL 2 TIMES DAILY PRN
Qty: 10 TABLET | Refills: 0 | Status: SHIPPED | OUTPATIENT
Start: 2022-07-08 | End: 2022-07-18

## 2022-07-08 RX ORDER — ONDANSETRON 4 MG/1
4 TABLET, ORALLY DISINTEGRATING ORAL EVERY 8 HOURS PRN
Qty: 15 TABLET | Refills: 0 | Status: SHIPPED | OUTPATIENT
Start: 2022-07-08 | End: 2022-08-02

## 2022-07-08 RX ADMIN — POTASSIUM BICARBONATE 40 MEQ: 782 TABLET, EFFERVESCENT ORAL at 01:18

## 2022-07-08 RX ADMIN — MORPHINE SULFATE 4 MG: 4 INJECTION INTRAVENOUS at 00:58

## 2022-07-08 ASSESSMENT — PAIN SCALES - GENERAL: PAINLEVEL_OUTOF10: 9

## 2022-07-08 NOTE — ED PROVIDER NOTES
[facility]  Emergency Department  Faculty Attestation     I performed a history and physical examination of the patient and discussed management with the resident. I reviewed the residents note and agree with the documented findings and plan of care. Any areas of disagreement are noted on the chart. I was personally present for the key portions of any procedures. I have documented in the chart those procedures where I was not present during the key portions. I have reviewed the emergency nurses triage note. I agree with the chief complaint, past medical history, past surgical history, allergies, medications, social and family history as documented unless otherwise noted below. For Physician Assistant/ Nurse Practitioner cases/documentation I have personally evaluated this patient and have completed at least one if not all key elements of the E/M (history, physical exam, and MDM). Additional findings are as noted.       Primary Care Physician:  Contreras Zuluaga MD    Screenings:  [unfilled]    CHIEF COMPLAINT       Chief Complaint   Patient presents with    Rectal Bleeding    Abdominal Pain    Headache       RECENT VITALS:   Temp: 98.8 °F (37.1 °C),  Heart Rate: 67, Resp: 20, BP: (!) 136/111    LABS:  Labs Reviewed   CBC WITH AUTO DIFFERENTIAL - Abnormal; Notable for the following components:       Result Value    RBC 5.16 (*)     MCV 76.9 (*)     MCHC 35.3 (*)     All other components within normal limits   COMPREHENSIVE METABOLIC PANEL - Abnormal; Notable for the following components:    Glucose 383 (*)     Sodium 131 (*)     Potassium 3.3 (*)     Chloride 91 (*)     All other components within normal limits   MAGNESIUM - Abnormal; Notable for the following components:    Magnesium 1.3 (*)     All other components within normal limits   BETA-HYDROXYBUTYRATE - Abnormal; Notable for the following components:    Beta-Hydroxybutyrate 0.92 (*)     All other components within normal limits   LIPASE   TROPONIN   TROPONIN       Radiology  XR CHEST PORTABLE    (Results Pending)       CRITICAL CARE: There was a high probability of clinically significant/life threatening deterioration in this patient's condition which required my urgent intervention. Total critical care time was none minutes. This excludes any time for separately reportable procedures. EKG:   EKG Interpretation    Interpreted by me    Rhythm: normal sinus   Rate: normal  Axis: normal  Ectopy: none  Conduction: normal  ST Segments: no acute change  T Waves: Flattening, possible inversion V3  Q Waves: none    Clinical Impression: Low voltage, nonspecific T wave change    Attending Physician Additional  Notes    Patient is been altered since this morning with multiple complaints including headache, chills, abdominal cramps, low back pain with radiation to her buttocks, vomiting yellow-green material, orange appearing stools, chills, no sore throat or dry mouth. She is had COVID previously. She does not drink on a regular basis but had tequila 2 days ago. She intermittently takes Klonopin but on a regular basis. No opioids. She feels like her muscles are cramping. She has history of type 2 diabetes, hypertension, gastroparesis, sleep apnea, glaucoma, prior venous thromboembolism, hypothyroidism, cannabinoid use, on diuretic. She has history of rectocele but no vaginal bleeding. No UTI symptoms. On exam she is tremulous, anxious, slightly hypertensive, afebrile. Negative [place], negative Trousseau. Lungs are clear. Abdomen soft and nontender. Normal motor strength. No tongue fasciculations. Mouth is dry. Impression is viral syndrome, possible dehydration, consider electrolyte abnormality. Plan is imaging, labs, fluids, antiemetics, anxiolytics, reassess. Graham Meredith.  Ellen Chavez MD, [address] [facility]  Attending Emergency  Physician               Kevin Barrett MD  07/07/22 1726       Kevin Barrett, MD  07/07/22 6425

## 2022-07-08 NOTE — PROGRESS NOTES
SPIRITUAL CARE DEPARTMENT - [address]  PROGRESS NOTE    Shift date: 07/07/2022  Shift day: Thursday   Shift # 2    Room # 17/17   Name: Brandi Eng                Jewish: Non Mu-ism   Place of Faith:     Referral: Rapid Response    Admit Date & Time: 7/7/2022  7:59 PM    Assessment:  Brandi Eng is a 47 y.o. female in the hospital because she has pain issues. Upon entering the room writer observes pt. Resting in bed, with dtr at bedside. Both are anxious and worried. Intervention:  Writer introduced self and title as  Writer offered space for patient and dtr  to express feelings, needs, and concerns and provided a ministry presence.  offers dtr refreshment and emotional support. Outcome:  Pt. Resting , dtr sharing feelings and concerns. Plan:  Chaplains will remain available to offer spiritual and emotional support as needed.       Electronically signed by Justyn Mukherjee on 7/7/2022 at 9:51 PM.  [facility]  [phone]

## 2022-07-08 NOTE — ED NOTES
Pt states she is about to have a bowel movement and asks for bedpan.  Pt given bedpan , no loose stool occurrence at this time      Milly Montgomery RN  07/07/22 2009

## 2022-07-08 NOTE — ED NOTES
Pt's linens wet with urine. Full bed change. External catheter in place.      Nell Moscoso RN  07/07/22 2901

## 2022-07-08 NOTE — PROGRESS NOTES
I signed up for this patient in error. I did not see this patient. I did not participate in the evlauation or treatment of this patient.     Electronically signed by Kaylyn Jennings DO on 7/7/2022 at 8:03 PM

## 2022-07-09 ASSESSMENT — ENCOUNTER SYMPTOMS
CONSTIPATION: 0
DIARRHEA: 0
PHOTOPHOBIA: 0
NAUSEA: 1
BLOOD IN STOOL: 1
SHORTNESS OF BREATH: 0
ABDOMINAL PAIN: 1
VOMITING: 1

## 2022-07-10 NOTE — ED PROVIDER NOTES
[facility]  ED  Emergency Department Encounter  Emergency Medicine Resident     Pt Name: Jann Quiroz  CZB:[id number]  [place] 1970  Date of evaluation: 7/7/22  PCP:  Juan Luis Curry MD    CHIEF COMPLAINT       Chief Complaint   Patient presents with    Rectal Bleeding    Abdominal Pain    Headache     HISTORY OF PRESENT ILLNESS  (Location/Symptom, Timing/Onset, Context/Setting, Quality, Duration, ModifyingFactors, Severity.)      Jann Quiroz is a 47 y.o. female with PMH of diabetes, hypertension, COPD who presents for evaluation of headache, nausea, vomiting, abdominal pain, myalgias for the past 2 days. Patient also complains of bright red-orange around stool concerning for blood. No numbness, weakness, changes in vision, chest pain, shortness of breath, urinary symptoms, vaginal bleeding/discharge. PAST MEDICAL / SURGICAL / SOCIAL /FAMILY HISTORY      has a past medical history of Arthritis, Back pain, Bipolar 1 disorder ([address] COPD (chronic obstructive pulmonary disease) ([address] Depression, Diabetes mellitus ([address] Fibromyalgia, GERD (gastroesophageal reflux disease), Glaucoma, Hx of blood clots, Hyperlipidemia, Hypertension, Lumbosacral spondylosis without myelopathy, Morbid obesity ([address] On home oxygen therapy, Pitting edema, PONV (postoperative nausea and vomiting), Renal stones, Sleep apnea, Thyroid disease, Urge incontinence, and Warts, genital.  No other pertinent PMH on review with patient/guardian. has a past surgical history that includes Tubal ligation; Cholecystectomy; hernia repair (05/02/2017); hernia repair (N/A, 5/2/2017); Nerve Block (10/10/2018); Nerve Block (10/23/2018); Upper gastrointestinal endoscopy (N/A, 5/21/2020); Colonoscopy (N/A, 5/21/2020); and Upper gastrointestinal endoscopy (N/A, 7/20/2020). No other pertinent PSH on review with patient/guardian.   Social History     Socioeconomic History    Marital status:      Spouse name: Not on file    Number of children: Not on file    Years of education: Not on file    Highest education level: Not on file   Occupational History    Not on file   Tobacco Use    Smoking status: Former Smoker     Years: 25.00    Smokeless tobacco: Never Used    Tobacco comment: quit in 2005   Vaping Use    Vaping Use: Some days   Substance and Sexual Activity    Alcohol use: Yes     Alcohol/week: 10.0 - 12.0 standard drinks     Types: 10 - 12 Cans of beer per week    Drug use: Yes     Types: Marijuana Uma Kugel)    Sexual activity: Yes     Partners: Male     Birth control/protection: Surgical     Comment: S/P tubal ligation   Other Topics Concern    Not on file   Social History Narrative    Not on file     Social Determinants of Health     Financial Resource Strain:     Difficulty of Paying Living Expenses: Not on file   Food Insecurity:     Worried About Running Out of Food in the Last Year: Not on file    Hung of Food in the Last Year: Not on file   Transportation Needs:     Lack of Transportation (Medical): Not on file    Lack of Transportation (Non-Medical):  Not on file   Physical Activity:     Days of Exercise per Week: Not on file    Minutes of Exercise per Session: Not on file   Stress:     Feeling of Stress : Not on file   Social Connections:     Frequency of Communication with Friends and Family: Not on file    Frequency of Social Gatherings with Friends and Family: Not on file    Attends [employer] Services: Not on file    Active Member of Clubs or Organizations: Not on file    Attends Club or Organization Meetings: Not on file    Marital Status: Not on file   Intimate Partner Violence:     Fear of Current or Ex-Partner: Not on file    Emotionally Abused: Not on file    Physically Abused: Not on file    Sexually Abused: Not on file   Housing Stability:     Unable to Pay for Housing in the Last Year: Not on file    Number of Places Lived in the Last Year: Not on file    Unstable Housing in the Last Year: Not on file       I counseled the patient against using tobacco products. Family History   Problem Relation Age of Onset    Hypertension Mother     Diabetes Mother     COPD Mother     Lung Cancer Father     Brain Cancer Father      No other pertinent FamHx on review with patient/guardian. Allergies:  Ioxaglate, Iv dye [iodides], Loratadine, Compazine spansule  [prochlorperazine], Hydrochlorothiazide, Iodine, Loratadine-pseudoephedrine er, Other, Prochlorperazine edisylate, Prochlorperazine maleate, and Pseudoephedrine    Home Medications:  Prior to Admission medications    Medication Sig Start Date End Date Taking? Authorizing Provider   ondansetron (ZOFRAN ODT) 4 MG disintegrating tablet Take 1 tablet by mouth every 8 hours as needed for Nausea 7/8/22  Yes Rocio De Leon DO   cyclobenzaprine (FLEXERIL) 5 MG tablet Take 1 tablet by mouth 2 times daily as needed for Muscle spasms 7/8/22 7/18/22 Yes Rocio De Leon DO   ibuprofen (ADVIL;MOTRIN) 800 MG tablet Take 1 tablet by mouth 2 times daily as needed for Pain 3/3/22   Mel Uribe DO   Incontinence Supply Disposable MISC Apply 6 diapers daily for mixed urinary incontinence.  11/4/21   ANDRES Johns CNP   benzonatate (TESSALON PERLES) 100 MG capsule Take 1 capsule by mouth 3 times daily as needed for Cough 4/27/21   Kim Hampton,    pyridoxine (B-6) 100 MG tablet Take 100 mg by mouth daily    Historical Provider, MD   Fluticasone furoate-vilanterol (BREO ELLIPTA) 200-25 MCG/INH AEPB inhaler Inhale 1 puff into the lungs 2 times daily 2/22/21   Historical Provider, MD   cyanocobalamin 1000 MCG tablet Take 1,000 mcg by mouth daily    Historical Provider, MD   MYRBETRIQ 50 MG TB24 Take 50 mg by mouth daily 9/23/20   ANDRES Zavala CNP   NONFORMULARY Place 1 drop into both eyes daily Indications: for dry eyes Optase eye drop    Historical Provider, MD   acetaminophen (APAP EXTRA STRENGTH) 500 MG tablet Take 2 tablets by mouth every 6 hours as needed for Pain  Patient not taking: Reported on 11/4/2021 6/6/20   Manolo Queen DO   tiotropium (SPIRIVA HANDIHALER) 18 MCG inhalation capsule Inhale 18 mcg into the lungs daily    Historical Provider, MD   esomeprazole (NEXIUM) 40 MG delayed release capsule Take 1 capsule by mouth 2 times daily 5/21/20 9/23/20  Gerri Moses MD   sucralfate (CARAFATE) 1 GM tablet Take 1 tablet by mouth 4 times daily 5/21/20 9/23/20  Gerri Moses MD   furosemide (LASIX) 20 MG tablet TAKE 1 TABLET DAILY 4/8/20   Nancy Palacios MD   omeprazole (PRILOSEC) 40 MG delayed release capsule TAKE 1 CAPSULE DAILY 2/28/20   Nancy Palacios MD   Benzocaine 15 MG LOZG Take 1 lozenge by mouth 3 times daily 11/22/19   ANDRES Lizama CNP   ibuprofen (ADVIL;MOTRIN) 600 MG tablet Take 1 tablet by mouth every 8 hours as needed for Pain 11/22/19   ANDRES Lizama CNP   potassium chloride (MICRO-K) 10 MEQ extended release capsule TAKE 1 CAPSULE DAILY 9/26/19   Nancy Palacios MD   traZODone (DESYREL) 100 MG tablet TAKE 1 TABLET BY MOUTH NIGHTLY 8/25/19   Nancy Palacios MD   gabapentin (NEURONTIN) 300 MG capsule TAKE 1 CAPSULE THREE TIMES A DAY 8/25/19 9/23/20  Nancy Palacios MD   atorvastatin (LIPITOR) 40 MG tablet TAKE 1 TABLET DAILY 7/21/19   Kvng Story MD   ASPIRIN ADULT LOW STRENGTH 81 MG EC tablet TAKE 1 TABLET DAILY 2/20/19   Saud Jackson MD   atenolol (TENORMIN) 50 MG tablet TAKE 1 TABLET DAILY 2/19/19   Saud Jackson MD   levothyroxine (SYNTHROID) 150 MCG tablet TAKE 1 TABLET DAILY 2/19/19   Saud Jackson MD   lisinopril (PRINIVIL;ZESTRIL) 40 MG tablet TAKE 1 TABLET DAILY 2/19/19   Saud Jackson MD   metFORMIN (GLUCOPHAGE) 1000 MG tablet TAKE 1 TABLET TWICE A DAY 2/19/19   Saud Jackson MD   ferrous sulfate 325 (65 Fe) MG EC tablet take 1 tablet by mouth twice a day 2/18/19   Saud Jackson MD   albuterol sulfate HFA (VENTOLIN HFA) 108 (90 Base) MCG/ACT inhaler Inhale 2 puffs into the lungs every 6 hours as needed for Wheezing  Patient not taking: Reported on 11/4/2021 1/9/19   Obi Waldron MD   ipratropium (ATROVENT HFA) 17 MCG/ACT inhaler Inhale 2 puffs into the lungs every 6 hours 1/9/19   Obi Waldron MD   nystatin (MYCOSTATIN) 801127 UNIT/GM powder Apply 3 times daily. 12/29/18   Archana Dean MD   meloxicam (MOBIC) 15 MG tablet TAKE 1 TABLET DAILY 12/19/18   Archana Dean MD   fluticasone [place]) 50 MCG/ACT nasal spray 1 spray by Each Nare route daily 12/3/18   Archana Dean MD   hydrOXYzine (VISTARIL) 25 MG capsule Take 25-75 mg by mouth    Historical Provider, MD   nitroGLYCERIN (NITROSTAT) 0.4 MG SL tablet TAKE 1 TABLET UNDER THE TONGUE EVERY 5 MINUTES AS NEEDED FOR CHESTPAIN 9/17/18   Chang Simental MD   DULoxetine (CYMBALTA) 30 MG extended release capsule Take 30 mg by mouth daily    Historical Provider, MD   COMFORT EZ PEN NEEDLES 32G X 6 MM MISC USE AS DIRECTED DAILY 8/15/18   Archana Dean MD   TRUE METRIX BLOOD GLUCOSE TEST strip USE AS DIRECTED DAILY AS NEEDED 12/21/17   Archana Dean MD   Lancets MISC Use to check sugars daily. 5/12/17   Lety Byers MD   LUMIGAN 0.01 % SOLN ophthalmic drops Place 1 drop into both eyes nightly  12/22/16   Historical Provider, MD   LATUDA 80 MG TABS tablet Take 80 mg by mouth daily  3/3/16   Historical Provider, MD   sertraline (ZOLOFT) 100 MG tablet Take 1 tablet by mouth nightly 2/26/16   Historical Provider, MD   OLANZapine (ZYPREXA) 10 MG tablet TAKE 1 TABLET AT BEDTIME. 1/8/15   Edson Holloway MD       REVIEW OF SYSTEMS    (2-9 systems for level 4, 10 ormore for level 5)      Review of Systems   Constitutional: Negative for chills and fever. Eyes: Negative for photophobia and visual disturbance. Respiratory: Negative for shortness of breath. Cardiovascular: Negative for chest pain.    Gastrointestinal: Positive for abdominal pain, blood in stool, nausea and vomiting. Negative for constipation and diarrhea. Genitourinary: Negative for dysuria, frequency, urgency, vaginal bleeding and vaginal discharge. Musculoskeletal: Positive for myalgias. Negative for neck pain and neck stiffness. Skin: Negative for rash. Allergic/Immunologic: Negative for immunocompromised state. Neurological: Positive for headaches. Negative for dizziness, weakness and numbness. Hematological: Does not bruise/bleed easily. PHYSICAL EXAM   (up to 7 for level 4, 8 or more for level 5)      INITIAL VITALS:   BP (!) 127/102   Pulse 79   Temp 98.8 °F (37.1 °C)   Resp 14   Wt 226 lb (102.5 kg)   SpO2 97%   BMI 38.79 kg/m²     Physical Exam  Constitutional:       General: She is in acute distress. Appearance: Normal appearance. She is not ill-appearing, toxic-appearing or diaphoretic. HENT:      Head: Normocephalic and atraumatic. Right Ear: External ear normal.      Left Ear: External ear normal.   Eyes:      General:         Right eye: No discharge. Left eye: No discharge. Cardiovascular:      Rate and Rhythm: Normal rate and regular rhythm. Pulses: Normal pulses. Heart sounds: No murmur heard. Pulmonary:      Effort: Pulmonary effort is normal. No respiratory distress. Breath sounds: Normal breath sounds. No wheezing, rhonchi or rales. Abdominal:      Palpations: Abdomen is soft. Tenderness: There is abdominal tenderness (Diffuse). There is no right CVA tenderness, left CVA tenderness, guarding or rebound. Genitourinary:     Comments: External hemorrhoids. Guaiac negative. Skin:     Capillary Refill: Capillary refill takes less than 2 seconds. Neurological:      General: No focal deficit present. Mental Status: She is alert.         DIFFERENTIAL  DIAGNOSIS     PLAN (LABS / IMAGING / EKG):  Orders Placed This Encounter   Procedures    COVID-19, Rapid    RAPID INFLUENZA A/B ANTIGENS    XR CHEST PORTABLE    CBC with Auto Differential    Comprehensive Metabolic Panel    Lipase    Magnesium    Troponin    Beta-Hydroxybutyrate    Urinalysis with Reflex to Culture    Microscopic Urinalysis    EKG 12 Lead       MEDICATIONS ORDERED:  Orders Placed This Encounter   Medications    morphine injection 4 mg    ondansetron (ZOFRAN) injection 4 mg    0.9 % sodium chloride bolus    LORazepam (ATIVAN) injection 1 mg    DISCONTD: thiamine (B-1) 100 mg in sodium chloride 0.9 % 100 mL IVPB    magnesium sulfate 2000 mg in 50 mL IVPB premix    DISCONTD: potassium bicarb-citric acid (EFFER-K) effervescent tablet 40 mEq    morphine injection 4 mg    potassium bicarb-citric acid (EFFER-K) effervescent tablet 40 mEq    ondansetron (ZOFRAN ODT) 4 MG disintegrating tablet     Sig: Take 1 tablet by mouth every 8 hours as needed for Nausea     Dispense:  15 tablet     Refill:  0    cyclobenzaprine (FLEXERIL) 5 MG tablet     Sig: Take 1 tablet by mouth 2 times daily as needed for Muscle spasms     Dispense:  10 tablet     Refill:  0     DIAGNOSTIC RESULTS / EMERGENCY DEPARTMENT COURSE / MDM     LABS:  Results for orders placed or performed during the hospital encounter of 07/07/22   COVID-19, Rapid    Specimen: Nasopharyngeal Swab   Result Value Ref Range    Specimen Description . NASOPHARYNGEAL SWAB     SARS-CoV-2, Rapid Not Detected Not Detected   RAPID INFLUENZA A/B ANTIGENS    Specimen: Nasopharyngeal   Result Value Ref Range    Flu A Antigen NEGATIVE NEGATIVE    Flu B Antigen NEGATIVE NEGATIVE   CBC with Auto Differential   Result Value Ref Range    WBC 9.3 3.5 - 11.3 k/uL    RBC 5.16 (H) 3.95 - 5.11 m/uL    Hemoglobin 14.0 11.9 - 15.1 g/dL    Hematocrit 39.7 36.3 - 47.1 %    MCV 76.9 (L) 82.6 - 102.9 fL    MCH 27.1 25.2 - 33.5 pg    MCHC 35.3 (H) 28.4 - 34.8 g/dL    RDW 11.8 11.8 - 14.4 %    Platelets 030 225 - 483 k/uL    MPV 9.8 8.1 - 13.5 fL    NRBC Automated 0.0 0.0 per 100 WBC    Seg Neutrophils 54 36 - 65 %    Lymphocytes 37 24 - 43 %    Monocytes 6 3 - 12 %    Eosinophils % 2 1 - 4 %    Basophils 1 0 - 2 %    Immature Granulocytes 0 0 %    Segs Absolute 5.12 1.50 - 8.10 k/uL    Absolute Lymph # 3.40 1.10 - 3.70 k/uL    Absolute Mono # 0.54 0.10 - 1.20 k/uL    Absolute Eos # 0.16 0.00 - 0.44 k/uL    Basophils Absolute 0.06 0.00 - 0.20 k/uL    Absolute Immature Granulocyte <0.03 0.00 - 0.30 k/uL    RBC Morphology MICROCYTOSIS PRESENT    Comprehensive Metabolic Panel   Result Value Ref Range    Glucose 383 (H) 70 - 99 mg/dL    BUN 9 6 - 20 mg/dL    CREATININE 0.77 0.50 - 0.90 mg/dL    Calcium 9.4 8.6 - 10.4 mg/dL    Sodium 131 (L) 135 - 144 mmol/L    Potassium 3.3 (L) 3.7 - 5.3 mmol/L    Chloride 91 (L) 98 - 107 mmol/L    CO2 23 20 - 31 mmol/L    Anion Gap 17 9 - 17 mmol/L    Alkaline Phosphatase 99 35 - 104 U/L    ALT 16 5 - 33 U/L    AST 24 <32 U/L    Total Bilirubin 0.69 0.3 - 1.2 mg/dL    Total Protein 7.5 6.4 - 8.3 g/dL    Albumin 4.4 3.5 - 5.2 g/dL    Albumin/Globulin Ratio 1.4 1.0 - 2.5    GFR Non-African American >60 >60 mL/min    GFR African American >60 >60 mL/min    GFR Comment         Lipase   Result Value Ref Range    Lipase 29 13 - 60 U/L   Magnesium   Result Value Ref Range    Magnesium 1.3 (L) 1.6 - 2.6 mg/dL   Troponin   Result Value Ref Range    Troponin, High Sensitivity 10 0 - 14 ng/L   Troponin   Result Value Ref Range    Troponin, High Sensitivity 10 0 - 14 ng/L   Beta-Hydroxybutyrate   Result Value Ref Range    Beta-Hydroxybutyrate 0.92 (H) 0.02 - 0.27 mmol/L   Urinalysis with Reflex to Culture    Specimen: Urine   Result Value Ref Range    Color, UA Yellow Yellow    Turbidity UA Clear Clear    Glucose, Ur 3+ (A) NEGATIVE    Bilirubin Urine NEGATIVE NEGATIVE    Ketones, Urine SMALL (A) NEGATIVE    Specific Gravity, UA 1.011 1.005 - 1.030    Urine Hgb TRACE (A) NEGATIVE    pH, UA 7.5 5.0 - 8.0    Protein, UA NEGATIVE NEGATIVE    Urobilinogen, Urine Normal Normal    Nitrite, Urine NEGATIVE NEGATIVE Leukocyte Esterase, Urine NEGATIVE NEGATIVE   Microscopic Urinalysis   Result Value Ref Range    -          WBC, UA 5 TO 10 0 - 5 /HPF    RBC, UA 0 TO 2 0 - 4 /HPF    Casts UA  0 - 8 /LPF     0 TO 2 HYALINE Reference range defined for non-centrifuged specimen. Epithelial Cells UA 2 TO 5 0 - 5 /HPF   EKG 12 Lead   Result Value Ref Range    Ventricular Rate 73 BPM    Atrial Rate 73 BPM    P-R Interval 168 ms    QRS Duration 60 ms    Q-T Interval 420 ms    QTc Calculation (Bazett) 462 ms    P Axis 44 degrees    R Axis -2 degrees    T Axis 68 degrees       IMPRESSION/MDM/ED COURSE:  47 y.o. female presented with vomiting, abdominal pain, myalgias. Patient hypertensive at 143/100. Afebrile vitals otherwise unremarkable. On exam patient peers uncomfortable but nontoxic. Heart RRR, lungs clear. Diffuse mild abdominal tenderness that resolved with distractibility. Patient does have external hemorrhoids, guaiac negative. Will obtain abdominal pain lab work including DKA labs. COVID/influenza swab. Symptomatic treatment with morphine and Zofran. I did not order a pregnancy test because patient is postmenopausal.    ED Course as of 07/09/22 2233 Fri Jul 08, 2022   0025 RAPID INFLUENZA A/B ANTIGENS:    Flu A Antigen NEGATIVE   Flu B Antigen NEGATIVE [AF]   0025 COVID-19, Rapid:    Specimen Description . NASOPHARYNGEAL SWAB   SARS-CoV-2, Rapid Not Detected [AF]   0025 Troponin:    Troponin, High Sensitivity 10 [AF]   0025 Microscopic Urinalysis:    -        WBC, UA 5 TO 10   RBC, UA 0 TO 2   Casts UA 0 TO 2 HYALINE Reference range defined for non-centrifuged specimen.    Epithelial Cells, UA 2 TO 5 [AF]   0025 Urinalysis with Reflex to Culture(!):    Color, UA Yellow   Turbidity UA Clear   Glucose, UA 3+(!)   Bilirubin, Urine NEGATIVE   Ketones, Urine SMALL(!)   Specific [place], UA 1.011   Urine Hgb TRACE(!)   pH, UA 7.5   Protein, UA NEGATIVE   Urobilinogen, Urine Normal   Nitrite, Urine NEGATIVE   Leukocyte Esterase, Urine NEGATIVE [AF]   0025 Beta-Hydroxybutyrate(!):    Beta-Hydroxybutyrate 0.92(!) [AF]   0025 Magnesium(!):    Magnesium 1.3(!) [AF]   0025 Comprehensive Metabolic Panel(!):    GLUCOSE, FASTING,(!)   BUN,BUNPL 9   Creatinine 0.77   CALCIUM, SERUM, 986542 9.4   Sodium 131(!)   Potassium 3.3(!)   Chloride 91(!)   CO2 23   Anion Gap 17   Alk Phos 99   ALT 16   AST 24   Bilirubin 0.69   Total Protein 7.5   Albumin 4.4   ALBUMIN/GLOBULIN RATIO 1.4   GFR Non- >60   GFR  >60   GFR Comment      [AF]   0025 CBC with Auto Differential(!):    WBC 9.3   RBC 5.16(!)   Hemoglobin Quant 14.0   Hematocrit 39.7   MCV 76.9(!)   MCH 27.1   MCHC 35.3(!)   RDW 11.8   Platelet Count 696   MPV 9.8   NRBC Automated 0.0   Seg Neutrophils 54   Lymphocytes 37   Monocytes 6   Eosinophils % 2   Basophils 1   Immature Granulocytes 0   Segs Absolute 5.12   Absolute Lymph # 3.40   Absolute Mono # 0.54   Absolute Eos # 0.16   Basophils Absolute 0.06   Absolute Immature Granulocyte <0.03   RBC Morphology MICROCYTOSIS PRESENT [AF]      ED Course User Index  [AF] Bhavya Sykes, DO     Electrolytes repleted. Glucose elevated but at baseline for patient. No DKA. Patient states that she has insulin at home but just has not been taking it. States that she will begin taking it. Patient has close follow-up with PCP appointment tomorrow morning. Will discharge with symptomatic treatment and reevaluation by PCP tomorrow. I discussed signs and symptoms that would require reevaluation in the ED. The patient expressed understanding and agreement with plan. All questions answered. RADIOLOGY:  XR CHEST PORTABLE   Final Result   No acute abnormality. EKG  Normal sinus rhythm. Left axis deviation. Q waves inferiorly. No ST elevation or depression. No T wave changes. QTc 462.     All EKG's are interpreted by the Emergency Department Physician who either signs or Co-signs this chart in the absence of a cardiologist.    PROCEDURES:  None    CONSULTS:  None    FINAL IMPRESSION      1. Hyperglycemia    2. Hypomagnesemia    3.  Hypokalemia          DISPOSITION / PLAN     DISPOSITION Decision To Discharge 07/08/2022 01:17:25 AM      PATIENT REFERREDTO:  Nicky Javier MD  [address] [place] [address] [id number]    In 1 day        DISCHARGE MEDICATIONS:  Discharge Medication List as of 7/8/2022  1:26 AM          Meera Forbes DO  PGY 3  Resident Physician Emergency Medicine  07/09/22 10:33 PM    (Please note that portions of this note were completed with a voice recognition program.Efforts were made to edit the dictations but occasionally words are mis-transcribed.)       Janell Sapp DO  Resident  07/09/22 [id number]

## 2022-08-02 ENCOUNTER — OFFICE VISIT (OUTPATIENT)
Dept: OBGYN CLINIC | Age: 52
End: 2022-08-02
Payer: MEDICARE

## 2022-08-02 ENCOUNTER — HOSPITAL ENCOUNTER (OUTPATIENT)
Age: 52
Setting detail: SPECIMEN
Discharge: HOME | End: 2022-08-02

## 2022-08-02 VITALS — BODY MASS INDEX: 38.79 KG/M2 | HEIGHT: 64 IN | DIASTOLIC BLOOD PRESSURE: 76 MMHG | SYSTOLIC BLOOD PRESSURE: 118 MMHG

## 2022-08-02 DIAGNOSIS — R15.9 FULL INCONTINENCE OF FECES: Primary | ICD-10-CM

## 2022-08-02 DIAGNOSIS — N39.3 SUI (STRESS URINARY INCONTINENCE, FEMALE): ICD-10-CM

## 2022-08-02 DIAGNOSIS — N39.3 STRESS INCONTINENCE OF URINE: ICD-10-CM

## 2022-08-02 DIAGNOSIS — Z00.00 PREVENTATIVE HEALTH CARE: ICD-10-CM

## 2022-08-02 DIAGNOSIS — Z79.4 TYPE 2 DIABETES MELLITUS WITH HYPERGLYCEMIA, WITH LONG-TERM CURRENT USE OF INSULIN (HCC): ICD-10-CM

## 2022-08-02 DIAGNOSIS — T81.49XA ABDOMINAL WALL ABSCESS AT SITE OF SURGICAL WOUND: ICD-10-CM

## 2022-08-02 DIAGNOSIS — L02.211 ABDOMINAL WALL ABSCESS: ICD-10-CM

## 2022-08-02 DIAGNOSIS — R15.1 FECAL SMEARING: ICD-10-CM

## 2022-08-02 DIAGNOSIS — Z11.3 SCREEN FOR STD (SEXUALLY TRANSMITTED DISEASE): ICD-10-CM

## 2022-08-02 DIAGNOSIS — E11.65 TYPE 2 DIABETES MELLITUS WITH HYPERGLYCEMIA, WITH LONG-TERM CURRENT USE OF INSULIN (HCC): ICD-10-CM

## 2022-08-02 DIAGNOSIS — Z12.31 ENCOUNTER FOR SCREENING MAMMOGRAM FOR MALIGNANT NEOPLASM OF BREAST: ICD-10-CM

## 2022-08-02 DIAGNOSIS — N95.0 PMB (POSTMENOPAUSAL BLEEDING): Primary | ICD-10-CM

## 2022-08-02 DIAGNOSIS — N81.6 BADEN-WALKER GRADE 1 RECTOCELE: ICD-10-CM

## 2022-08-02 PROCEDURE — 3046F HEMOGLOBIN A1C LEVEL >9.0%: CPT | Performed by: OBSTETRICS & GYNECOLOGY

## 2022-08-02 PROCEDURE — 99204 OFFICE O/P NEW MOD 45 MIN: CPT | Performed by: OBSTETRICS & GYNECOLOGY

## 2022-08-02 PROCEDURE — 2022F DILAT RTA XM EVC RTNOPTHY: CPT | Performed by: OBSTETRICS & GYNECOLOGY

## 2022-08-02 PROCEDURE — 1036F TOBACCO NON-USER: CPT | Performed by: OBSTETRICS & GYNECOLOGY

## 2022-08-02 PROCEDURE — G8427 DOCREV CUR MEDS BY ELIG CLIN: HCPCS | Performed by: OBSTETRICS & GYNECOLOGY

## 2022-08-02 PROCEDURE — G8417 CALC BMI ABV UP PARAM F/U: HCPCS | Performed by: OBSTETRICS & GYNECOLOGY

## 2022-08-02 PROCEDURE — 3017F COLORECTAL CA SCREEN DOC REV: CPT | Performed by: OBSTETRICS & GYNECOLOGY

## 2022-08-02 RX ORDER — HYDROXYZINE PAMOATE 50 MG/1
CAPSULE ORAL
COMMUNITY
Start: 2022-07-07

## 2022-08-02 RX ORDER — MIRABEGRON 25 MG/1
TABLET, FILM COATED, EXTENDED RELEASE ORAL
COMMUNITY
Start: 2022-07-13

## 2022-08-02 RX ORDER — GABAPENTIN 400 MG/1
CAPSULE ORAL
COMMUNITY
Start: 2022-07-06

## 2022-08-02 RX ORDER — PANTOPRAZOLE SODIUM 40 MG/1
TABLET, DELAYED RELEASE ORAL
COMMUNITY
Start: 2022-06-21

## 2022-08-02 RX ORDER — MONTELUKAST SODIUM 10 MG/1
TABLET ORAL
COMMUNITY
Start: 2022-07-13

## 2022-08-02 RX ORDER — DULOXETIN HYDROCHLORIDE 60 MG/1
CAPSULE, DELAYED RELEASE ORAL
COMMUNITY
Start: 2022-07-07

## 2022-08-02 RX ORDER — CLONAZEPAM 0.5 MG/1
TABLET ORAL
COMMUNITY
Start: 2022-07-07

## 2022-08-02 NOTE — PROGRESS NOTES
[address] [place], [place] [address]e.., [address] [facility] [address]  [place], [address] [place] [address] Highway [phone]   Fax [phone]    Ana Smallwood  2021              47 y.o. Chief Complaint   Patient presents with    Established New Doctor    Other     Discuss prolapse       No LMP recorded. Patient is perimenopausal.             Primary Care Physician: Dolly Rodríguez MD    The patient was seen and examined. She has no chief complaint today and is here for her annual exam.  Her bowels are regular. There are no voiding complaints. She denies any bloating. She denies vaginal discharge and was counseled on STD's and the need for barrier contraception. HPI : Ana Smallwood is a 47 y.o. female [id number]    Pt with hx of PMB ~30 dAYS AGO FOR 1 DY. NOT SEXUALLY ACTIVE HAS AN UNFAITHFUL PARTNER AND REQUESTING std WORKUP. SHE IS KNOWN dm-UNCONTROLLED AND IS A VERY POOR HISTORIAN. SHE HAS A KNOWN ABDOMINAL WOUND INFECTION PCP IS MANAGING AND WAS ON ABX BUT IS REQUESTING WOUND CARE. SHE IS IN NEED OF MAMMOGRAM IMAGING-ORDERED. SHE STATES INCONTINENCE OF URINE AND STOOL. POSSIBLE DM COMPONENT VS OTHER REFERRALS TO COLORECTAL AND UROLOGY DONE. UA ORDERED HYGIENE REVIEWED.   no Bloating  no Early Satiety  no Unexplained weight change of more than 15 lbs  yes  PMB  no  PCB  ________________________________________________________________________  OB History    Para Term  AB Living   6 4 4 0 2 4   SAB IAB Ectopic Molar Multiple Live Births   2 0 0 0 0 4      # Outcome Date GA Lbr Salvador/2nd Weight Sex Delivery Anes PTL Lv   6 Term 03 40w0d   M Vag-Spont   LUISA      Name: Neel Cooney   5 Term 02 40w0d   F Vag-Spont None  LUISA      Name: Mikaela Acevedo   4 Term 00 40w0d   F Vag-Spont None  LUISA      Name: Sol Stern   3 Term 99 40w0d  7 lb 7 oz (3.374 kg) F Vag-Spont   LUISA      Name: Sierra Bound   2 1995        ND   1 1993     Past Medical History:   Diagnosis Date    Arthritis     Back pain 10/29/2015    Bipolar 1 disorder (HCC)     COPD (chronic obstructive pulmonary disease) (HCC)     Depression     Diabetes mellitus (HCC)     Fibromyalgia     GERD (gastroesophageal reflux disease)     Glaucoma     Hx of blood clots     DVT  (20yrs ago)    Hyperlipidemia     Hypertension     Lumbosacral spondylosis without myelopathy     Morbid obesity ([address] 10/12/2015    On home oxygen therapy     2 liters into the cpap machine    Pitting edema     PONV (postoperative nausea and vomiting)     Renal stones     history of renal stones    Sleep apnea     cpap    Thyroid disease     Urge incontinence 1/9/2019    Warts, genital                                                                    Past Surgical History:   Procedure Laterality Date    CHOLECYSTECTOMY      COLONOSCOPY N/A 5/21/2020    COLONOSCOPY POLYPECTOMY SNARE/COLD BIOPSY performed by Lidia Rosales MD at [address]  05/02/2017    HERNIA REPAIR N/A 5/2/2017    HERNIA INCISIONAL REPAIR LAPAROSCOPIC ROBOTIC , lysis of adhensions performed by Wilbur Méndez MD at [place] [address]  10/10/2018    camacho mbnb #1 marcaine xylocaine,    NERVE BLOCK  10/23/2018    camacho si #1 No steroid    TUBAL LIGATION      UPPER GASTROINTESTINAL ENDOSCOPY N/A 5/21/2020    EGD BIOPSY performed by Lidia Rosales MD at [address] [facility] N/A 7/20/2020    EGD BIOPSY AND PHOTOS performed by Lidia Rosales MD at [facility]     Family History   Problem Relation Age of Onset    Hypertension Mother     Diabetes Mother     COPD Mother     Lung Cancer Father     Brain Cancer Father      Social History     Socioeconomic History    Marital status:      Spouse name: Not on file    Number of children: Not on file    Years of education: Not on file    Highest education level: Not on file   Occupational History    Not on file   Tobacco Use    Smoking status: Former     Years: 25.00     Types: Cigarettes    Smokeless tobacco: Never Tobacco comments:     quit in 2005   Vaping Use    Vaping Use: Some days   Substance and Sexual Activity    Alcohol use: Yes     Alcohol/week: 10.0 - 12.0 standard drinks     Types: 10 - 12 Cans of beer per week    Drug use: Yes     Types: Marijuana Champ Cue)    Sexual activity: Yes     Partners: Male     Birth control/protection: Surgical     Comment: S/P tubal ligation   Other Topics Concern    Not on file   Social History Narrative    Not on file     Social Determinants of Health     Financial Resource Strain: Not on file   Food Insecurity: Not on file   Transportation Needs: Not on file   Physical Activity: Not on file   Stress: Not on file   Social Connections: Not on file   Intimate Partner Violence: Not on file   Housing Stability: Not on file       MEDICATIONS:  Current Outpatient Medications   Medication Sig Dispense Refill    MYRBETRIQ 25 MG TB24       hydrOXYzine pamoate (VISTARIL) 50 MG capsule       pantoprazole (PROTONIX) 40 MG tablet       montelukast (SINGULAIR) 10 MG tablet       gabapentin (NEURONTIN) 400 MG capsule       DULoxetine (CYMBALTA) 60 MG extended release capsule       clonazePAM (KLONOPIN) 0.5 MG tablet       Incontinence Supply Disposable MISC Apply 6 diapers daily for mixed urinary incontinence.  180 each 5    furosemide (LASIX) 20 MG tablet TAKE 1 TABLET DAILY 60 tablet 2    potassium chloride (MICRO-K) 10 MEQ extended release capsule TAKE 1 CAPSULE DAILY 28 capsule 2    traZODone (DESYREL) 100 MG tablet TAKE 1 TABLET BY MOUTH NIGHTLY 30 tablet 0    atorvastatin (LIPITOR) 40 MG tablet TAKE 1 TABLET DAILY 30 tablet 5    ASPIRIN ADULT LOW STRENGTH 81 MG EC tablet TAKE 1 TABLET DAILY 30 tablet 5    atenolol (TENORMIN) 50 MG tablet TAKE 1 TABLET DAILY 30 tablet 5    levothyroxine (SYNTHROID) 150 MCG tablet TAKE 1 TABLET DAILY 30 tablet 5    lisinopril (PRINIVIL;ZESTRIL) 40 MG tablet TAKE 1 TABLET DAILY 30 tablet 5    sertraline (ZOLOFT) 100 MG tablet Take 1 tablet by mouth nightly       No current facility-administered medications for this visit. ALLERGIES:  Allergies as of 08/02/2022 - Fully Reviewed 08/02/2022   Allergen Reaction Noted    Ioxaglate Anaphylaxis 11/03/2016    Iv dye [iodides] Anaphylaxis 03/14/2014    Loratadine  11/01/2013    Compazine spansule  [prochlorperazine]  06/26/2020    Hydrochlorothiazide  04/01/2008    Iodine Other (See Comments) 03/17/2017    Loratadine-pseudoephedrine er Other (See Comments) 01/05/2012    Other  03/17/2017    Prochlorperazine edisylate Other (See Comments) 11/01/2013    Prochlorperazine maleate  11/04/2021    Pseudoephedrine  08/01/2013         Immunization status: stated as current, but no records available. Gynecologic History:       No LMP recorded. Patient is perimenopausal.    Sexually Active: No    STD History: No         Genetic Qualified Family History of Breast, Ovarian , Colon or Uterine Cancer: No     If YES see scanned worksheet. Preventative Health Testing:    Health Maintenance:  Health Maintenance Due   Topic Date Due    COVID-19 Vaccine (1) Never done    Depression Monitoring  Never done    Hepatitis C screen  Never done    Hepatitis B vaccine (1 of 3 - Risk 3-dose series) Never done    Pneumococcal 0-64 years Vaccine (2 - PCV) 10/12/2016    Diabetic retinal exam  12/08/2016    Diabetic microalbuminuria test  08/15/2019    Diabetic foot exam  04/18/2020    Shingles vaccine (1 of 2) Never done    Breast cancer screen  01/24/2021    Lipids  04/21/2022       Date of Last Pap Smear: >3yrs  Abnormal Pap Smear History: denies  Colposcopy History:   Date of Last Mammogram: ordered  Date of Last Colonoscopy: 2 years ses GI  Date of Last Bone Density: NA      ________________________________________________________________________        REVIEW OF SYSTEMS:       A minimum of an eleven point review of systems was completed. Review Of Systems (11 point):  Constitutional: No fever, chills or malaise;  No weight change or fatigue  Head and Eyes: No vision changes, Headache, Dizziness or trauma in last 12 months  ENT ROS: No hearing, Tinnitis, sinus or taste problems  Hematological and Lymphatic ROS:No Lymphoma, Von Willebrand's, Hemophillia or Bleeding History  Psych ROS: No Depression, Homicidal thoughts,suicidal thoughts, or anxiety  Breast ROS: No breast abnormalities or lumps  Respiratory ROS: No SOB, Pneumoniae,Cough, or Pulmonary Embolism   Cardiovascular ROS: No Chest Pain with Exertion, Palpitations, Syncope, Edema, Arrhythmia  Gastrointestinal ROS: No Indigestion, Heartburn, Nausea, vomiting, Diarrhea, Constipation,or Bowel Changes; No Bloody Stools or melena; + fecal incontinence  Genito-Urinary ROS: No Dysuria, Hematuria or Nocturia. No Urinary Incontinence or Vaginal Discharge; + VIC  Musculoskeletal ROS: No Arthralgia, Arthritis,Gout,Osteoporosis or Rheumatism  Neurological ROS: No CVA, Migraines, Epilepsy, Seizure Hx, or Limb Weakness  Dermatological ROS: No Rash, Itching, Hives, Mole Changes or Cancer; + abdominal Wound abscess   GYN ROS: +PMB ; Rectocele grade 1                                                                                                                                                                                                                             PHYSICAL Exam:     Constitutional:  Vitals:    08/02/22 1005   BP: 118/76   Site: Left Upper Arm   Position: Sitting   Cuff Size: Medium Adult   Height: 5' 4\" (1.626 m)       Chaperone for Intimate Exam  Chaperone was offered and accepted as part of the rooming process. Chaperone: Oriana          General Appearance: This  is a well Developed, well Nourished, well groomed female. Her BMI was reviewed. Nutritional decision making was discussed. Skin:  There was a Normal Inspection of the skin without rashes or lesions. There were no rashes.   (Papular, Maculopapular, Hives, Pustular, Macular)     There were no lesions (Ulcers, Erythema, Abn. Appearing Nevi)    + Abdominal wall wound abscess right of midline under pannus ~2 cm. Care through PCP-Rec Wound Care referral-No erythema or drainage. Pt using H2O2 and neosporin          Lymphatic:  No Lymph Nodes were Palpable in the neck , axilla or groin.  0 # Of Lymph Nodes; Location ; Character [Normal]  [Shotty] [Tender] [Enlarged]     Neck and EENT:  The neck was supple. There were no masses   The thyroid was not enlarged and had no masses. Perrla, EOMI B/L, TMI B/L No Abnormalities. Throat inspected-No exudates or Masses, Nares Patent No Masses        Respiratory: The lungs were auscultated and found to be clear. There were no rales, rhonchi or wheezes. There was a good respiratory effort. Cardiovascular: The heart was in a regular rate and rhythm. . No S3 or S4. There was no murmur appreciated. Location, grade, and radiation are not applicable. Extremities: The patients extremities were without calf tenderness, edema, or varicosities. There was full range of motion in all four extremities. Pulses in all four extremities were appreciated and are 2/4. Abdomen: The abdomen was soft and non-tender. There were good bowel sounds in all quadrants and there was no guarding, rebound or rigidity. On evaluation there was no evidence of hepatosplenomegaly and there was no costal vertebral urbano tenderness bilaterally. No hernias were appreciated. + Abdominal wall wound abscess right of midline under pannus ~2 cm. Care through PCP-Rec Wound Care referral-No erythema or drainage. Pt using H2O2 and neosporin      Psych: The patient had a normal Orientation to: Time, Place, Person, and Situation  There is no Mood / Affect changes    Breast:  (Chest)  Declined obtaining mammogram  Self breast exams were reviewed in detail. Literature was given.     Pelvic Exam:  External genitalia: hair loss and fat pad loss  Urinary system: urethral meatus normal and smooth nt bladder  Vaginal: atrophic mucosa and vaginal vault, rectocele grade 1  Cervix: normal appearance and thin prep PAP obtained  Adnexa: normal bimanual exam and non palpable  Uterus: normal single, nontender and mid-position    Rectal Exam:  exam declined by patient          Musculosk:  Normal Gait and station was noted. Digits were evaluated without abnormal findings. Range of motion, stability and strength were evaluated and found to be appropriate for the patients age. ASSESSMENT:      47 y.o. Diagnosis Orders   1. PMB (postmenopausal bleeding)  PAP SMEAR    US NON OB TRANSVAGINAL    US PELVIS COMPLETE    CBC with Auto Differential    TSH with Reflex    HCG, Quantitative, Pregnancy    Follicle Stimulating Hormone    Estradiol      2. [place]-Walker grade 1 rectocele        3. VIC (stress urinary incontinence, female)  Urinalysis with Reflex to Culture      4. Fecal smearing        5. Screen for STD (sexually transmitted disease)  Vaginitis DNA Probe    C.trachomatis N.gonorrhoeae DNA    Hepatitis B Surface Antigen    Hepatitis C Antibody    HERPES PROFILE    HIV Screen    T. pallidum Ab      6. Encounter for screening mammogram for malignant neoplasm of breast  FLOWER DIGITAL SCREEN W OR WO CAD BILATERAL      7. Abdominal wall abscess right lateral under pannus        8. Type 2 diabetes mellitus with hyperglycemia, with long-term current use of insulin (HCC)        9.  Preventative health care               Chief Complaint   Patient presents with    Established New Doctor    Other     Discuss prolapse          Past Medical History:   Diagnosis Date    Arthritis     Back pain 10/29/2015    Bipolar 1 disorder (HCC)     COPD (chronic obstructive pulmonary disease) ([address]     Depression     Diabetes mellitus (HCC)     Fibromyalgia     GERD (gastroesophageal reflux disease)     Glaucoma     Hx of blood clots     DVT  (20yrs ago)    Hyperlipidemia     Hypertension     Lumbosacral spondylosis without myelopathy     Morbid obesity ([address] 10/12/2015 On home oxygen therapy     2 liters into the cpap machine    Pitting edema     PONV (postoperative nausea and vomiting)     Renal stones     history of renal stones    Sleep apnea     cpap    Thyroid disease     Urge incontinence 1/9/2019    Warts, genital          Patient Active Problem List    Diagnosis Date Noted    Abdominal wall abscess right lateral under pannus 08/02/2022     Priority: Medium    Fecal smearing 08/02/2022     Priority: Medium    VIC (stress urinary incontinence, female) 08/02/2022     Priority: Medium    [place]-Walker grade 1 rectocele 08/02/2022     Priority: Medium    PMB (postmenopausal bleeding) 08/02/2022     Priority: Medium    Major depressive disorder, recurrent severe without psychotic features ([address]     Dizziness 05/24/2021    Bipolar disord, crnt episode manic w/o psych features, unsp ([address] 07/30/2019    Cannabis dependence, episodic abuse ([address] 07/30/2019    Urge incontinence 01/09/2019    Chronic sacroiliac joint pain 10/23/2018    Morbid obesity with BMI of 45.0-49.9, adult ([address] 09/25/2018    Chronic prescription benzodiazepine use 09/25/2018    S/P laparoscopic hernia repair 05/03/2017    Lumbosacral spondylosis without myelopathy 05/19/2016    Lower extremity edema 10/29/2015    Morbid obesity ([address] 10/12/2015    Hypertension 03/14/2014    Diabetes mellitus-  insulin depedent type 2 03/14/2014    Dyslipidemia 03/14/2014    COPD (chronic obstructive pulmonary disease) ([address] 03/14/2014    DELGADO on CPAP 03/14/2014    Arthritis 03/14/2014    Bipolar disorder ([address] 03/14/2014    Hypothyroid 03/14/2014    Hemorrhoids 03/14/2014          Hereditary Breast, Ovarian, Colon and Uterine Cancer screening Done. Tobacco & Secondary smoke risks reviewed; instructed on cessation and avoidance      Counseling Completed:  Preventative Health Recommendations and Follow up. PLAN:  Return in about 3 weeks (around 8/23/2022) for FU Labs/Diagnostics and Care Plan.   See below for orders and imaging    REFERRALS:  Colorectal-incontinence fecal  Urology-Incontinence Urine  Wound care-abdominal wall abscess  Fu pcp dm management    STD blood and cultures at pt request unfaithful partner  PAP collected  Ultrasound and labs  FU 2-3 weeks for fu and possible Emb hscope vs schedule with clearance for D&C hscope OR-PMB DX  Repeat Annual every 1 year  Cervical Cytology Evaluation begins at 24years old. If Negative Cytology, Follow-up screening per current guidelines. Mammograms every 1 year. If 37 yo and last mammogram was negative. Calcium and Vitamin D dosing reviewed. Colonoscopy screening reviewed as well as onset for bone density testing. Barrier recommendations discussed. STD counseling and prevention reviewed. Routine health maintenance per patients PCP. Orders Placed This Encounter   Procedures    Vaginitis DNA Probe     Standing Status:   Future     Standing Expiration Date:   8/2/2023    C.trachomatis N.gonorrhoeae DNA     Standing Status:   Future     Standing Expiration Date:   8/2/2023    FLOWER DIGITAL SCREEN W OR WO CAD BILATERAL     Standing Status:   Future     Standing Expiration Date:   10/2/2023     Order Specific Question:   Reason for exam:     Answer:   screening mammogram    US NON OB TRANSVAGINAL     Standing Status:   Future     Standing Expiration Date:   1/29/2023    US PELVIS COMPLETE     This procedure can be scheduled via [employer]. Access your [employer] account by visiting [email or web address].      Standing Status:   Future     Standing Expiration Date:   8/2/2023    Hepatitis B Surface Antigen     Standing Status:   Future     Standing Expiration Date:   8/2/2023    Hepatitis C Antibody     Standing Status:   Future     Standing Expiration Date:   8/2/2023    HERPES PROFILE     Standing Status:   Future     Standing Expiration Date:   9/1/2022    HIV Screen     Standing Status:   Future     Standing Expiration Date:   8/2/2023    T. pallidum Ab     Standing Status: Future     Standing Expiration Date:   9/1/2022    PAP SMEAR     Patient History:    No LMP recorded. Patient is perimenopausal.  OBGYN Status: Perimenopausal  Past Surgical History:  No date: CHOLECYSTECTOMY  5/21/2020: COLONOSCOPY; N/A      Comment:  COLONOSCOPY POLYPECTOMY SNARE/COLD BIOPSY performed by                Magalys Smith MD at [place] [address]  05/02/2017: HERNIA REPAIR  5/2/2017: HERNIA REPAIR; N/A      Comment:  HERNIA INCISIONAL REPAIR LAPAROSCOPIC ROBOTIC , lysis of               adhensions performed by Halie Brown MD at [address] [place] [address]  10/10/2018: NERVE BLOCK      Comment:  camacho mbnb #1 marcaine xylocaine,  10/23/2018: NERVE BLOCK      Comment:  camacho si #1 No steroid  No date: TUBAL LIGATION  5/21/2020: UPPER GASTROINTESTINAL ENDOSCOPY; N/A      Comment:  EGD BIOPSY performed by Magalys Smith MD at [facility]  7/20/2020: UPPER GASTROINTESTINAL ENDOSCOPY; N/A      Comment:  EGD BIOPSY AND PHOTOS performed by Magalys Smith MD                at [address] [place] [address] History    Tobacco Use      Smoking status: Former        Years: 25.00        Types: Cigarettes      Smokeless tobacco: Never      Tobacco comments: quit in 2005       Standing Status:   Future     Standing Expiration Date:   8/2/2023     Order Specific Question:   Collection Type     Answer: Thin Prep     Order Specific Question:   Prior Abnormal Pap Test     Answer:   No     Order Specific Question:   Screening or Diagnostic     Answer:   Screening     Order Specific Question:   HPV Requested?      Answer:   Yes     Order Specific Question:   High Risk Patient     Answer:   N/A    CBC with Auto Differential     Standing Status:   Future     Standing Expiration Date:   8/2/2023    TSH with Reflex     Standing Status:   Future     Standing Expiration Date:   8/2/2023    HCG, Quantitative, Pregnancy     Standing Status:   Future     Standing Expiration Date:   2/6/7781    Follicle Stimulating Hormone     Standing Status:   Future     Standing

## 2022-08-03 LAB
C TRACH DNA GENITAL QL NAA+PROBE: NEGATIVE
CANDIDA SPECIES, DNA PROBE: NEGATIVE
GARDNERELLA VAGINALIS, DNA PROBE: NEGATIVE
N. GONORRHOEAE DNA: NEGATIVE
SOURCE: NORMAL
SPECIMEN DESCRIPTION: NORMAL
TRICHOMONAS VAGINALIS DNA: NEGATIVE

## 2022-08-04 LAB
HPV SAMPLE: NORMAL
HPV, GENOTYPE 16: NOT DETECTED
HPV, GENOTYPE 18: NOT DETECTED
HPV, HIGH RISK OTHER: NOT DETECTED
HPV, INTERPRETATION: NORMAL
SPECIMEN DESCRIPTION: NORMAL

## 2022-08-05 ENCOUNTER — HOSPITAL ENCOUNTER (OUTPATIENT)
Dept: WOUND CARE | Age: 52
Discharge: HOME | End: 2022-08-05
Payer: MEDICARE

## 2022-08-05 VITALS
HEIGHT: 64 IN | DIASTOLIC BLOOD PRESSURE: 54 MMHG | BODY MASS INDEX: 34.66 KG/M2 | SYSTOLIC BLOOD PRESSURE: 134 MMHG | WEIGHT: 203 LBS | RESPIRATION RATE: 18 BRPM | HEART RATE: 73 BPM | TEMPERATURE: 97.7 F

## 2022-08-05 DIAGNOSIS — E11.65 TYPE 2 DIABETES MELLITUS WITH HYPERGLYCEMIA, WITH LONG-TERM CURRENT USE OF INSULIN (HCC): ICD-10-CM

## 2022-08-05 DIAGNOSIS — E66.01 MORBID OBESITY (HCC): ICD-10-CM

## 2022-08-05 DIAGNOSIS — Z79.4 TYPE 2 DIABETES MELLITUS WITH HYPERGLYCEMIA, WITH LONG-TERM CURRENT USE OF INSULIN (HCC): ICD-10-CM

## 2022-08-05 DIAGNOSIS — S31.109D OPEN WOUND OF ABDOMINAL WALL, SUBSEQUENT ENCOUNTER: Primary | ICD-10-CM

## 2022-08-05 PROBLEM — S31.109A OPEN ABDOMINAL WALL WOUND: Status: ACTIVE | Noted: 2022-08-05

## 2022-08-05 PROCEDURE — 11042 DBRDMT SUBQ TIS 1ST 20SQCM/<: CPT | Performed by: NURSE PRACTITIONER

## 2022-08-05 PROCEDURE — 11042 DBRDMT SUBQ TIS 1ST 20SQCM/<: CPT

## 2022-08-05 PROCEDURE — 99213 OFFICE O/P EST LOW 20 MIN: CPT

## 2022-08-05 PROCEDURE — 99203 OFFICE O/P NEW LOW 30 MIN: CPT | Performed by: NURSE PRACTITIONER

## 2022-08-05 RX ORDER — LIDOCAINE HYDROCHLORIDE 40 MG/ML
SOLUTION TOPICAL ONCE
OUTPATIENT
Start: 2022-08-05 | End: 2022-08-05

## 2022-08-05 RX ORDER — LIDOCAINE HYDROCHLORIDE 20 MG/ML
JELLY TOPICAL ONCE
Status: CANCELLED | OUTPATIENT
Start: 2022-08-05 | End: 2022-08-05

## 2022-08-05 RX ORDER — LIDOCAINE HYDROCHLORIDE 40 MG/ML
SOLUTION TOPICAL ONCE
Status: COMPLETED | OUTPATIENT
Start: 2022-08-05 | End: 2022-08-05

## 2022-08-05 RX ORDER — LIDOCAINE HYDROCHLORIDE 20 MG/ML
JELLY TOPICAL ONCE
OUTPATIENT
Start: 2022-08-05 | End: 2022-08-05

## 2022-08-05 RX ADMIN — LIDOCAINE HYDROCHLORIDE 10 ML: 40 SOLUTION TOPICAL at 11:06

## 2022-08-05 ASSESSMENT — PAIN DESCRIPTION - DESCRIPTORS: DESCRIPTORS: BURNING;THROBBING

## 2022-08-05 ASSESSMENT — PAIN DESCRIPTION - PAIN TYPE: TYPE: ACUTE PAIN

## 2022-08-05 ASSESSMENT — PAIN DESCRIPTION - LOCATION: LOCATION: ABDOMEN

## 2022-08-05 ASSESSMENT — PAIN - FUNCTIONAL ASSESSMENT: PAIN_FUNCTIONAL_ASSESSMENT: ACTIVITIES ARE NOT PREVENTED

## 2022-08-05 ASSESSMENT — PAIN SCALES - GENERAL: PAINLEVEL_OUTOF10: 7

## 2022-08-05 ASSESSMENT — PAIN DESCRIPTION - ONSET: ONSET: ON-GOING

## 2022-08-05 ASSESSMENT — PAIN DESCRIPTION - FREQUENCY: FREQUENCY: INTERMITTENT

## 2022-08-05 NOTE — PROGRESS NOTES
[address]   Progress Note and Procedure Note      Floridalma Kline  MEDICAL RECORD NUMBER:  [id number]  AGE: 47 y.o. GENDER: female  : 1970  EPISODE DATE:  2022    Subjective:     Chief Complaint   Patient presents with    Wound Check     abdomen         HISTORY of PRESENT ILLNESS HPI     Floridalma Kline is a 47 y.o. female who presents today for wound/ulcer evaluation. History of Wound Context: presents to wound clinic today for evaluation of right lower abdominal wound under pannus. Currently washing with hydrogen peroxide and keeping covered with toilet paper. Advised to stop hydrogen peroxide.    Wound/Ulcer Pain Timing/Severity: none  Quality of pain: N/A  Severity:  0 / 10   Modifying Factors: None  Associated Signs/Symptoms: none    Ulcer Identification:  Ulcer Type: non-healing/non-surgical  Contributing Factors: diabetes, poor glucose control, decreased mobility, and obesity    Wound: N/A        PAST MEDICAL HISTORY        Diagnosis Date    Arthritis     Back pain 10/29/2015    Bipolar 1 disorder (HCC)     COPD (chronic obstructive pulmonary disease) (HCC)     Depression     Diabetes mellitus (HCC)     Fibromyalgia     GERD (gastroesophageal reflux disease)     Glaucoma     Hx of blood clots     DVT  (20yrs ago)    Hyperlipidemia     Hypertension     Lumbosacral spondylosis without myelopathy     Morbid obesity ([address] 10/12/2015    On home oxygen therapy     2 liters into the cpap machine    Open abdominal wall wound 2022    Pitting edema     PONV (postoperative nausea and vomiting)     Renal stones     history of renal stones    Sleep apnea     cpap    Thyroid disease     Urge incontinence 2019    Warts, genital        PAST SURGICAL HISTORY    Past Surgical History:   Procedure Laterality Date    CHOLECYSTECTOMY      COLONOSCOPY N/A 2020    COLONOSCOPY POLYPECTOMY SNARE/COLD BIOPSY performed by Den Suárez MD at [facility] 05/02/2017    HERNIA REPAIR N/A 5/2/2017    HERNIA INCISIONAL REPAIR LAPAROSCOPIC ROBOTIC , lysis of adhensions performed by Antony Charles MD at [address]  10/10/2018    camacho mbnb #1 marcaine xylocaine,    NERVE BLOCK  10/23/2018    camacho si #1 No steroid    TUBAL LIGATION      UPPER GASTROINTESTINAL ENDOSCOPY N/A 5/21/2020    EGD BIOPSY performed by Kenji Guevara MD at [facility] N/A 7/20/2020    EGD BIOPSY AND PHOTOS performed by Kenji Guevara MD at [address] [place] [address]    Family History   Problem Relation Age of Onset    Hypertension Mother     Diabetes Mother     COPD Mother     Lung Cancer Father     Brain Cancer Father        SOCIAL HISTORY    Social History     Tobacco Use    Smoking status: Former     Years: 25.00     Types: Cigarettes    Smokeless tobacco: Never    Tobacco comments:     quit in 2005   Vaping Use    Vaping Use: Never used   Substance Use Topics    Alcohol use:  Yes     Alcohol/week: 10.0 - 12.0 standard drinks     Types: 10 - 12 Cans of beer per week    Drug use: Yes     Types: Marijuana (Weed)       ALLERGIES    Allergies   Allergen Reactions    Ioxaglate Anaphylaxis    Iv Dye [Iodides] Anaphylaxis    Loratadine      Legs jump     Compazine Spansule  [Prochlorperazine]      Other reaction(s): Notes: muscle spasms    Hydrochlorothiazide      Other reaction(s): Unknown    Iodine Other (See Comments)     Other reaction(s): Unknown    Loratadine-Pseudoephedrine Er Other (See Comments)     Other reaction(s): Notes: leg spasms    Other     Prochlorperazine Edisylate Other (See Comments)     Compazine  Compazine    Prochlorperazine Maleate     Pseudoephedrine        MEDICATIONS    Current Outpatient Medications on File Prior to Encounter   Medication Sig Dispense Refill    MYRBETRIQ 25 MG TB24       hydrOXYzine pamoate (VISTARIL) 50 MG capsule       pantoprazole (PROTONIX) 40 MG tablet       montelukast (SINGULAIR) 10 MG tablet gabapentin (NEURONTIN) 400 MG capsule       DULoxetine (CYMBALTA) 60 MG extended release capsule       clonazePAM (KLONOPIN) 0.5 MG tablet       Incontinence Supply Disposable MISC Apply 6 diapers daily for mixed urinary incontinence. 180 each 5    furosemide (LASIX) 20 MG tablet TAKE 1 TABLET DAILY 60 tablet 2    potassium chloride (MICRO-K) 10 MEQ extended release capsule TAKE 1 CAPSULE DAILY 28 capsule 2    traZODone (DESYREL) 100 MG tablet TAKE 1 TABLET BY MOUTH NIGHTLY 30 tablet 0    atorvastatin (LIPITOR) 40 MG tablet TAKE 1 TABLET DAILY 30 tablet 5    ASPIRIN ADULT LOW STRENGTH 81 MG EC tablet TAKE 1 TABLET DAILY 30 tablet 5    atenolol (TENORMIN) 50 MG tablet TAKE 1 TABLET DAILY 30 tablet 5    levothyroxine (SYNTHROID) 150 MCG tablet TAKE 1 TABLET DAILY 30 tablet 5    lisinopril (PRINIVIL;ZESTRIL) 40 MG tablet TAKE 1 TABLET DAILY 30 tablet 5    sertraline (ZOLOFT) 100 MG tablet Take 1 tablet by mouth nightly       No current facility-administered medications on file prior to encounter.        REVIEW OF SYSTEMS    Constitutional: negative  Eyes: negative  Ears, nose, mouth, throat, and face: negative  Respiratory: negative  Cardiovascular: negative  Integument/breast: negative except for abdominal wound   Hematologic/lymphatic: negative  Musculoskeletal:negative  Neurological: negative  Behavioral/Psych: negative except for marijuana use  Endocrine: negative  Allergic/Immunologic: negative    Objective:      BP (!) 134/54   Pulse 73   Temp 97.7 °F (36.5 °C) (Tympanic)   Resp 18   Ht 5' 4\" (1.626 m)   Wt 203 lb (92.1 kg)   BMI 34.84 kg/m²     Wt Readings from Last 3 Encounters:   08/05/22 203 lb (92.1 kg)   07/07/22 226 lb (102.5 kg)   06/21/22 228 lb (103.4 kg)       PHYSICAL EXAM    General Appearance: alert and oriented to person, place and time, well developed and obese, in no acute distress  Skin: warm and dry, no rash or erythema, abdominal wound   Head: normocephalic and atraumatic  Eyes: pupils Lower;Medial;Right #1 (Active)   Wound Image   08/05/22 1032   Dressing Status Old drainage noted;New drainage noted 08/05/22 1032   Wound Cleansed Cleansed with saline 08/05/22 1032   Wound Length (cm) 0.4 cm 08/05/22 1032   Wound Width (cm) 1.2 cm 08/05/22 1032   Wound Depth (cm) 0.2 cm 08/05/22 1032   Wound Surface Area (cm^2) 0.48 cm^2 08/05/22 1032   Wound Volume (cm^3) 0.096 cm^3 08/05/22 1032   Post-Procedure Length (cm) 0.4 cm 08/05/22 1032   Post-Procedure Width (cm) 1.2 cm 08/05/22 1032   Post-Procedure Depth (cm) 0.2 cm 08/05/22 1032   Post-Procedure Surface Area (cm^2) 0.48 cm^2 08/05/22 1032   Post-Procedure Volume (cm^3) 0.096 cm^3 08/05/22 1032   Wound Assessment Pink/red 08/05/22 1032   Drainage Amount Moderate 08/05/22 1032   Drainage Description Serosanguinous 08/05/22 1032   Odor None 08/05/22 1032   Vianney-wound Assessment Blanchable erythema 08/05/22 1032   Margins Defined edges 08/05/22 1032   Wound Thickness Description not for Pressure Injury Full thickness 08/05/22 1032   Number of days: 0          Percent of Wound(s)/Ulcer(s) Debrided: 100%    Total Surface Area Debrided:  0.48 sq cm     Diabetic/Pressure/Non Pressure Ulcers only:  Ulcer: Non-Pressure ulcer, fat layer exposed    Estimated Blood Loss:  Minimal    Hemostasis Achieved:  by pressure    Procedural Pain:  0  / 10     Post Procedural Pain:  0 / 10     Response to treatment:  Well tolerated by patient. Plan:     Treatment Note please see Discharge Instructions    Written patient dismissal instructions given to patient and signed by patient or POA.          Electronically signed by ANDRES Power CNP on 8/5/2022 at 11:06 AM

## 2022-08-05 NOTE — PLAN OF CARE
Problem: Chronic Conditions and Co-morbidities  Goal: Patient's chronic conditions and co-morbidity symptoms are monitored and maintained or improved  Outcome: Progressing     Problem: Discharge Planning  Goal: Discharge to home or other facility with appropriate resources  Outcome: Progressing     Problem: Pain  Goal: Verbalizes/displays adequate comfort level or baseline comfort level  Outcome: Progressing     Problem: Safety - Adult  Goal: Free from fall injury  Outcome: Progressing     Problem: Falls - Risk of:  Goal: Will remain free from falls  Description: Will remain free from falls  Outcome: Progressing

## 2022-08-05 NOTE — DISCHARGE INSTRUCTIONS
reviewed with me, the patient and/or responsible adult, by my health care provider(s). I had the opportunity to ask questions regarding this information.  I have elected to receive;      []After Visit Summary  [x]Comprehensive Discharge Instruction      Patient signature______________________________________Date:________  Electronically signed by Kina Burr RN on 8/5/2022 at 10:59 AM  Electronically signed by ANDRES Walters CNP on 8/5/2022 at 10:55 AM

## 2022-08-08 LAB — CYTOLOGY REPORT: NORMAL

## 2022-08-14 NOTE — DISCHARGE INSTRUCTIONS
[address]                                 Visit  Discharge Instructions / Physician Orders  [address] Care:None     SUPPLIES ORDERED THRU:    NONE                 DATE LAST SUPPLIED     Wound Location:  Lower abd     Cleanse with: Liquid antibacterial soap and water, rinse well      Dressing Orders:  Primary dressing    Jacque     Secondary dressing    silicone border dressing                       secure with           x 30days    bandaides for sensitive skin (green box)  at [facility]  Frequency:  Daily     Additional Orders: Increase protein to diet (meat, cheese, eggs, fish, peanut butter, nuts and beans)  Multivitamin daily     OFFLOADING [] YES  TYPE:                  [] NA     Weekly wound care visits until determined otherwise. Antibiotic therapy-wound care related YES [] NO [] NA[]     MY CHART []     Smart Device  []      HYPERBARIC TREATMENT-                TREATMENT #                          Your next appointment with the [address] [place] [address] is in 2 weeks                                                                                                   (Please note your next appointment above and if you are unable to keep, kindly give a 24 hour notice. Thank you.)  If more than 15 min late we cannot guarantee you will be seen due to clinician schedule  Per Policy, Excessive cancellation will call for dismissal from program.  If you experience any of the following, please call the [address] [place] [address] during business hours:  [phone]     * Increase in Pain  * Temperature over 101  * Increase in drainage from your wound  * Drainage with a foul odor  * Bleeding  * Increase in swelling  * Need for compression bandage changes due to slippage, breakthrough drainage. If you need medical attention outside of the business hours of the [address] [place] [address] please contact your PCP or go to the nearest emergency room.      The information contained in the After Visit Summary has been reviewed with me, the patient and/or responsible adult, by my health care provider(s). I had the opportunity to ask questions regarding this information.  I have elected to receive;      []After Visit Summary  [x]Comprehensive Discharge Instruction        Patient signature______________________________________Date:________

## 2022-08-15 ENCOUNTER — HOSPITAL ENCOUNTER (OUTPATIENT)
Dept: WOUND CARE | Age: 52
Discharge: HOME | End: 2022-08-15

## 2022-08-15 ENCOUNTER — FOLLOWUP TELEPHONE ENCOUNTER (OUTPATIENT)
Dept: WOUND CARE | Age: 52
End: 2022-08-15

## 2022-08-15 NOTE — PROGRESS NOTES
Patient no called no showed for [facility] wound care apt today. Called pt, she forgot about apt. Pt agreeable to rescheduling but needed to be as early in the morning as possible due to transportation on 8/22/22. Scheduled for 0830.

## 2022-11-03 ENCOUNTER — HOSPITAL ENCOUNTER (EMERGENCY)
Age: 52
Discharge: HOME | End: 2022-11-04
Attending: EMERGENCY MEDICINE
Payer: MEDICARE

## 2022-11-03 DIAGNOSIS — U07.1 COVID-19: Primary | ICD-10-CM

## 2022-11-03 LAB
ABSOLUTE EOS #: 0.1 K/UL (ref 0–0.44)
ABSOLUTE IMMATURE GRANULOCYTE: 0.03 K/UL (ref 0–0.3)
ABSOLUTE LYMPH #: 1.13 K/UL (ref 1.1–3.7)
ABSOLUTE MONO #: 0.55 K/UL (ref 0.1–1.2)
ANION GAP SERPL CALCULATED.3IONS-SCNC: 13 MMOL/L (ref 9–17)
BASOPHILS # BLD: 0 % (ref 0–2)
BASOPHILS ABSOLUTE: 0.03 K/UL (ref 0–0.2)
BUN BLDV-MCNC: 6 MG/DL (ref 6–20)
CALCIUM SERPL-MCNC: 9.2 MG/DL (ref 8.6–10.4)
CHLORIDE BLD-SCNC: 95 MMOL/L (ref 98–107)
CO2: 24 MMOL/L (ref 20–31)
CREAT SERPL-MCNC: 0.57 MG/DL (ref 0.5–0.9)
EOSINOPHILS RELATIVE PERCENT: 1 % (ref 1–4)
GFR SERPL CREATININE-BSD FRML MDRD: >60 ML/MIN/1.73M2
GLUCOSE BLD-MCNC: 183 MG/DL (ref 70–99)
HCT VFR BLD CALC: 36.1 % (ref 36.3–47.1)
HEMOGLOBIN: 12.3 G/DL (ref 11.9–15.1)
IMMATURE GRANULOCYTES: 0 %
LYMPHOCYTES # BLD: 16 % (ref 24–43)
MAGNESIUM: 1.4 MG/DL (ref 1.6–2.6)
MCH RBC QN AUTO: 26.1 PG (ref 25.2–33.5)
MCHC RBC AUTO-ENTMCNC: 34.1 G/DL (ref 28.4–34.8)
MCV RBC AUTO: 76.5 FL (ref 82.6–102.9)
MONOCYTES # BLD: 8 % (ref 3–12)
NRBC AUTOMATED: 0 PER 100 WBC
PDW BLD-RTO: 12.4 % (ref 11.8–14.4)
PLATELET # BLD: 304 K/UL (ref 138–453)
PMV BLD AUTO: 9.4 FL (ref 8.1–13.5)
POTASSIUM SERPL-SCNC: 3.8 MMOL/L (ref 3.7–5.3)
RBC # BLD: 4.72 M/UL (ref 3.95–5.11)
RBC # BLD: ABNORMAL 10*6/UL
SEG NEUTROPHILS: 75 % (ref 36–65)
SEGMENTED NEUTROPHILS ABSOLUTE COUNT: 5.22 K/UL (ref 1.5–8.1)
SODIUM BLD-SCNC: 132 MMOL/L (ref 135–144)
TROPONIN, HIGH SENSITIVITY: 7 NG/L (ref 0–14)
WBC # BLD: 7.1 K/UL (ref 3.5–11.3)

## 2022-11-03 PROCEDURE — 87804 INFLUENZA ASSAY W/OPTIC: CPT

## 2022-11-03 PROCEDURE — 99284 EMERGENCY DEPT VISIT MOD MDM: CPT

## 2022-11-03 PROCEDURE — 80048 BASIC METABOLIC PNL TOTAL CA: CPT

## 2022-11-03 PROCEDURE — 87635 SARS-COV-2 COVID-19 AMP PRB: CPT

## 2022-11-03 PROCEDURE — 93005 ELECTROCARDIOGRAM TRACING: CPT | Performed by: STUDENT IN AN ORGANIZED HEALTH CARE EDUCATION/TRAINING PROGRAM

## 2022-11-03 PROCEDURE — 96365 THER/PROPH/DIAG IV INF INIT: CPT

## 2022-11-03 PROCEDURE — 6360000002 HC RX W HCPCS: Performed by: STUDENT IN AN ORGANIZED HEALTH CARE EDUCATION/TRAINING PROGRAM

## 2022-11-03 PROCEDURE — 96375 TX/PRO/DX INJ NEW DRUG ADDON: CPT

## 2022-11-03 PROCEDURE — 2580000003 HC RX 258: Performed by: STUDENT IN AN ORGANIZED HEALTH CARE EDUCATION/TRAINING PROGRAM

## 2022-11-03 PROCEDURE — 84484 ASSAY OF TROPONIN QUANT: CPT

## 2022-11-03 PROCEDURE — 6370000000 HC RX 637 (ALT 250 FOR IP): Performed by: STUDENT IN AN ORGANIZED HEALTH CARE EDUCATION/TRAINING PROGRAM

## 2022-11-03 PROCEDURE — 83735 ASSAY OF MAGNESIUM: CPT

## 2022-11-03 PROCEDURE — 85025 COMPLETE CBC W/AUTO DIFF WBC: CPT

## 2022-11-03 RX ORDER — MAGNESIUM SULFATE IN WATER 40 MG/ML
2000 INJECTION, SOLUTION INTRAVENOUS ONCE
Status: COMPLETED | OUTPATIENT
Start: 2022-11-03 | End: 2022-11-03

## 2022-11-03 RX ORDER — MECLIZINE HCL 12.5 MG/1
25 TABLET ORAL ONCE
Status: COMPLETED | OUTPATIENT
Start: 2022-11-03 | End: 2022-11-03

## 2022-11-03 RX ORDER — DIAZEPAM 2 MG/1
2 TABLET ORAL ONCE
Status: DISCONTINUED | OUTPATIENT
Start: 2022-11-04 | End: 2022-11-03

## 2022-11-03 RX ORDER — SCOLOPAMINE TRANSDERMAL SYSTEM 1 MG/1
1 PATCH, EXTENDED RELEASE TRANSDERMAL ONCE
Status: DISCONTINUED | OUTPATIENT
Start: 2022-11-04 | End: 2022-11-04 | Stop reason: HOSPADM

## 2022-11-03 RX ORDER — 0.9 % SODIUM CHLORIDE 0.9 %
1000 INTRAVENOUS SOLUTION INTRAVENOUS ONCE
Status: COMPLETED | OUTPATIENT
Start: 2022-11-03 | End: 2022-11-03

## 2022-11-03 RX ORDER — IBUPROFEN 400 MG/1
400 TABLET ORAL ONCE
Status: COMPLETED | OUTPATIENT
Start: 2022-11-03 | End: 2022-11-03

## 2022-11-03 RX ORDER — DIAZEPAM 5 MG/1
5 TABLET ORAL ONCE
Status: DISCONTINUED | OUTPATIENT
Start: 2022-11-04 | End: 2022-11-03

## 2022-11-03 RX ORDER — ACETAMINOPHEN 500 MG
1000 TABLET ORAL ONCE
Status: COMPLETED | OUTPATIENT
Start: 2022-11-03 | End: 2022-11-03

## 2022-11-03 RX ORDER — ONDANSETRON 2 MG/ML
4 INJECTION INTRAMUSCULAR; INTRAVENOUS ONCE
Status: COMPLETED | OUTPATIENT
Start: 2022-11-03 | End: 2022-11-03

## 2022-11-03 RX ADMIN — SODIUM CHLORIDE 1000 ML: 9 INJECTION, SOLUTION INTRAVENOUS at 21:07

## 2022-11-03 RX ADMIN — MAGNESIUM SULFATE HEPTAHYDRATE 2000 MG: 40 INJECTION, SOLUTION INTRAVENOUS at 21:47

## 2022-11-03 RX ADMIN — ONDANSETRON 4 MG: 2 INJECTION INTRAMUSCULAR; INTRAVENOUS at 21:05

## 2022-11-03 RX ADMIN — ACETAMINOPHEN 1000 MG: 500 TABLET ORAL at 23:49

## 2022-11-03 RX ADMIN — IBUPROFEN 400 MG: 400 TABLET, FILM COATED ORAL at 23:49

## 2022-11-03 RX ADMIN — MECLIZINE 25 MG: 12.5 TABLET ORAL at 21:05

## 2022-11-03 ASSESSMENT — PAIN - FUNCTIONAL ASSESSMENT: PAIN_FUNCTIONAL_ASSESSMENT: 0-10

## 2022-11-03 ASSESSMENT — PAIN SCALES - GENERAL: PAINLEVEL_OUTOF10: 8

## 2022-11-04 VITALS
WEIGHT: 200 LBS | OXYGEN SATURATION: 94 % | DIASTOLIC BLOOD PRESSURE: 85 MMHG | TEMPERATURE: 99.1 F | HEART RATE: 77 BPM | SYSTOLIC BLOOD PRESSURE: 131 MMHG | BODY MASS INDEX: 34.33 KG/M2 | RESPIRATION RATE: 10 BRPM

## 2022-11-04 LAB
EKG ATRIAL RATE: 78 BPM
EKG P AXIS: 17 DEGREES
EKG P-R INTERVAL: 160 MS
EKG Q-T INTERVAL: 376 MS
EKG QRS DURATION: 50 MS
EKG QTC CALCULATION (BAZETT): 428 MS
EKG R AXIS: -13 DEGREES
EKG T AXIS: 78 DEGREES
EKG VENTRICULAR RATE: 78 BPM
FLU A ANTIGEN: NEGATIVE
FLU B ANTIGEN: NEGATIVE
SARS-COV-2, RAPID: DETECTED
SPECIMEN DESCRIPTION: ABNORMAL

## 2022-11-04 PROCEDURE — 93010 ELECTROCARDIOGRAM REPORT: CPT | Performed by: INTERNAL MEDICINE

## 2022-11-04 PROCEDURE — 6370000000 HC RX 637 (ALT 250 FOR IP): Performed by: STUDENT IN AN ORGANIZED HEALTH CARE EDUCATION/TRAINING PROGRAM

## 2022-11-04 RX ORDER — BENZONATATE 100 MG/1
100 CAPSULE ORAL 3 TIMES DAILY PRN
Qty: 21 CAPSULE | Refills: 0 | Status: SHIPPED | OUTPATIENT
Start: 2022-11-04 | End: 2022-11-11

## 2022-11-04 RX ORDER — MECLIZINE HYDROCHLORIDE 25 MG/1
25 TABLET ORAL 3 TIMES DAILY PRN
Qty: 15 TABLET | Refills: 0 | Status: SHIPPED | OUTPATIENT
Start: 2022-11-04 | End: 2022-11-09

## 2022-11-04 RX ORDER — FLUTICASONE PROPIONATE 50 MCG
2 SPRAY, SUSPENSION (ML) NASAL DAILY
Qty: 16 G | Refills: 0 | Status: SHIPPED | OUTPATIENT
Start: 2022-11-04

## 2022-11-04 ASSESSMENT — ENCOUNTER SYMPTOMS
COUGH: 0
SINUS PRESSURE: 1
ABDOMINAL PAIN: 0
DIARRHEA: 1
VOMITING: 1
NAUSEA: 1
SHORTNESS OF BREATH: 0

## 2022-11-04 NOTE — ED NOTES
Pt presents to the ER c/o multiple things, but mainly that she believes she is low on magnesium, nausea, headache, SOB, CP. Pt states hx of COPD and diabetes. Pt A&O x4, VSS, in NAD. Pt placed on full cardiac monitor, ekg taken, line established, labs sent. Pt able to ambulate on on power to bathroom. Steady gate. Pt states these symptoms have been going on for a couple days. Lives with daughter at home and states that family is sick as well. Dr. Kim Molina at bedside to assess pt further.       Grey Prescott RN  11/04/22 9089

## 2022-11-04 NOTE — ED NOTES
The following labs were labeled with appropriate pt sticker and tubed to lab:     [x] Blue     [x] Lavender   [] on ice  [x] Green/yellow  [x] Green/black [] on ice  [] Frank Puls  [] on ice  [] Yellow  [] Red  [] Type/ Screen  [] ABG  [] VBG    [] COVID-19 swab    [] Rapid  [] PCR  [] Flu swab  [] Peds Viral Panel     [] Urine Sample  [] Pelvic Cultures  [] Blood Cultures  [] X 2  [] STREP Cultures         Jeanie Duong RN  11/03/22 5195

## 2022-11-04 NOTE — ED PROVIDER NOTES
Faculty Sign-Out Attestation  Handoff taken on the following patient from prior Attending Physician: Tereza Vance    I was available and discussed any additional care issues that arose and coordinated the management plans with the resident(s) caring for the patient during my duty period. Any areas of disagreement with residents documentation of care or procedures are noted on the chart. I was personally present for the key portions of any/all procedures during my duty period. I have documented in the chart those procedures where I was not present during the key portions.     Dizzy, getting magnesium, then needing discharge    Nikita Guadalupe DO  Attending Physician       Nikita Guadalupe DO  11/03/22 4655    Tolerating liquids, ambulatory,   Vss > vss discharged per plan     Nikita Guadalupe DO  11/04/22 0134

## 2022-11-04 NOTE — ED PROVIDER NOTES
[facility]     Emergency Department     Faculty Attestation    I performed a history and physical examination of the patient and discussed management with the resident. I reviewed the residents note and agree with the documented findings and plan of care. Any areas of disagreement are noted on the chart. I was personally present for the key portions of any procedures. I have documented in the chart those procedures where I was not present during the key portions. I have reviewed the emergency nurses triage note. I agree with the chief complaint, past medical history, past surgical history, allergies, medications, social and family history as documented unless otherwise noted below. For Physician Assistant/ Nurse Practitioner cases/documentation I have personally evaluated this patient and have completed at least one if not all key elements of the E/M (history, physical exam, and MDM). Additional findings are as noted. I have personally seen and evaluated the patient. I find the patient's history and physical exam are consistent with the NP/PA documentation. I agree with the care provided, treatment rendered, disposition and follow-up plan. Patient describing dizziness which is a spinning sensation / frontal headaches patient has right flank pain patient is chronic chronic hypomagnesemia which she feels is the primary issue tonight denies any other associated complaints at this time. Critical Care     Izzy Ceron M.D.   Attending Emergency  Physician           Whitley Ivey MD  11/03/22 6662

## 2022-11-04 NOTE — ED PROVIDER NOTES
[facility]  ED  Emergency Department Encounter  Emergency Medicine Resident     Pt Name: Purvi Madrid  MRN: [id number]  [place] 1970  Date of evaluation: 11/4/22  PCP:  Liz Bertrand MD    CHIEF COMPLAINT       Chief Complaint   Patient presents with    Dizziness     Chest pain, flank pain, SOB x2 days       HISTORY OFPRESENT ILLNESS  (Location/Symptom, Timing/Onset, Context/Setting, Quality, Duration, Modifying Factors,Severity.)      Purvi Madrid is a 47 y.o. female with past medical history of bipolar disorder, COPD, diabetes, fibromyalgia, hyperlipidemia, hypertension who presents for complaints of dizziness. Patient states the dizziness has been ongoing for several days. Patient states that she does have a history remarkable for frequent episodes of hypomagnesemia and during her hypomagnesemia episode she does feel dizzy. She also states that she has had 2 days of diffuse myalgias and muscle spasms which are consistent with episodes of low magnesium. Patient states during this time she has also felt congested in her sinuses and feels that her sinuses are plugged up and she is having a headache and this is also making her dizzy. She does endorse sensation of movement around the room. Patient reports that she lives at home with her daughter who recently tested positive for COVID-19. Patient also reports over the 2-day timeframe she has had nausea, vomiting as well as multiple episodes of diarrhea. Medical history is also remarkable for alcohol abuse, review of records demonstrates the patient has had multiple visits to [facility] with hypomagnesemia with possible contributing factor of alcohol abuse.     PAST MEDICAL / SURGICAL / SOCIAL / FAMILY HISTORY      has a past medical history of Arthritis, Back pain, Bipolar 1 disorder ([facility] [address] COPD (chronic obstructive pulmonary disease) ([facility] [address] Depression, Diabetes mellitus ([facility] [address] Fibromyalgia, GERD (gastroesophageal reflux disease), Glaucoma, Hx of blood clots, Hyperlipidemia, Hypertension, Lumbosacral spondylosis without myelopathy, Morbid obesity ([facility] [address] On home oxygen therapy, Open abdominal wall wound, Pitting edema, PONV (postoperative nausea and vomiting), Renal stones, Sleep apnea, Thyroid disease, Urge incontinence, and Warts, genital.     has a past surgical history that includes Tubal ligation; Cholecystectomy; hernia repair (05/02/2017); hernia repair (N/A, 5/2/2017); Nerve Block (10/10/2018); Nerve Block (10/23/2018); Upper gastrointestinal endoscopy (N/A, 5/21/2020); Colonoscopy (N/A, 5/21/2020); and Upper gastrointestinal endoscopy (N/A, 7/20/2020). Social History     Socioeconomic History    Marital status:      Spouse name: Not on file    Number of children: Not on file    Years of education: Not on file    Highest education level: Not on file   Occupational History    Not on file   Tobacco Use    Smoking status: Former     Years: 25.00     Types: Cigarettes    Smokeless tobacco: Never    Tobacco comments:     quit in 2005   Vaping Use    Vaping Use: Never used   Substance and Sexual Activity    Alcohol use:  Yes     Alcohol/week: 10.0 - 12.0 standard drinks     Types: 10 - 12 Cans of beer per week    Drug use: Yes     Types: Marijuana Ethel Coad)    Sexual activity: Yes     Partners: Male     Birth control/protection: Surgical     Comment: S/P tubal ligation   Other Topics Concern    Not on file   Social History Narrative    Not on file     Social Determinants of Health     Financial Resource Strain: Not on file   Food Insecurity: Not on file   Transportation Needs: Not on file   Physical Activity: Not on file   Stress: Not on file   Social Connections: Not on file   Intimate Partner Violence: Not on file   Housing Stability: Not on file       Family History   Problem Relation Age of Onset    Hypertension Mother     Diabetes Mother     COPD Mother     Lung Cancer Father     Brain Cancer Father        Allergies:  Ioxaglate, Iv dye [iodides], Loratadine, Compazine spansule  [prochlorperazine], Hydrochlorothiazide, Iodine, Loratadine-pseudoephedrine er, Other, Prochlorperazine edisylate, Prochlorperazine maleate, and Pseudoephedrine    Home Medications:  Prior to Admission medications    Medication Sig Start Date End Date Taking? Authorizing Provider   meclizine (ANTIVERT) 25 MG tablet Take 1 tablet by mouth 3 times daily as needed for Dizziness or Nausea 11/4/22 11/9/22 Yes Jennifer Martinez DO   fluticasone [place]) 50 MCG/ACT nasal spray 2 sprays by Each Nostril route daily 11/4/22  Yes Jennifer Martinez DO   benzonatate (TESSALON) 100 MG capsule Take 1 capsule by mouth 3 times daily as needed for Cough 11/4/22 11/11/22 Yes Jennifer Martinez DO   MYRBETRIQ 25 MG TB24  7/13/22   Historical Provider, MD   hydrOXYzine pamoate (VISTARIL) 50 MG capsule  7/7/22   Historical Provider, MD   pantoprazole (PROTONIX) 40 MG tablet  6/21/22   Historical Provider, MD   montelukast (SINGULAIR) 10 MG tablet  7/13/22   Historical Provider, MD   gabapentin (NEURONTIN) 400 MG capsule  7/6/22   Historical Provider, MD   DULoxetine (CYMBALTA) 60 MG extended release capsule  7/7/22   Historical Provider, MD   clonazePAM (KLONOPIN) 0.5 MG tablet  7/7/22   Historical Provider, MD   Incontinence Supply Disposable MISC Apply 6 diapers daily for mixed urinary incontinence.  11/4/21   ANDRES Holbrook - CNP   furosemide (LASIX) 20 MG tablet TAKE 1 TABLET DAILY 4/8/20   Chelsey Jarrell MD   potassium chloride (MICRO-K) 10 MEQ extended release capsule TAKE 1 CAPSULE DAILY 9/26/19   Chelsey Jarrell MD   traZODone (DESYREL) 100 MG tablet TAKE 1 TABLET BY MOUTH NIGHTLY 8/25/19   Chelsey Jarrell MD   atorvastatin (LIPITOR) 40 MG tablet TAKE 1 TABLET DAILY 7/21/19   Kvng Story MD   ASPIRIN ADULT LOW STRENGTH 81 MG EC tablet TAKE 1 TABLET DAILY 2/20/19   Donal Saucedo MD   atenolol (TENORMIN) 50 MG tablet TAKE 1 TABLET DAILY 2/19/19   Yara Tompkins Thomas Castorena MD   levothyroxine (SYNTHROID) 150 MCG tablet TAKE 1 TABLET DAILY 2/19/19   Sharon Peraza MD   lisinopril (PRINIVIL;ZESTRIL) 40 MG tablet TAKE 1 TABLET DAILY 2/19/19   Sharon Peraza MD   sertraline (ZOLOFT) 100 MG tablet Take 1 tablet by mouth nightly 2/26/16   Historical Provider, MD       REVIEW OF SYSTEMS    (2-9 systems for level 4, 10 or more for level 5)      Review of Systems   Constitutional:  Negative for fever. HENT:  Positive for ear pain and sinus pressure. Negative for congestion. Eyes:  Negative for visual disturbance. Respiratory:  Negative for cough and shortness of breath. Cardiovascular:  Negative for chest pain. Gastrointestinal:  Positive for diarrhea, nausea and vomiting. Negative for abdominal pain. Genitourinary:  Negative for dysuria. Musculoskeletal:  Positive for myalgias. Skin:  Negative for rash. Neurological:  Positive for dizziness. Negative for headaches. PHYSICAL EXAM   (up to 7 for level 4, 8 or more for level 5)     INITIAL VITALS:    weight is 200 lb (90.7 kg). Her oral temperature is 99.1 °F (37.3 °C). Her blood pressure is 131/85 and her pulse is 77. Her respiration is 10 and oxygen saturation is 94%. Physical Exam  Constitutional:       Comments: Patient is awake, alert oriented person, place, time. Resting with her eyes closed, on examination patient displays pressured speech and is frequently switching subjects, she does have tangential thought process   HENT:      Head: Normocephalic and atraumatic. Right Ear: Tympanic membrane, ear canal and external ear normal.      Left Ear: Tympanic membrane normal.      Ears:      Comments: Dried blood noted left ear canal     Nose: Congestion and rhinorrhea present. Eyes:      Extraocular Movements: Extraocular movements intact. Pupils: Pupils are equal, round, and reactive to light. Cardiovascular:      Rate and Rhythm: Normal rate and regular rhythm.       Pulses: Normal pulses. Pulmonary:      Effort: Pulmonary effort is normal. No respiratory distress. Breath sounds: Normal breath sounds. No wheezing. Abdominal:      General: Abdomen is flat. Palpations: Abdomen is soft. Tenderness: There is no abdominal tenderness. There is no guarding or rebound. Musculoskeletal:      Cervical back: No tenderness. Right lower leg: No edema. Left lower leg: No edema. Skin:     General: Skin is warm and dry. Neurological:      Comments: Cranial nerves II-XII are equal and grossly intact with pupils equal and reactive with bilateral pupillary reflexes intact, no visual field deficits, intact extraocular motion, no facial motor deficit or droop, no facial sensory deficit, symmetrical palate elevation, intact tongue range of motion, good shoulder shrug and good neck range of motion, no finger-nose ataxia, no pronator drift, good hand grasp bilaterally, 5/5 strength in the bilateral upper and lower extremity, no sensory deficit in the upper or lower extremity.          DIFFERENTIAL  DIAGNOSIS     PLAN (LABS / IMAGING / EKG):  Orders Placed This Encounter   Procedures    COVID-19, Rapid    Rapid influenza A/B antigens    CBC with Auto Differential    Basic Metabolic Panel    Magnesium    Troponin    EKG 12 Lead       MEDICATIONS ORDERED:  Orders Placed This Encounter   Medications    0.9 % sodium chloride bolus    ondansetron (ZOFRAN) injection 4 mg    meclizine (ANTIVERT) tablet 25 mg    magnesium sulfate 2000 mg in 50 mL IVPB premix    acetaminophen (TYLENOL) tablet 1,000 mg    ibuprofen (ADVIL;MOTRIN) tablet 400 mg    DISCONTD: diazePAM (VALIUM) tablet 5 mg    DISCONTD: diazePAM (VALIUM) tablet 2 mg    scopolamine (TRANSDERM-SCOP) transdermal patch 1 patch    meclizine (ANTIVERT) 25 MG tablet     Sig: Take 1 tablet by mouth 3 times daily as needed for Dizziness or Nausea     Dispense:  15 tablet     Refill:  0    fluticasone (FLONASE) 50 MCG/ACT nasal spray     Sig: 2 sprays by Each Nostril route daily     Dispense:  16 g     Refill:  0    benzonatate (TESSALON) 100 MG capsule     Sig: Take 1 capsule by mouth 3 times daily as needed for Cough     Dispense:  21 capsule     Refill:  0       DDX: Electrolyte abnormality, BPPV, vestibular neuritis, labyrinthitis, hypomagnesemia, dehydration, orthostatic hypotension    Initial MDM/Plan: 47 y.o. female who presents with multiple complaints ongoing for the last 2 days as described above. On examination vital signs are unremarkable. She is awake alert and oriented person, place, time. She is well in appearance however does appear anxious, displaying pressured speech, as well as tangential speech. Examination demonstrates congestion and rhinorrhea. Dried blood noted left TM no ear effusion bilaterally. Neurological exam is unremarkable with no finger-nose ataxia no weakness no sensory deficits no cranial nerve deficits. Given diarrhea, history of alcohol abuse and history of frequent hypomagnesemia in line with patient's other episodes of dizziness as well as muscle spasms impression is likely electrolyte abnormality secondary to insensible losses with vomiting and diarrhea. Possible component of alcohol abuse. Patient also reports several complaints and intermittently changes this when I ask her. At first she states that symptoms are consistent with prior episodes of hypomagnesemia and then she states she never gets dizzy with hypomagnesemia and then when she is asked again she states that she always gets the symptoms with hypomagnesemia. Patient also states that she does have chest pain and then denies having chest pain as well as states she has flank pain and then denies that she has flank pain. Patient is a difficult historian.   She is also concerned for hyperglycemia and states that she has not been taking her insulin given that she has been waiting to follow-up with her family physician to get her blood sugar rechecked. Impression is likely electrolyte abnormality given vomiting and diarrhea, concerns for possible COVID given positive contact. Will obtain EKG and troponin to evaluate for underlying ACS however find this less likely. We will also check electrolytes. Check COVID and flu swab. Give Antivert, fluids reassess. DIAGNOSTIC RESULTS / EMERGENCY DEPARTMENT COURSE / MDM     LABS:  Labs Reviewed   COVID-19, RAPID - Abnormal; Notable for the following components:       Result Value    SARS-CoV-2, Rapid DETECTED (*)     All other components within normal limits   CBC WITH AUTO DIFFERENTIAL - Abnormal; Notable for the following components:    Hematocrit 36.1 (*)     MCV 76.5 (*)     Seg Neutrophils 75 (*)     Lymphocytes 16 (*)     All other components within normal limits   BASIC METABOLIC PANEL - Abnormal; Notable for the following components:    Glucose 183 (*)     Sodium 132 (*)     Chloride 95 (*)     All other components within normal limits   MAGNESIUM - Abnormal; Notable for the following components:    Magnesium 1.4 (*)     All other components within normal limits   RAPID INFLUENZA A/B ANTIGENS   TROPONIN         RADIOLOGY:  No results found. EKG  EKG Interpretation    Interpreted by me    Rhythm: normal sinus   Rate: normal  Axis: normal  Ectopy: none  Conduction: normal  ST Segments: no acute change  T Waves: no acute change  Q Waves: none    Clinical Impression: no acute changes and normal EKG    All EKG's are interpreted by the Emergency Department Physician who either signs or Co-signs this chart in the absence of a cardiologist.    EMERGENCY DEPARTMENT COURSE:     Troponin at patient's baseline, EKG unremarkable. Heart score of 3 no admission required for chest pain work-up on reassessment she does deny chest pain. Magnesium is low at 1.4 so she was given 2 g of magnesium as well as IV fluids. No leukocytosis no anemia. Blood glucose slightly elevated at 183 however noncontributory.   Flu is negative. Patient reassessed after Antivert and stated that it did help her symptoms however then denied it helping her symptoms. Patient was noted to be able to ambulate to the bathroom without assistance by RN. Patient given scopolamine patch for persistent dizziness. COVID test was positive. Likely impression is electrolyte abnormality as well as dehydration as result of COVID-19 infection and multiple episodes of diarrhea as well as component of alcohol abuse. Patient was instructed on alcohol cessation, encouraged p.o. fluid intake, she was discharged home with Tessalon per her request as well as Flonase per her request and Antivert. Return precautions were discussed and she was instructed to follow-up with her PCP.   PROCEDURES:  None    CONSULTS:  None    CRITICAL CARE:  Please see attending note    FINAL IMPRESSION      1. COVID-19          DISPOSITION / PLAN     DISPOSITION Decision To Discharge 11/04/2022 12:35:08 AM        PATIENTREFERRED TO:  Armando Mendoza MD  [address] [place] [id number]    Schedule an appointment as soon as possible for a visit   ER follow up      DISCHARGE MEDICATIONS:  Discharge Medication List as of 11/4/2022 12:35 AM        START taking these medications    Details   meclizine (ANTIVERT) 25 MG tablet Take 1 tablet by mouth 3 times daily as needed for Dizziness or Nausea, Disp-15 tablet, [id number]             Abdi Vega DO  EmergencyMedicine Resident    (Please note that portions of this note were completed with a voice recognition program.  Efforts were made to edit the dictations but occasionally words are mis-transcribed.)        Abdi Vega DO  Resident  11/04/22 [id number]

## 2022-11-18 ENCOUNTER — HOSPITAL ENCOUNTER (OUTPATIENT)
Dept: MAMMOGRAPHY | Age: 52
Discharge: HOME | End: 2022-11-20
Payer: MEDICARE

## 2022-11-18 DIAGNOSIS — Z12.31 ENCOUNTER FOR SCREENING MAMMOGRAM FOR MALIGNANT NEOPLASM OF BREAST: ICD-10-CM

## 2022-11-18 PROCEDURE — 77067 SCR MAMMO BI INCL CAD: CPT

## 2023-01-11 NOTE — PROGRESS NOTES
[address] [address]  [facility] Urology Office Note - Established    Patient:  Dontrell Wheeler  YOB: 1970  Date: 3/16/2021    The patient is a 49 y.o. female whopresents today for evaluation of the following problems:   Chief Complaint   Patient presents with    3 Month Follow-Up    Medication Check       HPI  Here for follow up on incontinence. DM-for 15 yrs, Insulin and oral meds. Her A1C is improving down to 11. She has lost has lost 60 # with diet modifications. Continues with Dry mouth. She is on Myrbetriq daily. She is using 4-5 briefs per day, has both stresss and urge incontinence. She continues to have problems with constipation- would like Miralax  ordered. Has frequency, urgency and nocturia 4x. She is not happy with her symptoms control. Hx of kidney stone, passed in . No recent stone passage or hematuria. Summary of old records: N/A    Additional History: N/A    Procedures Today: N/A    Urinalysis today:  No results found for this visit on 21. Imaging Reviewed during this Office Visit:    R/O Aortic Aneursym   [facility]   Department of Radiology   [address] [place] [address]   [phone]               ==========================================================================   Patient Name: Terese Record    : 1970   Sex: F   Age:    Valarie Fung   MRN: [id number]   Pt. Location: [id number]   Patient Status: E   Visit #: [id number]   Ordered Date: 2018 5:35:00 PM   Completed Date: 2018 05:45 PM   Requesting Provider: Dipti Foy    Attending Provider: Dipti Foy    Report Copy To:     Signs ^ Symptoms: Abdominal Pain(specify)   History: Patient history not available   Comments: R/O Aortic Aneursym   Exam: CT ABDOMEN AND PELVIS WO CONTRAST   Accession #: [id number] Yumi Ivey CMA at 1/10/2023  3:16 PM    Status: Signed   Pt is to schedule an EGD, CPT Code: 67550, Dx:   Gastroesophageal reflux disease, unspecified whether esophagitis present [K21.9]       Abdominal pain, acute, epigastric [R10.13]      , w/mac sedation at [facility]. Orders were entered into epic.  Prep for Case to be initiated by Surgery Scheduling Pool.    Pt will expect a call from our GI  within 1-2 days and was instructed to call the office if she had not been contacted within that time frame to schedule the procedure.     Routed to GI scheduling pool to contact the pt and arrange a date/time for the procedure at one of the following facilities:  [facility].  Send written prep instructions Letter #[id number].     Pt >65 yrs of age: no  Anesthesia: mac  Sleep apnea: no  CPAP: no      Home O2: no  Hx Narcotics/Benzodiazepines/Phentermine: no  Diabetic: no  Cardiac Concerns: no  Respiratory Concerns: no  COVID-19:  Hx: no   Symptoms at this time?: no     Due to [facility] Policy: a Pre-Procedure COVID test is not recommended at this time.   ______________________________________________________________________  Latex Allergy: no  Coumadin: no , Plavix: no, Xarelto: no  BMI >40? = no                ==========================================================================   CT ABDOMEN AND PELVIS WO CONTRAST  7/27/2018 5:45 PM EDT       SIGNS AND SYMPTOMS: Abdominal Pain(specify)       TECHNOLOGIST COMMENTS: weakness abd pain since this am       QUESTION FOR THE RADIOLOGIST: R/O Aortic Aneursym       PROTOCOL: Axial CT images of the abdomen and pelvis were obtained    without IV contrast.        TECHNIQUE: Multidetector ct axial images of the abdomen and pelvis    were obtained without IV contrast. Multiplanar reformats were    performed and viewed on a separate workstation and reviewed to    further define anatomy and possible pathology. Appropriate CT dose    lowering techniques were utilized.       COMPARISON: None.       FINDINGS:       Lower Chest: Minimal bibasal platelike atelectasis. No focal    infiltrates, or pneumothorax. Triangular area of fluid collection is    seen in the right cardiophrenic angle measuring 3 x 4 cm which may    represent loculated medial left pleural fluid versus small    pericardial cyst       ABDOMEN:   Liver:  Within normal limits. Bile Ducts: Normal caliber. Gallbladder: Clips are visualized at the gallbladder fossa from prior    cholecystectomy   Pancreas:  Within normal limits. Spleen:  Within normal limits. Adrenals:  Within normal limits. Kidneys: 1 cm cyst off the posterior cortex of the left kidney. 3 mm    nonobstructing stones in the left collecting system.       Pelvis:   Reproductive Organs: No pelvic masses. Ureters: Within normal limits. Bladder: Within normal limits.       Bowel: Normal caliber. Mesenteric Lymph Nodes: No enlarged mesenteric lymph nodes. Peritoneum: No ascites or free air, no fluid collection. Vessels: Mild atherosclerotic calcification in the abdominal aorta. Otherwise, within normal limits   Retroperitoneum: Within normal limits.    Abdominal Wall: Suggestion of prior mesh hernia repair in the    anterior abdominal wall and tiny umbilical hernia containing only    omental fat. Bones: Mild bony spurring in the lower thoracic spine and at L4    suggesting mild spondylosis.  Small calcified phlebolith in the left    pelvis inferiorly.       IMPRESSION:       Evidence of prior anterior abdominal wall mesh hernia repair. Evidence of prior cholecystectomy.       Vascular calcification in the abdominal aorta with no evidence of    aneurysmal dilatation or dissection.       Left renal cyst and small nonobstructing left renal stones.       Otherwise, unremarkable abdomen and pelvis CT without contrast.       Electronically signed by:Lise Lucio.               Transcribed by: Saeid, User    Resident:    Electronically Signed by: Dora Figueroa @ 07/27/2018 06:41 PM       (results were independently reviewed by physician and radiology report verified)    AUA Symptom Score (3/16/2021):   INCOMPLETE EMPTYING: How often have you had the sensation of not emptying your bladder?: Not at all  FREQUENCY: How often do you have to urinate less than every two hours?: About Half the time  INTERMITTENCY: How often have you found you stopped and started again several times when you urinated?: Not at all  URGENCY: How often have you found it difficult to postpone urination?: About Half the time  WEAK STREAM: How often have you had a weak urinary stream?: Not at all  STRAINING: How often have you had to strain to start  urination?: Less than 1 to 5 times  NOCTURIA: How many times did you typically get up at night to uriniate?: 3 Times  TOTAL I-PSS SCORE[de-identified] 10  How would you feel if you were to spend the rest of your life with your urinary condition?: Mixe    Last BUN and creatinine:  Lab Results   Component Value Date    BUN 6 (L) 07/25/2019     Lab Results   Component Value Date    CREATININE 0.72 07/25/2019       Additional Lab/Culture results:    PAST MEDICAL, FAMILY AND SOCIAL HISTORY UPDATE:  Past Medical History:   Diagnosis Date into the lungs 2 times daily      cyclobenzaprine (FLEXERIL) 5 MG tablet Take 5 mg by mouth 2 times daily as needed      cyanocobalamin 1000 MCG tablet Take 1,000 mcg by mouth daily      polyethylene glycol (GLYCOLAX) 17 GM/SCOOP powder Take 17 g by mouth daily 1530 g 1    MYRBETRIQ 50 MG TB24 Take 50 mg by mouth daily 30 tablet 11    NONFORMULARY Place 1 drop into both eyes daily Indications: for dry eyes Optase eye drop      acetaminophen (APAP EXTRA STRENGTH) 500 MG tablet Take 2 tablets by mouth every 6 hours as needed for Pain 30 tablet 0    tiotropium (SPIRIVA HANDIHALER) 18 MCG inhalation capsule Inhale 18 mcg into the lungs daily      furosemide (LASIX) 20 MG tablet TAKE 1 TABLET DAILY 60 tablet 2    omeprazole (PRILOSEC) 40 MG delayed release capsule TAKE 1 CAPSULE DAILY 30 capsule 3    Benzocaine 15 MG LOZG Take 1 lozenge by mouth 3 times daily 18 lozenge 0    ibuprofen (ADVIL;MOTRIN) 600 MG tablet Take 1 tablet by mouth every 8 hours as needed for Pain 30 tablet 0    potassium chloride (MICRO-K) 10 MEQ extended release capsule TAKE 1 CAPSULE DAILY 28 capsule 2    traZODone (DESYREL) 100 MG tablet TAKE 1 TABLET BY MOUTH NIGHTLY 30 tablet 0    atorvastatin (LIPITOR) 40 MG tablet TAKE 1 TABLET DAILY 30 tablet 5    ASPIRIN ADULT LOW STRENGTH 81 MG EC tablet TAKE 1 TABLET DAILY 30 tablet 5    atenolol (TENORMIN) 50 MG tablet TAKE 1 TABLET DAILY 30 tablet 5    levothyroxine (SYNTHROID) 150 MCG tablet TAKE 1 TABLET DAILY 30 tablet 5    lisinopril (PRINIVIL;ZESTRIL) 40 MG tablet TAKE 1 TABLET DAILY 30 tablet 5    metFORMIN (GLUCOPHAGE) 1000 MG tablet TAKE 1 TABLET TWICE A DAY 60 tablet 5    ferrous sulfate 325 (65 Fe) MG EC tablet take 1 tablet by mouth twice a day 60 tablet 3    Incontinence Supply Disposable MISC 1 each by Does not apply route 3 times daily Diapers 90 each 1    albuterol sulfate HFA (VENTOLIN HFA) 108 (90 Base) MCG/ACT inhaler Inhale 2 puffs into the lungs every 6 hours as needed for Wheezing 1 Inhaler 3    ipratropium (ATROVENT HFA) 17 MCG/ACT inhaler Inhale 2 puffs into the lungs every 6 hours 1 Inhaler 3    nystatin (MYCOSTATIN) 625473 UNIT/GM powder Apply 3 times daily. 1 Bottle 0    meloxicam (MOBIC) 15 MG tablet TAKE 1 TABLET DAILY 30 tablet 0    fluticasone (FLONASE) 50 MCG/ACT nasal spray 1 spray by Each Nare route daily 1 Bottle 0    hydrOXYzine (VISTARIL) 25 MG capsule Take 25-75 mg by mouth      nitroGLYCERIN (NITROSTAT) 0.4 MG SL tablet TAKE 1 TABLET UNDER THE TONGUE EVERY 5 MINUTES AS NEEDED FOR CHESTPAIN 25 tablet 0    DULoxetine (CYMBALTA) 30 MG extended release capsule Take 30 mg by mouth daily      COMFORT EZ PEN NEEDLES 32G X 6 MM MISC USE AS DIRECTED DAILY 100 each 0    TRUE METRIX BLOOD GLUCOSE TEST strip USE AS DIRECTED DAILY AS NEEDED 100 each 5    Lancets MISC Use to check sugars daily. 100 each 3    LUMIGAN 0.01 % SOLN ophthalmic drops Place 1 drop into both eyes nightly       LATUDA 80 MG TABS tablet Take 80 mg by mouth daily       sertraline (ZOLOFT) 100 MG tablet Take 1 tablet by mouth nightly      OLANZapine (ZYPREXA) 10 MG tablet TAKE 1 TABLET AT BEDTIME.  28 tablet 1      (All medications reviewed and updated by provider sincelast office visit or hospitalization)   Ioxaglate, Iv dye [iodides], Loratadine, Compazine spansule  [prochlorperazine], Hydrochlorothiazide, Iodine, Loratadine-pseudoephedrine er, Prochlorperazine edisylate, and Pseudoephedrine  Social History     Tobacco Use   Smoking Status Former Smoker    Years: 25.00   Smokeless Tobacco Never Used   Tobacco Comment    quit in 2005      (If patient a smoker, smoking cessation counseling offered)     Social History     Substance and Sexual Activity   Alcohol Use Yes    Alcohol/week: 10.0 - 12.0 standard drinks    Types: 10 - 12 Cans of beer per week       REVIEW OF SYSTEMS:  Review of Systems      Physical Exam:      Vitals:    03/16/21 0944   BP: 104/70   Pulse: 61   Temp: 97.7 °F (36.5 °C)     There is no height or weight on file to calculate BMI. Patient is a 49 y.o. female in noacute distress and alert and oriented to person, place and time. Physical Exam  Constitutional: Patient in no acute distress. Neuro: Alert andoriented to person, place and time. Psych: Mood normal, affect normal  Skin:warm, pink,  No rash noted  HEENT: Head: Normocephalic and atraumatic  Conjunctivae and EOM are normal. Pupils are equal, round  Nose: Normal  Right External Ear: Normal; Left External Ear: Normal  Mouth: Mucosa Moist  Neck: Supple  Lungs: Respiratory effort is normal  Cardiovascular: Warm & Pink  Abdomen: Soft, non-tender, non-distended  Bladder non-tender and not distended. Musculoskeletal: Normal gait and station      and Plan      1. Mixed incontinence urge and stress    2. Urinary frequency    3. Nocturia    4. Kidney stone           Plan:    start  Miralax daily- taper the dose if stool becomes too loose to 1/2 the dose or even less    Continue Myrbetriq    Continue working on weight loss and A1C     UDS - urine symptoms are not at goal. Discussed testing- pt would like to have testing done. reviewed past CT report, will update renal US for Hx of stone. No follow-ups on file. Prescriptions Ordered:  Orders Placed This Encounter   Medications    polyethylene glycol (GLYCOLAX) 17 GM/SCOOP powder     Sig: Take 17 g by mouth daily     Dispense:  1530 g     Refill:  1      Orders Placed:  Orders Placed This Encounter   Procedures    US RENAL COMPLETE     Standing Status:   Future     Standing Expiration Date:   3/17/2022            ANDRES Cunningham - CNP    reviewed and Agree with the ROS entered by the MA.

## 2023-01-25 LAB — GLUCOSE BLD-MCNC: 181 MG/DL (ref 70–105)

## 2023-06-28 ENCOUNTER — APPOINTMENT (OUTPATIENT)
Dept: GENERAL RADIOLOGY | Age: 53
DRG: 880 | End: 2023-06-28
Payer: MEDICARE

## 2023-06-28 ENCOUNTER — HOSPITAL ENCOUNTER (INPATIENT)
Age: 53
LOS: 3 days | Discharge: HOME | DRG: 880 | End: 2023-07-01
Attending: EMERGENCY MEDICINE | Admitting: INTERNAL MEDICINE
Payer: MEDICARE

## 2023-06-28 DIAGNOSIS — E05.90 HYPERTHYROIDISM: Primary | ICD-10-CM

## 2023-06-28 PROBLEM — E05.91: Status: ACTIVE | Noted: 2023-06-28

## 2023-06-28 LAB
ANION GAP SERPL CALCULATED.3IONS-SCNC: 21 MMOL/L (ref 9–17)
BASOPHILS # BLD: 0.04 K/UL (ref 0–0.2)
BASOPHILS NFR BLD: 0 % (ref 0–2)
BILIRUB UR QL STRIP: NEGATIVE
BUN SERPL-MCNC: 8 MG/DL (ref 6–20)
CALCIUM SERPL-MCNC: 9.8 MG/DL (ref 8.6–10.4)
CHLORIDE SERPL-SCNC: 94 MMOL/L (ref 98–107)
CLARITY UR: CLEAR
CO2 SERPL-SCNC: 16 MMOL/L (ref 20–31)
COLOR UR: YELLOW
COMMENT UA: NORMAL
CREAT SERPL-MCNC: 0.58 MG/DL (ref 0.5–0.9)
EOSINOPHIL # BLD: 0.13 K/UL (ref 0–0.44)
EOSINOPHILS RELATIVE PERCENT: 1 % (ref 1–4)
ERYTHROCYTE [DISTWIDTH] IN BLOOD BY AUTOMATED COUNT: 12.2 % (ref 11.8–14.4)
GFR SERPL CREATININE-BSD FRML MDRD: >60 ML/MIN/1.73M2
GLUCOSE SERPL-MCNC: 227 MG/DL (ref 70–99)
GLUCOSE UR STRIP-MCNC: NEGATIVE MG/DL
HCT VFR BLD AUTO: 37.2 % (ref 36.3–47.1)
HGB BLD-MCNC: 12.6 G/DL (ref 11.9–15.1)
HGB UR QL STRIP.AUTO: NEGATIVE
IMM GRANULOCYTES # BLD AUTO: 0.03 K/UL (ref 0–0.3)
IMM GRANULOCYTES NFR BLD: 0 %
KETONES UR STRIP-MCNC: NEGATIVE MG/DL
LEUKOCYTE ESTERASE UR QL STRIP: NEGATIVE
LYMPHOCYTES # BLD: 24 % (ref 24–43)
LYMPHOCYTES NFR BLD: 2.36 K/UL (ref 1.1–3.7)
MAGNESIUM SERPL-MCNC: 2.1 MG/DL (ref 1.6–2.6)
MCH RBC QN AUTO: 26.5 PG (ref 25.2–33.5)
MCHC RBC AUTO-ENTMCNC: 33.9 G/DL (ref 28.4–34.8)
MCV RBC AUTO: 78.3 FL (ref 82.6–102.9)
MONOCYTES NFR BLD: 0.64 K/UL (ref 0.1–1.2)
MONOCYTES NFR BLD: 7 % (ref 3–12)
NEUTROPHILS NFR BLD: 68 % (ref 36–65)
NEUTS SEG NFR BLD: 6.58 K/UL (ref 1.5–8.1)
NITRITE UR QL STRIP: NEGATIVE
NRBC BLD-RTO: 0 PER 100 WBC
PH UR STRIP: 8 [PH] (ref 5–8)
PLATELET # BLD AUTO: 359 K/UL (ref 138–453)
PMV BLD AUTO: 9.7 FL (ref 8.1–13.5)
POTASSIUM SERPL-SCNC: 3.9 MMOL/L (ref 3.7–5.3)
PROT UR STRIP-MCNC: NEGATIVE MG/DL
RBC # BLD AUTO: 4.75 M/UL (ref 3.95–5.11)
RBC # BLD: ABNORMAL 10*6/UL
SODIUM SERPL-SCNC: 131 MMOL/L (ref 135–144)
SP GR UR STRIP: 1.01 (ref 1–1.03)
T3FREE SERPL-MCNC: 3.43 PG/ML (ref 2.02–4.43)
T4 FREE SERPL-MCNC: 2.3 NG/DL (ref 0.9–1.7)
TROPONIN I SERPL HS-MCNC: 9 NG/L (ref 0–14)
TSH SERPL DL<=0.05 MIU/L-ACNC: 0.03 UIU/ML (ref 0.3–5)
UROBILINOGEN UR STRIP-ACNC: NORMAL
WBC OTHER # BLD: 9.8 K/UL (ref 3.5–11.3)

## 2023-06-28 PROCEDURE — 84439 ASSAY OF FREE THYROXINE: CPT

## 2023-06-28 PROCEDURE — 2060000000 HC ICU INTERMEDIATE R&B

## 2023-06-28 PROCEDURE — 85027 COMPLETE CBC AUTOMATED: CPT

## 2023-06-28 PROCEDURE — 99285 EMERGENCY DEPT VISIT HI MDM: CPT

## 2023-06-28 PROCEDURE — 71045 X-RAY EXAM CHEST 1 VIEW: CPT

## 2023-06-28 PROCEDURE — 80048 BASIC METABOLIC PNL TOTAL CA: CPT

## 2023-06-28 PROCEDURE — 83735 ASSAY OF MAGNESIUM: CPT

## 2023-06-28 PROCEDURE — 93005 ELECTROCARDIOGRAM TRACING: CPT | Performed by: STUDENT IN AN ORGANIZED HEALTH CARE EDUCATION/TRAINING PROGRAM

## 2023-06-28 PROCEDURE — 84484 ASSAY OF TROPONIN QUANT: CPT

## 2023-06-28 PROCEDURE — 84443 ASSAY THYROID STIM HORMONE: CPT

## 2023-06-28 PROCEDURE — 80076 HEPATIC FUNCTION PANEL: CPT

## 2023-06-28 PROCEDURE — 6360000002 HC RX W HCPCS: Performed by: STUDENT IN AN ORGANIZED HEALTH CARE EDUCATION/TRAINING PROGRAM

## 2023-06-28 PROCEDURE — 99223 1ST HOSP IP/OBS HIGH 75: CPT | Performed by: INTERNAL MEDICINE

## 2023-06-28 PROCEDURE — 6360000002 HC RX W HCPCS: Performed by: NURSE PRACTITIONER

## 2023-06-28 PROCEDURE — 2580000003 HC RX 258: Performed by: NURSE PRACTITIONER

## 2023-06-28 PROCEDURE — 96374 THER/PROPH/DIAG INJ IV PUSH: CPT

## 2023-06-28 PROCEDURE — 6370000000 HC RX 637 (ALT 250 FOR IP): Performed by: NURSE PRACTITIONER

## 2023-06-28 PROCEDURE — 6370000000 HC RX 637 (ALT 250 FOR IP): Performed by: STUDENT IN AN ORGANIZED HEALTH CARE EDUCATION/TRAINING PROGRAM

## 2023-06-28 PROCEDURE — 84481 FREE ASSAY (FT-3): CPT

## 2023-06-28 PROCEDURE — 96376 TX/PRO/DX INJ SAME DRUG ADON: CPT

## 2023-06-28 PROCEDURE — 81003 URINALYSIS AUTO W/O SCOPE: CPT

## 2023-06-28 RX ORDER — SODIUM CHLORIDE 9 MG/ML
INJECTION, SOLUTION INTRAVENOUS CONTINUOUS
Status: DISCONTINUED | OUTPATIENT
Start: 2023-06-28 | End: 2023-06-29

## 2023-06-28 RX ORDER — MAGNESIUM SULFATE 1 G/100ML
1000 INJECTION INTRAVENOUS PRN
Status: DISCONTINUED | OUTPATIENT
Start: 2023-06-28 | End: 2023-07-01 | Stop reason: HOSPADM

## 2023-06-28 RX ORDER — ONDANSETRON 2 MG/ML
4 INJECTION INTRAMUSCULAR; INTRAVENOUS EVERY 6 HOURS PRN
Status: DISCONTINUED | OUTPATIENT
Start: 2023-06-28 | End: 2023-07-01 | Stop reason: HOSPADM

## 2023-06-28 RX ORDER — ACETAMINOPHEN 325 MG/1
650 TABLET ORAL EVERY 6 HOURS PRN
Status: DISCONTINUED | OUTPATIENT
Start: 2023-06-28 | End: 2023-07-01 | Stop reason: HOSPADM

## 2023-06-28 RX ORDER — SODIUM CHLORIDE 0.9 % (FLUSH) 0.9 %
10 SYRINGE (ML) INJECTION PRN
Status: DISCONTINUED | OUTPATIENT
Start: 2023-06-28 | End: 2023-06-30

## 2023-06-28 RX ORDER — SODIUM CHLORIDE 9 MG/ML
INJECTION, SOLUTION INTRAVENOUS PRN
Status: DISCONTINUED | OUTPATIENT
Start: 2023-06-28 | End: 2023-07-01 | Stop reason: HOSPADM

## 2023-06-28 RX ORDER — ASPIRIN 81 MG/1
81 TABLET ORAL DAILY
Status: DISCONTINUED | OUTPATIENT
Start: 2023-06-29 | End: 2023-07-01 | Stop reason: HOSPADM

## 2023-06-28 RX ORDER — ENOXAPARIN SODIUM 100 MG/ML
40 INJECTION SUBCUTANEOUS DAILY
Status: DISCONTINUED | OUTPATIENT
Start: 2023-06-29 | End: 2023-07-01 | Stop reason: HOSPADM

## 2023-06-28 RX ORDER — POTASSIUM CHLORIDE 7.45 MG/ML
10 INJECTION INTRAVENOUS PRN
Status: DISCONTINUED | OUTPATIENT
Start: 2023-06-28 | End: 2023-07-01 | Stop reason: HOSPADM

## 2023-06-28 RX ORDER — POTASSIUM CHLORIDE 20 MEQ/1
40 TABLET, EXTENDED RELEASE ORAL PRN
Status: DISCONTINUED | OUTPATIENT
Start: 2023-06-28 | End: 2023-07-01 | Stop reason: HOSPADM

## 2023-06-28 RX ORDER — OXYCODONE HYDROCHLORIDE AND ACETAMINOPHEN 5; 325 MG/1; MG/1
1 TABLET ORAL ONCE
Status: COMPLETED | OUTPATIENT
Start: 2023-06-28 | End: 2023-06-28

## 2023-06-28 RX ORDER — ONDANSETRON 2 MG/ML
4 INJECTION INTRAMUSCULAR; INTRAVENOUS ONCE
Status: COMPLETED | OUTPATIENT
Start: 2023-06-28 | End: 2023-06-28

## 2023-06-28 RX ORDER — ACETAMINOPHEN 650 MG/1
650 SUPPOSITORY RECTAL EVERY 6 HOURS PRN
Status: DISCONTINUED | OUTPATIENT
Start: 2023-06-28 | End: 2023-07-01 | Stop reason: HOSPADM

## 2023-06-28 RX ORDER — SODIUM CHLORIDE 0.9 % (FLUSH) 0.9 %
5-40 SYRINGE (ML) INJECTION EVERY 12 HOURS SCHEDULED
Status: DISCONTINUED | OUTPATIENT
Start: 2023-06-28 | End: 2023-06-30

## 2023-06-28 RX ORDER — FLUTICASONE PROPIONATE 50 MCG
2 SPRAY, SUSPENSION (ML) NASAL DAILY
Status: DISCONTINUED | OUTPATIENT
Start: 2023-06-29 | End: 2023-07-01 | Stop reason: HOSPADM

## 2023-06-28 RX ORDER — PROPRANOLOL HYDROCHLORIDE 10 MG/1
10 TABLET ORAL ONCE
Status: DISCONTINUED | OUTPATIENT
Start: 2023-06-28 | End: 2023-06-30

## 2023-06-28 RX ORDER — LORAZEPAM 0.5 MG/1
1 TABLET ORAL ONCE
Status: COMPLETED | OUTPATIENT
Start: 2023-06-28 | End: 2023-06-28

## 2023-06-28 RX ORDER — ATORVASTATIN CALCIUM 40 MG/1
40 TABLET, FILM COATED ORAL NIGHTLY
Status: DISCONTINUED | OUTPATIENT
Start: 2023-06-28 | End: 2023-07-01 | Stop reason: HOSPADM

## 2023-06-28 RX ORDER — ONDANSETRON 4 MG/1
4 TABLET, ORALLY DISINTEGRATING ORAL EVERY 8 HOURS PRN
Status: DISCONTINUED | OUTPATIENT
Start: 2023-06-28 | End: 2023-07-01 | Stop reason: HOSPADM

## 2023-06-28 RX ORDER — ATENOLOL 50 MG/1
50 TABLET ORAL DAILY
Status: DISCONTINUED | OUTPATIENT
Start: 2023-06-29 | End: 2023-07-01 | Stop reason: HOSPADM

## 2023-06-28 RX ORDER — POLYETHYLENE GLYCOL 3350 17 G/17G
17 POWDER, FOR SOLUTION ORAL DAILY PRN
Status: DISCONTINUED | OUTPATIENT
Start: 2023-06-28 | End: 2023-07-01 | Stop reason: HOSPADM

## 2023-06-28 RX ORDER — FUROSEMIDE 20 MG/1
20 TABLET ORAL DAILY
Status: DISCONTINUED | OUTPATIENT
Start: 2023-06-29 | End: 2023-07-01 | Stop reason: HOSPADM

## 2023-06-28 RX ORDER — TRAZODONE HYDROCHLORIDE 100 MG/1
100 TABLET ORAL NIGHTLY
Status: DISCONTINUED | OUTPATIENT
Start: 2023-06-28 | End: 2023-07-01 | Stop reason: HOSPADM

## 2023-06-28 RX ORDER — DULOXETIN HYDROCHLORIDE 30 MG/1
60 CAPSULE, DELAYED RELEASE ORAL DAILY
Status: DISCONTINUED | OUTPATIENT
Start: 2023-06-29 | End: 2023-07-01 | Stop reason: HOSPADM

## 2023-06-28 RX ADMIN — ONDANSETRON 4 MG: 2 INJECTION INTRAMUSCULAR; INTRAVENOUS at 22:56

## 2023-06-28 RX ADMIN — OXYCODONE HYDROCHLORIDE AND ACETAMINOPHEN 1 TABLET: 5; 325 TABLET ORAL at 22:56

## 2023-06-28 RX ADMIN — DESMOPRESSIN ACETATE 40 MG: 0.2 TABLET ORAL at 22:56

## 2023-06-28 RX ADMIN — TRAZODONE HYDROCHLORIDE 100 MG: 100 TABLET ORAL at 22:56

## 2023-06-28 RX ADMIN — SODIUM CHLORIDE, PRESERVATIVE FREE 10 ML: 5 INJECTION INTRAVENOUS at 22:58

## 2023-06-28 RX ADMIN — LORAZEPAM 1 MG: 0.5 TABLET ORAL at 17:15

## 2023-06-28 RX ADMIN — ONDANSETRON 4 MG: 2 INJECTION INTRAMUSCULAR; INTRAVENOUS at 17:15

## 2023-06-28 ASSESSMENT — PAIN DESCRIPTION - LOCATION
LOCATION: HEAD
LOCATION: ABDOMEN;HEAD
LOCATION: BACK
LOCATION: ABDOMEN;BACK

## 2023-06-28 ASSESSMENT — PAIN DESCRIPTION - DESCRIPTORS
DESCRIPTORS: ACHING
DESCRIPTORS: STABBING
DESCRIPTORS: ACHING

## 2023-06-28 ASSESSMENT — PAIN - FUNCTIONAL ASSESSMENT
PAIN_FUNCTIONAL_ASSESSMENT: ACTIVITIES ARE NOT PREVENTED
PAIN_FUNCTIONAL_ASSESSMENT: ACTIVITIES ARE NOT PREVENTED
PAIN_FUNCTIONAL_ASSESSMENT: 0-10

## 2023-06-28 ASSESSMENT — PAIN DESCRIPTION - FREQUENCY
FREQUENCY: CONTINUOUS
FREQUENCY: INTERMITTENT

## 2023-06-28 ASSESSMENT — PAIN SCALES - GENERAL
PAINLEVEL_OUTOF10: 0
PAINLEVEL_OUTOF10: 10

## 2023-06-29 PROBLEM — F33.1 MDD (MAJOR DEPRESSIVE DISORDER), RECURRENT EPISODE, MODERATE (HCC): Status: ACTIVE | Noted: 2023-06-29

## 2023-06-29 PROBLEM — R63.4 WEIGHT LOSS: Status: ACTIVE | Noted: 2023-06-29

## 2023-06-29 PROBLEM — R19.7 DIARRHEA OF PRESUMED INFECTIOUS ORIGIN: Status: ACTIVE | Noted: 2023-06-29

## 2023-06-29 PROBLEM — E05.90 HYPERTHYROIDISM: Status: ACTIVE | Noted: 2023-06-29

## 2023-06-29 PROBLEM — K31.84 DIABETIC GASTROPARESIS (HCC): Status: ACTIVE | Noted: 2023-06-29

## 2023-06-29 PROBLEM — E87.1 HYPONATREMIA: Status: ACTIVE | Noted: 2023-06-29

## 2023-06-29 PROBLEM — R11.14 BILIOUS VOMITING WITH NAUSEA: Status: ACTIVE | Noted: 2023-06-29

## 2023-06-29 PROBLEM — E11.42 DIABETIC POLYNEUROPATHY ASSOCIATED WITH TYPE 2 DIABETES MELLITUS (HCC): Status: ACTIVE | Noted: 2023-06-29

## 2023-06-29 PROBLEM — R29.6 RECURRENT FALLS: Status: ACTIVE | Noted: 2023-06-29

## 2023-06-29 PROBLEM — E05.80 IATROGENIC HYPERTHYROIDISM: Status: ACTIVE | Noted: 2023-06-29

## 2023-06-29 PROBLEM — G89.4 CHRONIC PAIN SYNDROME: Status: ACTIVE | Noted: 2023-06-29

## 2023-06-29 PROBLEM — R10.13 EPIGASTRIC ABDOMINAL PAIN: Status: ACTIVE | Noted: 2023-06-29

## 2023-06-29 PROBLEM — E11.43 DIABETIC GASTROPARESIS (HCC): Status: ACTIVE | Noted: 2023-06-29

## 2023-06-29 LAB
ALBUMIN SERPL-MCNC: 4.3 G/DL (ref 3.5–5.2)
ALBUMIN/GLOB SERPL: 1.4 {RATIO} (ref 1–2.5)
ALP SERPL-CCNC: 86 U/L (ref 35–104)
ALT SERPL-CCNC: 15 U/L (ref 5–33)
ANION GAP SERPL CALCULATED.3IONS-SCNC: 13 MMOL/L (ref 9–17)
AST SERPL-CCNC: 23 U/L
BASOPHILS # BLD: 0.05 K/UL (ref 0–0.2)
BASOPHILS NFR BLD: 1 % (ref 0–2)
BILIRUB DIRECT SERPL-MCNC: 0.1 MG/DL
BILIRUB INDIRECT SERPL-MCNC: 0.4 MG/DL (ref 0–1)
BILIRUB SERPL-MCNC: 0.5 MG/DL (ref 0.3–1.2)
BUN SERPL-MCNC: 8 MG/DL (ref 6–20)
CALCIUM SERPL-MCNC: 9.3 MG/DL (ref 8.6–10.4)
CHLORIDE SERPL-SCNC: 103 MMOL/L (ref 98–107)
CO2 SERPL-SCNC: 20 MMOL/L (ref 20–31)
CREAT SERPL-MCNC: 0.63 MG/DL (ref 0.5–0.9)
EOSINOPHIL # BLD: 0.15 K/UL (ref 0–0.44)
EOSINOPHILS RELATIVE PERCENT: 2 % (ref 1–4)
ERYTHROCYTE [DISTWIDTH] IN BLOOD BY AUTOMATED COUNT: 12.1 % (ref 11.8–14.4)
EST. AVERAGE GLUCOSE BLD GHB EST-MCNC: 163 MG/DL
GFR SERPL CREATININE-BSD FRML MDRD: >60 ML/MIN/1.73M2
GLUCOSE BLD-MCNC: 109 MG/DL (ref 65–105)
GLUCOSE BLD-MCNC: 122 MG/DL (ref 65–105)
GLUCOSE BLD-MCNC: 128 MG/DL (ref 65–105)
GLUCOSE BLD-MCNC: 228 MG/DL (ref 65–105)
GLUCOSE SERPL-MCNC: 212 MG/DL (ref 70–99)
HBA1C MFR BLD: 7.3 % (ref 4–6)
HCT VFR BLD AUTO: 38 % (ref 36.3–47.1)
HGB BLD-MCNC: 12.6 G/DL (ref 11.9–15.1)
IMM GRANULOCYTES # BLD AUTO: <0.03 K/UL (ref 0–0.3)
IMM GRANULOCYTES NFR BLD: 0 %
INR PPP: 1
LYMPHOCYTES # BLD: 44 % (ref 24–43)
LYMPHOCYTES NFR BLD: 3.84 K/UL (ref 1.1–3.7)
MAGNESIUM SERPL-MCNC: 2.3 MG/DL (ref 1.6–2.6)
MCH RBC QN AUTO: 26.5 PG (ref 25.2–33.5)
MCHC RBC AUTO-ENTMCNC: 33.2 G/DL (ref 28.4–34.8)
MCV RBC AUTO: 79.8 FL (ref 82.6–102.9)
MONOCYTES NFR BLD: 0.76 K/UL (ref 0.1–1.2)
MONOCYTES NFR BLD: 9 % (ref 3–12)
NEUTROPHILS NFR BLD: 44 % (ref 36–65)
NEUTS SEG NFR BLD: 4 K/UL (ref 1.5–8.1)
NRBC BLD-RTO: 0 PER 100 WBC
PLATELET # BLD AUTO: 321 K/UL (ref 138–453)
PMV BLD AUTO: 9.7 FL (ref 8.1–13.5)
POTASSIUM SERPL-SCNC: 3.2 MMOL/L (ref 3.7–5.3)
PROT SERPL-MCNC: 7.3 G/DL (ref 6.4–8.3)
PROTHROMBIN TIME: 13.4 SEC (ref 11.7–14.9)
RBC # BLD AUTO: 4.76 M/UL (ref 3.95–5.11)
RBC # BLD: ABNORMAL 10*6/UL
SODIUM SERPL-SCNC: 136 MMOL/L (ref 135–144)
WBC OTHER # BLD: 8.8 K/UL (ref 3.5–11.3)

## 2023-06-29 PROCEDURE — 96372 THER/PROPH/DIAG INJ SC/IM: CPT

## 2023-06-29 PROCEDURE — 96376 TX/PRO/DX INJ SAME DRUG ADON: CPT

## 2023-06-29 PROCEDURE — 85610 PROTHROMBIN TIME: CPT

## 2023-06-29 PROCEDURE — 36415 COLL VENOUS BLD VENIPUNCTURE: CPT

## 2023-06-29 PROCEDURE — 6370000000 HC RX 637 (ALT 250 FOR IP): Performed by: INTERNAL MEDICINE

## 2023-06-29 PROCEDURE — 96375 TX/PRO/DX INJ NEW DRUG ADDON: CPT

## 2023-06-29 PROCEDURE — 99232 SBSQ HOSP IP/OBS MODERATE 35: CPT | Performed by: PSYCHIATRY & NEUROLOGY

## 2023-06-29 PROCEDURE — 2500000003 HC RX 250 WO HCPCS: Performed by: NURSE PRACTITIONER

## 2023-06-29 PROCEDURE — 6370000000 HC RX 637 (ALT 250 FOR IP): Performed by: NURSE PRACTITIONER

## 2023-06-29 PROCEDURE — 83036 HEMOGLOBIN GLYCOSYLATED A1C: CPT

## 2023-06-29 PROCEDURE — 2060000000 HC ICU INTERMEDIATE R&B

## 2023-06-29 PROCEDURE — 82947 ASSAY GLUCOSE BLOOD QUANT: CPT

## 2023-06-29 PROCEDURE — 2580000003 HC RX 258: Performed by: NURSE PRACTITIONER

## 2023-06-29 PROCEDURE — 6360000002 HC RX W HCPCS: Performed by: NURSE PRACTITIONER

## 2023-06-29 PROCEDURE — 83735 ASSAY OF MAGNESIUM: CPT

## 2023-06-29 PROCEDURE — 80048 BASIC METABOLIC PNL TOTAL CA: CPT

## 2023-06-29 PROCEDURE — 85027 COMPLETE CBC AUTOMATED: CPT

## 2023-06-29 PROCEDURE — 99232 SBSQ HOSP IP/OBS MODERATE 35: CPT | Performed by: INTERNAL MEDICINE

## 2023-06-29 PROCEDURE — APPSS60 APP SPLIT SHARED TIME 46-60 MINUTES: Performed by: NURSE PRACTITIONER

## 2023-06-29 RX ORDER — LEVOTHYROXINE SODIUM 0.12 MG/1
125 TABLET ORAL DAILY
Status: DISCONTINUED | OUTPATIENT
Start: 2023-06-29 | End: 2023-07-01 | Stop reason: HOSPADM

## 2023-06-29 RX ORDER — GABAPENTIN 100 MG/1
200 CAPSULE ORAL 3 TIMES DAILY
Status: DISCONTINUED | OUTPATIENT
Start: 2023-06-29 | End: 2023-07-01 | Stop reason: HOSPADM

## 2023-06-29 RX ORDER — CLONAZEPAM 1 MG/1
0.5 TABLET ORAL EVERY 8 HOURS
Status: DISCONTINUED | OUTPATIENT
Start: 2023-06-29 | End: 2023-07-01 | Stop reason: HOSPADM

## 2023-06-29 RX ORDER — INSULIN LISPRO 100 [IU]/ML
0-4 INJECTION, SOLUTION INTRAVENOUS; SUBCUTANEOUS
Status: DISCONTINUED | OUTPATIENT
Start: 2023-06-29 | End: 2023-07-01 | Stop reason: HOSPADM

## 2023-06-29 RX ORDER — LORAZEPAM 2 MG/ML
0.5 INJECTION INTRAMUSCULAR ONCE
Status: COMPLETED | OUTPATIENT
Start: 2023-06-29 | End: 2023-06-29

## 2023-06-29 RX ORDER — HYDROXYZINE 50 MG/1
50 TABLET, FILM COATED ORAL 3 TIMES DAILY PRN
Status: DISCONTINUED | OUTPATIENT
Start: 2023-06-29 | End: 2023-07-01 | Stop reason: HOSPADM

## 2023-06-29 RX ORDER — KETOROLAC TROMETHAMINE 30 MG/ML
30 INJECTION, SOLUTION INTRAMUSCULAR; INTRAVENOUS ONCE
Status: COMPLETED | OUTPATIENT
Start: 2023-06-29 | End: 2023-06-29

## 2023-06-29 RX ORDER — INSULIN GLARGINE 100 [IU]/ML
0.25 INJECTION, SOLUTION SUBCUTANEOUS DAILY
Status: DISCONTINUED | OUTPATIENT
Start: 2023-06-29 | End: 2023-07-01 | Stop reason: HOSPADM

## 2023-06-29 RX ORDER — INSULIN LISPRO 100 [IU]/ML
0-4 INJECTION, SOLUTION INTRAVENOUS; SUBCUTANEOUS NIGHTLY
Status: DISCONTINUED | OUTPATIENT
Start: 2023-06-29 | End: 2023-07-01 | Stop reason: HOSPADM

## 2023-06-29 RX ORDER — DEXTROSE MONOHYDRATE 100 MG/ML
INJECTION, SOLUTION INTRAVENOUS CONTINUOUS PRN
Status: DISCONTINUED | OUTPATIENT
Start: 2023-06-29 | End: 2023-07-01 | Stop reason: HOSPADM

## 2023-06-29 RX ADMIN — LEVOTHYROXINE SODIUM 125 MCG: 125 TABLET ORAL at 16:49

## 2023-06-29 RX ADMIN — CLONAZEPAM 0.5 MG: 1 TABLET ORAL at 14:34

## 2023-06-29 RX ADMIN — DESMOPRESSIN ACETATE 40 MG: 0.2 TABLET ORAL at 20:49

## 2023-06-29 RX ADMIN — SODIUM CHLORIDE: 9 INJECTION, SOLUTION INTRAVENOUS at 00:13

## 2023-06-29 RX ADMIN — FLUTICASONE PROPIONATE 2 SPRAY: 50 SPRAY, METERED NASAL at 09:23

## 2023-06-29 RX ADMIN — Medication 81 MG: at 09:22

## 2023-06-29 RX ADMIN — INSULIN LISPRO 1 UNITS: 100 INJECTION, SOLUTION INTRAVENOUS; SUBCUTANEOUS at 09:32

## 2023-06-29 RX ADMIN — DULOXETINE HYDROCHLORIDE 60 MG: 30 CAPSULE, DELAYED RELEASE ORAL at 09:22

## 2023-06-29 RX ADMIN — INSULIN GLARGINE 14 UNITS: 100 INJECTION, SOLUTION SUBCUTANEOUS at 09:33

## 2023-06-29 RX ADMIN — ENOXAPARIN SODIUM 40 MG: 40 INJECTION SUBCUTANEOUS at 09:22

## 2023-06-29 RX ADMIN — ATENOLOL 50 MG: 50 TABLET ORAL at 09:22

## 2023-06-29 RX ADMIN — CLONAZEPAM 0.5 MG: 1 TABLET ORAL at 21:17

## 2023-06-29 RX ADMIN — SODIUM CHLORIDE, PRESERVATIVE FREE 10 ML: 5 INJECTION INTRAVENOUS at 20:50

## 2023-06-29 RX ADMIN — TRAZODONE HYDROCHLORIDE 100 MG: 100 TABLET ORAL at 20:49

## 2023-06-29 RX ADMIN — ANTI-FUNGAL POWDER MICONAZOLE NITRATE TALC FREE: 1.42 POWDER TOPICAL at 00:15

## 2023-06-29 RX ADMIN — GABAPENTIN 200 MG: 100 CAPSULE ORAL at 14:36

## 2023-06-29 RX ADMIN — POTASSIUM CHLORIDE 40 MEQ: 1500 TABLET, EXTENDED RELEASE ORAL at 09:22

## 2023-06-29 RX ADMIN — GABAPENTIN 200 MG: 100 CAPSULE ORAL at 20:49

## 2023-06-29 RX ADMIN — KETOROLAC TROMETHAMINE 30 MG: 30 INJECTION, SOLUTION INTRAMUSCULAR; INTRAVENOUS at 06:42

## 2023-06-29 RX ADMIN — ANTI-FUNGAL POWDER MICONAZOLE NITRATE TALC FREE: 1.42 POWDER TOPICAL at 09:23

## 2023-06-29 RX ADMIN — ONDANSETRON 4 MG: 2 INJECTION INTRAMUSCULAR; INTRAVENOUS at 05:41

## 2023-06-29 RX ADMIN — LORAZEPAM 0.5 MG: 2 INJECTION INTRAMUSCULAR; INTRAVENOUS at 05:39

## 2023-06-29 RX ADMIN — FUROSEMIDE 20 MG: 20 TABLET ORAL at 09:22

## 2023-06-29 ASSESSMENT — ENCOUNTER SYMPTOMS
NAUSEA: 1
VOMITING: 1
COLOR CHANGE: 0
COUGH: 0
SHORTNESS OF BREATH: 0
ABDOMINAL PAIN: 0
VOMITING: 0
CHEST TIGHTNESS: 0
CHOKING: 0
DIARRHEA: 1
SHORTNESS OF BREATH: 1
DIARRHEA: 0
WHEEZING: 0
TROUBLE SWALLOWING: 0

## 2023-06-29 ASSESSMENT — PAIN DESCRIPTION - DESCRIPTORS: DESCRIPTORS: ACHING

## 2023-06-29 ASSESSMENT — PAIN SCALES - GENERAL
PAINLEVEL_OUTOF10: 0
PAINLEVEL_OUTOF10: 5
PAINLEVEL_OUTOF10: 7
PAINLEVEL_OUTOF10: 7
PAINLEVEL_OUTOF10: 0

## 2023-06-29 ASSESSMENT — PAIN - FUNCTIONAL ASSESSMENT: PAIN_FUNCTIONAL_ASSESSMENT: ACTIVITIES ARE NOT PREVENTED

## 2023-06-29 ASSESSMENT — PAIN DESCRIPTION - LOCATION
LOCATION: HEAD;NECK
LOCATION: GENERALIZED
LOCATION: NECK;BACK

## 2023-06-30 LAB
GLUCOSE BLD-MCNC: 159 MG/DL (ref 65–105)
GLUCOSE BLD-MCNC: 191 MG/DL (ref 65–105)
GLUCOSE BLD-MCNC: 211 MG/DL (ref 65–105)
GLUCOSE BLD-MCNC: 230 MG/DL (ref 65–105)
POTASSIUM SERPL-SCNC: 3.9 MMOL/L (ref 3.7–5.3)

## 2023-06-30 PROCEDURE — 6370000000 HC RX 637 (ALT 250 FOR IP): Performed by: NURSE PRACTITIONER

## 2023-06-30 PROCEDURE — 82947 ASSAY GLUCOSE BLOOD QUANT: CPT

## 2023-06-30 PROCEDURE — 6360000002 HC RX W HCPCS: Performed by: NURSE PRACTITIONER

## 2023-06-30 PROCEDURE — 6370000000 HC RX 637 (ALT 250 FOR IP): Performed by: INTERNAL MEDICINE

## 2023-06-30 PROCEDURE — 96376 TX/PRO/DX INJ SAME DRUG ADON: CPT

## 2023-06-30 PROCEDURE — 2060000000 HC ICU INTERMEDIATE R&B

## 2023-06-30 PROCEDURE — 97112 NEUROMUSCULAR REEDUCATION: CPT

## 2023-06-30 PROCEDURE — 97162 PT EVAL MOD COMPLEX 30 MIN: CPT

## 2023-06-30 PROCEDURE — 84132 ASSAY OF SERUM POTASSIUM: CPT

## 2023-06-30 PROCEDURE — 96372 THER/PROPH/DIAG INJ SC/IM: CPT

## 2023-06-30 PROCEDURE — 99232 SBSQ HOSP IP/OBS MODERATE 35: CPT | Performed by: INTERNAL MEDICINE

## 2023-06-30 PROCEDURE — 99232 SBSQ HOSP IP/OBS MODERATE 35: CPT | Performed by: NURSE PRACTITIONER

## 2023-06-30 PROCEDURE — 97530 THERAPEUTIC ACTIVITIES: CPT

## 2023-06-30 PROCEDURE — 97116 GAIT TRAINING THERAPY: CPT

## 2023-06-30 PROCEDURE — 2580000003 HC RX 258: Performed by: NURSE PRACTITIONER

## 2023-06-30 PROCEDURE — 36415 COLL VENOUS BLD VENIPUNCTURE: CPT

## 2023-06-30 RX ORDER — KETOROLAC TROMETHAMINE 15 MG/ML
15 INJECTION, SOLUTION INTRAMUSCULAR; INTRAVENOUS ONCE
Status: COMPLETED | OUTPATIENT
Start: 2023-06-30 | End: 2023-06-30

## 2023-06-30 RX ORDER — KETOROLAC TROMETHAMINE 30 MG/ML
30 INJECTION, SOLUTION INTRAMUSCULAR; INTRAVENOUS ONCE
Status: COMPLETED | OUTPATIENT
Start: 2023-06-30 | End: 2023-06-30

## 2023-06-30 RX ADMIN — ANTI-FUNGAL POWDER MICONAZOLE NITRATE TALC FREE: 1.42 POWDER TOPICAL at 09:17

## 2023-06-30 RX ADMIN — SODIUM CHLORIDE, PRESERVATIVE FREE 10 ML: 5 INJECTION INTRAVENOUS at 09:36

## 2023-06-30 RX ADMIN — FLUTICASONE PROPIONATE 2 SPRAY: 50 SPRAY, METERED NASAL at 09:17

## 2023-06-30 RX ADMIN — KETOROLAC TROMETHAMINE 30 MG: 30 INJECTION, SOLUTION INTRAMUSCULAR; INTRAVENOUS at 01:30

## 2023-06-30 RX ADMIN — FUROSEMIDE 20 MG: 20 TABLET ORAL at 09:17

## 2023-06-30 RX ADMIN — ANTI-FUNGAL POWDER MICONAZOLE NITRATE TALC FREE: 1.42 POWDER TOPICAL at 21:14

## 2023-06-30 RX ADMIN — DESMOPRESSIN ACETATE 40 MG: 0.2 TABLET ORAL at 21:09

## 2023-06-30 RX ADMIN — GABAPENTIN 200 MG: 100 CAPSULE ORAL at 13:33

## 2023-06-30 RX ADMIN — LEVOTHYROXINE SODIUM 125 MCG: 125 TABLET ORAL at 09:17

## 2023-06-30 RX ADMIN — GABAPENTIN 200 MG: 100 CAPSULE ORAL at 21:09

## 2023-06-30 RX ADMIN — CLONAZEPAM 0.5 MG: 1 TABLET ORAL at 05:00

## 2023-06-30 RX ADMIN — ONDANSETRON 4 MG: 4 TABLET, ORALLY DISINTEGRATING ORAL at 22:46

## 2023-06-30 RX ADMIN — HYDROXYZINE HYDROCHLORIDE 50 MG: 50 TABLET ORAL at 01:35

## 2023-06-30 RX ADMIN — TRAZODONE HYDROCHLORIDE 100 MG: 100 TABLET ORAL at 22:44

## 2023-06-30 RX ADMIN — Medication 81 MG: at 09:17

## 2023-06-30 RX ADMIN — GABAPENTIN 200 MG: 100 CAPSULE ORAL at 09:17

## 2023-06-30 RX ADMIN — INSULIN GLARGINE 14 UNITS: 100 INJECTION, SOLUTION SUBCUTANEOUS at 09:31

## 2023-06-30 RX ADMIN — ENOXAPARIN SODIUM 40 MG: 40 INJECTION SUBCUTANEOUS at 09:16

## 2023-06-30 RX ADMIN — CLONAZEPAM 0.5 MG: 1 TABLET ORAL at 21:09

## 2023-06-30 RX ADMIN — ATENOLOL 50 MG: 50 TABLET ORAL at 09:17

## 2023-06-30 RX ADMIN — KETOROLAC TROMETHAMINE 15 MG: 15 INJECTION, SOLUTION INTRAMUSCULAR; INTRAVENOUS at 21:12

## 2023-06-30 RX ADMIN — INSULIN LISPRO 1 UNITS: 100 INJECTION, SOLUTION INTRAVENOUS; SUBCUTANEOUS at 12:10

## 2023-06-30 RX ADMIN — CLONAZEPAM 0.5 MG: 1 TABLET ORAL at 13:33

## 2023-06-30 RX ADMIN — INSULIN LISPRO 1 UNITS: 100 INJECTION, SOLUTION INTRAVENOUS; SUBCUTANEOUS at 09:18

## 2023-06-30 ASSESSMENT — ENCOUNTER SYMPTOMS
VOMITING: 0
WHEEZING: 0
DIARRHEA: 0
SHORTNESS OF BREATH: 0
COUGH: 0
NAUSEA: 1
CHOKING: 0
CHEST TIGHTNESS: 0

## 2023-06-30 ASSESSMENT — PAIN SCALES - GENERAL: PAINLEVEL_OUTOF10: 7

## 2023-06-30 ASSESSMENT — PAIN DESCRIPTION - LOCATION: LOCATION: HEAD

## 2023-06-30 ASSESSMENT — PAIN DESCRIPTION - DESCRIPTORS: DESCRIPTORS: ACHING

## 2023-07-01 VITALS
OXYGEN SATURATION: 97 % | HEART RATE: 60 BPM | RESPIRATION RATE: 20 BRPM | DIASTOLIC BLOOD PRESSURE: 58 MMHG | HEIGHT: 64 IN | SYSTOLIC BLOOD PRESSURE: 120 MMHG | BODY MASS INDEX: 25.29 KG/M2 | TEMPERATURE: 98.1 F | WEIGHT: 148.15 LBS

## 2023-07-01 LAB
EKG ATRIAL RATE: 73 BPM
EKG P AXIS: 50 DEGREES
EKG P-R INTERVAL: 160 MS
EKG Q-T INTERVAL: 424 MS
EKG QRS DURATION: 58 MS
EKG QTC CALCULATION (BAZETT): 467 MS
EKG R AXIS: 45 DEGREES
EKG T AXIS: 116 DEGREES
EKG VENTRICULAR RATE: 73 BPM
FOLATE SERPL-MCNC: 18.7 NG/ML
GLUCOSE BLD-MCNC: 205 MG/DL (ref 65–105)
GLUCOSE BLD-MCNC: 213 MG/DL (ref 65–105)
VIT B12 SERPL-MCNC: 565 PG/ML (ref 232–1245)

## 2023-07-01 PROCEDURE — 6370000000 HC RX 637 (ALT 250 FOR IP): Performed by: NURSE PRACTITIONER

## 2023-07-01 PROCEDURE — 82947 ASSAY GLUCOSE BLOOD QUANT: CPT

## 2023-07-01 PROCEDURE — 6370000000 HC RX 637 (ALT 250 FOR IP): Performed by: INTERNAL MEDICINE

## 2023-07-01 PROCEDURE — 96372 THER/PROPH/DIAG INJ SC/IM: CPT

## 2023-07-01 PROCEDURE — 6360000002 HC RX W HCPCS: Performed by: NURSE PRACTITIONER

## 2023-07-01 PROCEDURE — 82746 ASSAY OF FOLIC ACID SERUM: CPT

## 2023-07-01 PROCEDURE — 99239 HOSP IP/OBS DSCHRG MGMT >30: CPT | Performed by: INTERNAL MEDICINE

## 2023-07-01 PROCEDURE — 82607 VITAMIN B-12: CPT

## 2023-07-01 PROCEDURE — 36415 COLL VENOUS BLD VENIPUNCTURE: CPT

## 2023-07-01 RX ORDER — LEVOTHYROXINE SODIUM 0.12 MG/1
125 TABLET ORAL DAILY
Qty: 30 TABLET | Refills: 3 | Status: SHIPPED | OUTPATIENT
Start: 2023-07-01

## 2023-07-01 RX ADMIN — ENOXAPARIN SODIUM 40 MG: 40 INJECTION SUBCUTANEOUS at 09:21

## 2023-07-01 RX ADMIN — FLUTICASONE PROPIONATE 2 SPRAY: 50 SPRAY, METERED NASAL at 09:24

## 2023-07-01 RX ADMIN — GABAPENTIN 200 MG: 100 CAPSULE ORAL at 09:22

## 2023-07-01 RX ADMIN — ONDANSETRON 4 MG: 4 TABLET, ORALLY DISINTEGRATING ORAL at 17:17

## 2023-07-01 RX ADMIN — INSULIN GLARGINE 14 UNITS: 100 INJECTION, SOLUTION SUBCUTANEOUS at 08:23

## 2023-07-01 RX ADMIN — INSULIN LISPRO 1 UNITS: 100 INJECTION, SOLUTION INTRAVENOUS; SUBCUTANEOUS at 08:23

## 2023-07-01 RX ADMIN — ATENOLOL 50 MG: 50 TABLET ORAL at 09:23

## 2023-07-01 RX ADMIN — Medication 81 MG: at 09:22

## 2023-07-01 RX ADMIN — CLONAZEPAM 0.5 MG: 1 TABLET ORAL at 09:21

## 2023-07-01 RX ADMIN — GABAPENTIN 200 MG: 100 CAPSULE ORAL at 15:36

## 2023-07-01 RX ADMIN — DULOXETINE HYDROCHLORIDE 60 MG: 30 CAPSULE, DELAYED RELEASE ORAL at 09:21

## 2023-07-01 RX ADMIN — LEVOTHYROXINE SODIUM 125 MCG: 125 TABLET ORAL at 08:16

## 2023-07-01 RX ADMIN — FUROSEMIDE 20 MG: 20 TABLET ORAL at 09:22

## 2023-07-01 RX ADMIN — ANTI-FUNGAL POWDER MICONAZOLE NITRATE TALC FREE: 1.42 POWDER TOPICAL at 09:23

## 2023-07-01 RX ADMIN — INSULIN LISPRO 1 UNITS: 100 INJECTION, SOLUTION INTRAVENOUS; SUBCUTANEOUS at 12:46

## 2024-04-27 ENCOUNTER — HOSPITAL ENCOUNTER (EMERGENCY)
Age: 54
Discharge: HOME | End: 2024-04-27
Attending: EMERGENCY MEDICINE
Payer: COMMERCIAL

## 2024-04-27 VITALS
DIASTOLIC BLOOD PRESSURE: 86 MMHG | RESPIRATION RATE: 16 BRPM | TEMPERATURE: 98.8 F | OXYGEN SATURATION: 99 % | HEIGHT: 64 IN | SYSTOLIC BLOOD PRESSURE: 144 MMHG | HEART RATE: 74 BPM | WEIGHT: 150 LBS | BODY MASS INDEX: 25.61 KG/M2

## 2024-04-27 DIAGNOSIS — F31.9 BIPOLAR 1 DISORDER (HCC): Primary | ICD-10-CM

## 2024-04-27 PROCEDURE — 99282 EMERGENCY DEPT VISIT SF MDM: CPT

## 2024-04-27 ASSESSMENT — LIFESTYLE VARIABLES
HOW OFTEN DO YOU HAVE A DRINK CONTAINING ALCOHOL: NEVER
HOW MANY STANDARD DRINKS CONTAINING ALCOHOL DO YOU HAVE ON A TYPICAL DAY: PATIENT DOES NOT DRINK

## 2024-04-27 ASSESSMENT — PAIN - FUNCTIONAL ASSESSMENT: PAIN_FUNCTIONAL_ASSESSMENT: NONE - DENIES PAIN

## 2024-04-27 NOTE — ED NOTES
Patient is a 54 year old female that comes to the ED today for a mental health evaluation. Patient states she came in on her own today because she \"is not mentally right.\"     Patient reports she has had cockroaches in her house for over a year and is very upset about the situation. Patient reports she is letting her two adult daughters, her ex  and his girlfriend all stay in her house and they are not willing to help her. Patient reports in addition to the cockroaches there is large amounts of dog feces in the home. Patient reports she \"can't clean it herself\" and the other adults in the house wont help.     Patient reports a history of bipolar disorder and being linked with [facility] for outpatient care. Patient reports she is compliant with outpatient appointments and prescribed psychiatric medications. Patient reports having suicidal ideations since she was 20 but does not note any change, plan or intent of suicide today. Patient also states she has had thoughts of \"hurting people\" since appx 2001. Patient denies any intent to harm someone specific but states she \"snaps at people\" and \"feels angry.\"     Writer and ED physician discuss how environmental and social issues can impact ones mental health. Writer and ED physician discuss possible ways to manage these concerns. Writer and ED physician agree patient does not meet criteria for inpatient psychiatric admission at this time.     Writer provides patient with resources for [facility] and [facility].

## 2024-04-27 NOTE — ED PROVIDER NOTES
EMERGENCY DEPARTMENT ENCOUNTER    Pt Name: Parth Gardner  MRN: [id number]  Birthdate 1970  Date of evaluation: 4/27/24  CHIEF COMPLAINT       Chief Complaint   Patient presents with    Mental Health Problem     HISTORY OF PRESENT ILLNESS   54-year-old female with past medical history of bipolar 1 disorder is presenting for mental health evaluation.  She says that she has been having thoughts of wanting to harm others for the last 22 years.  She is also had suicidal thoughts since she was 20 years old.  She has no current plan.  She says that she is stressed about her home living situation.  She says that there are cockroaches in the house and it is making it difficult for her to live there.  Her ex-boyfriend is also staying with her and there are too many people in the house and it is making her feel uncomfortable.    The history is provided by the patient.           REVIEW OF SYSTEMS     Review of Systems   Unable to perform ROS: Psychiatric disorder   Psychiatric/Behavioral:  Positive for dysphoric mood and suicidal ideas.      PASTMEDICAL HISTORY     Past Medical History:   Diagnosis Date    Arthritis     Back pain 10/29/2015    Bipolar 1 disorder (HCC)     COPD (chronic obstructive pulmonary disease) (HCC)     Depression     Diabetes mellitus (HCC)     Diabetic polyneuropathy associated with type 2 diabetes mellitus (HCC) 6/29/2023    Fibromyalgia     GERD (gastroesophageal reflux disease)     Glaucoma     Hx of blood clots     DVT  (20yrs ago)    Hyperlipidemia     Hypertension     Hyperthyroidism 6/29/2023    Lumbosacral spondylosis without myelopathy     Morbid obesity (HCC) 10/12/2015    On home oxygen therapy     2 liters into the cpap machine    Open abdominal wall wound 8/5/2022    Pitting edema     PONV (postoperative nausea and vomiting)     Renal stones     history of renal stones    Sleep apnea     cpap    Thyroid disease     Urge incontinence 1/9/2019    Warts, genital      Past Problem

## (undated) DEVICE — SUTURE V-LOC 180 SZ 0 L12IN ABSRB GRN L37MM GS-21 1/2 CIR VLOCL0316

## (undated) DEVICE — FORCEPS BX L240CM WRK CHN 2.8MM STD CAP W/ NDL MIC MESH

## (undated) DEVICE — KIT DRP 3 ARM ACC DISP ENDOWRIST DA VINCI SI

## (undated) DEVICE — STERRAD NX CASSETTE: Brand: STERRAD

## (undated) DEVICE — ST. ANNE'S MULTI PORT PACK: Brand: MEDLINE INDUSTRIES, INC.

## (undated) DEVICE — JELLY,LUBE,STERILE,FLIP TOP,TUBE,2-OZ: Brand: MEDLINE

## (undated) DEVICE — BITEBLOCK 54FR W/ DENT RIM BLOX

## (undated) DEVICE — OBTURATOR ROBOTIC DIA8MM BLDELSS ENDOSCP DISP DA VINCI SI

## (undated) DEVICE — GARMENT COMPR STD FOR 17IN CALF UNIF THER FLOTRN

## (undated) DEVICE — GLOVE ORANGE PI 7 1/2   MSG9075

## (undated) DEVICE — MEDI-VAC YANKAUER SUCTION HANDLE W/BULBOUS TIP: Brand: CARDINAL HEALTH

## (undated) DEVICE — 40580 - THE PINK PAD - ADVANCED TRENDELENBURG POSITIONING KIT: Brand: 40580 - THE PINK PAD - ADVANCED TRENDELENBURG POSITIONING KIT

## (undated) DEVICE — MEDI-VAC SUCTION HANDLE REGULAR CAPACITY: Brand: CARDINAL HEALTH

## (undated) DEVICE — Z CONVERTED USE 2271043 CONTAINER SPEC COLL 4OZ SCR ON LID PEEL PCH

## (undated) DEVICE — SUTURE SZ 0 27IN 5/8 CIR UR-6  TAPER PT VIOLET ABSRB VICRYL J603H

## (undated) DEVICE — DEFENDO AIR WATER SUCTION AND BIOPSY VALVE KIT FOR  OLYMPUS: Brand: DEFENDO AIR/WATER/SUCTION AND BIOPSY VALVE

## (undated) DEVICE — SUTURE DEV SZ 0 L6IN N ABSORBABLE

## (undated) DEVICE — MEDI-VAC NON-CONDUCTIVE SUCTION TUBING: Brand: CARDINAL HEALTH

## (undated) DEVICE — SYRINGE MED 50ML LUERSLIP TIP

## (undated) DEVICE — GOWN,POLY REINFORCED,LG: Brand: MEDLINE

## (undated) DEVICE — DISCONTINUED DBM POSITIONER HEAD SLOTTED ADLT FOAM PINK

## (undated) DEVICE — BLADE CLIPPER GEN PURP NS

## (undated) DEVICE — SUTURE VCRL + SZ 3-0 L27IN ABSRB UD L26MM SH 1/2 CIR VCP416H

## (undated) DEVICE — SINGLE USE MONOPOLAR STRAIGHT ENDOSCOPIC CORD 10 FT. (3M): Brand: KIRWAN

## (undated) DEVICE — TRAY URIN CATH 16FR DRNGE BG STATLOK STBL DEV F SURSTP

## (undated) DEVICE — TIP COVER ACCESSORY

## (undated) DEVICE — DUAL LUMEN STOMACH TUBE: Brand: SALEM SUMP

## (undated) DEVICE — HYPODERMIC SAFETY NEEDLE: Brand: MAGELLAN

## (undated) DEVICE — GLOVE SURG SZ 75 L12IN FNGR THK87MIL WHT LTX FREE

## (undated) DEVICE — GLOVE SURG SZ 8 L12IN FNGR THK87MIL WHT LTX FREE

## (undated) DEVICE — CORD,CAUTERY,BIPOLAR,STERILE: Brand: MEDLINE

## (undated) DEVICE — ADHESIVE SKIN CLSR 0.7ML TOP DERMBND ADV

## (undated) DEVICE — TROCARS: Brand: KII® BALLOON BLUNT TIP SYSTEM

## (undated) DEVICE — SUTURE V-LOC 180 SZ 0 L18IN ABSRB GRN GS-21 L37MM 1/2 CIR VLOCL0326

## (undated) DEVICE — ENDO KIT W/SYRINGE: Brand: MEDLINE INDUSTRIES, INC.